# Patient Record
Sex: FEMALE | Race: OTHER | Employment: FULL TIME | URBAN - METROPOLITAN AREA
[De-identification: names, ages, dates, MRNs, and addresses within clinical notes are randomized per-mention and may not be internally consistent; named-entity substitution may affect disease eponyms.]

---

## 2017-06-02 ENCOUNTER — HOSPITAL ENCOUNTER (EMERGENCY)
Age: 32
Discharge: HOME | End: 2017-06-02
Attending: EMERGENCY MEDICINE
Payer: COMMERCIAL

## 2017-06-02 VITALS — BODY MASS INDEX: 38.73 KG/M2 | WEIGHT: 241 LBS | HEIGHT: 66 IN

## 2017-06-02 DIAGNOSIS — J40 BRONCHITIS: Primary | ICD-10-CM

## 2017-06-02 PROCEDURE — 99283 EMERGENCY DEPT VISIT LOW MDM: CPT

## 2017-06-02 PROCEDURE — 94640 AIRWAY INHALATION TREATMENT: CPT

## 2017-06-02 PROCEDURE — 99284 EMERGENCY DEPT VISIT MOD MDM: CPT

## 2017-06-02 RX ORDER — AZITHROMYCIN 500 MG/1
500 TABLET, FILM COATED ORAL DAILY
COMMUNITY
End: 2019-08-29

## 2017-06-02 RX ORDER — ALBUTEROL SULFATE 90 UG/1
2 AEROSOL, METERED RESPIRATORY (INHALATION) EVERY 4 HOURS PRN
Qty: 1 INHALER | Refills: 0 | Status: SHIPPED | OUTPATIENT
Start: 2017-06-02 | End: 2017-07-02

## 2017-06-02 RX ORDER — IPRATROPIUM BROMIDE AND ALBUTEROL SULFATE 2.5; .5 MG/3ML; MG/3ML
3 SOLUTION RESPIRATORY (INHALATION) ONCE
Status: COMPLETED | OUTPATIENT
Start: 2017-06-02 | End: 2017-06-02

## 2017-06-03 NOTE — ED INITIAL ASSESSMENT (HPI)
ken and cough x 2 wks, came from quick care given john chin with bronchitis. Pt states she is feeling worse. Denies fevers.

## 2017-06-03 NOTE — ED PROVIDER NOTES
Patient Seen in: [facility] In [place]    History   Patient presents with:  Cough/URI  Dyspnea MOHAN SOB (respiratory)    Stated Complaint: cough, dx with bronchitis and ear infection at urgent care 2 hours ago.  findiin*    HPI    This light. Conjuctiva clear. Oropharynx is clear and moist.   Lungs: Coarse bilaterally. HEART:  Regular rate and rhythm. S1 and S2. No murmurs, no rubs or gallops. ABDOMEN: Soft, nontender and nondistended. Normoactive bowel sounds. No rebound.  No guardin

## 2019-08-29 PROBLEM — F41.1 GENERALIZED ANXIETY DISORDER: Status: ACTIVE | Noted: 2019-08-29

## 2020-01-29 ENCOUNTER — HOSPITAL ENCOUNTER (EMERGENCY)
Age: 35
Discharge: HOME | End: 2020-01-29
Attending: EMERGENCY MEDICINE
Payer: COMMERCIAL

## 2020-01-29 ENCOUNTER — APPOINTMENT (OUTPATIENT)
Dept: GENERAL RADIOLOGY | Age: 35
End: 2020-01-29
Attending: EMERGENCY MEDICINE
Payer: COMMERCIAL

## 2020-01-29 VITALS
BODY MASS INDEX: 37.93 KG/M2 | HEIGHT: 66 IN | TEMPERATURE: 98 F | RESPIRATION RATE: 18 BRPM | SYSTOLIC BLOOD PRESSURE: 125 MMHG | OXYGEN SATURATION: 99 % | DIASTOLIC BLOOD PRESSURE: 85 MMHG | WEIGHT: 236 LBS | HEART RATE: 92 BPM

## 2020-01-29 DIAGNOSIS — J20.9 ACUTE BRONCHITIS, UNSPECIFIED ORGANISM: Primary | ICD-10-CM

## 2020-01-29 LAB
ADENOVIRUS PCR:: NEGATIVE
B PERT DNA SPEC QL NAA+PROBE: NEGATIVE
C PNEUM DNA SPEC QL NAA+PROBE: NEGATIVE
CORONAVIRUS 229E PCR:: NEGATIVE
CORONAVIRUS HKU1 PCR:: NEGATIVE
CORONAVIRUS NL63 PCR:: NEGATIVE
CORONAVIRUS OC43 PCR:: NEGATIVE
FLUAV RNA SPEC QL NAA+PROBE: NEGATIVE
FLUBV RNA SPEC QL NAA+PROBE: NEGATIVE
METAPNEUMOVIRUS PCR:: NEGATIVE
MYCOPLASMA PNEUMONIA PCR:: NEGATIVE
PARAINFLUENZA 1 PCR:: NEGATIVE
PARAINFLUENZA 2 PCR:: NEGATIVE
PARAINFLUENZA 3 PCR:: NEGATIVE
PARAINFLUENZA 4 PCR:: NEGATIVE
RHINOVIRUS/ENTERO PCR:: NEGATIVE
RSV RNA SPEC QL NAA+PROBE: NEGATIVE

## 2020-01-29 PROCEDURE — 94664 DEMO&/EVAL PT USE INHALER: CPT

## 2020-01-29 PROCEDURE — 87798 DETECT AGENT NOS DNA AMP: CPT | Performed by: EMERGENCY MEDICINE

## 2020-01-29 PROCEDURE — 87999 UNLISTED MICROBIOLOGY PX: CPT

## 2020-01-29 PROCEDURE — 87581 M.PNEUMON DNA AMP PROBE: CPT | Performed by: EMERGENCY MEDICINE

## 2020-01-29 PROCEDURE — 87633 RESP VIRUS 12-25 TARGETS: CPT | Performed by: EMERGENCY MEDICINE

## 2020-01-29 PROCEDURE — 71046 X-RAY EXAM CHEST 2 VIEWS: CPT | Performed by: EMERGENCY MEDICINE

## 2020-01-29 PROCEDURE — 99284 EMERGENCY DEPT VISIT MOD MDM: CPT

## 2020-01-29 PROCEDURE — 99283 EMERGENCY DEPT VISIT LOW MDM: CPT

## 2020-01-29 PROCEDURE — 87486 CHLMYD PNEUM DNA AMP PROBE: CPT | Performed by: EMERGENCY MEDICINE

## 2020-01-29 RX ORDER — ALBUTEROL SULFATE 90 UG/1
2 AEROSOL, METERED RESPIRATORY (INHALATION) EVERY 6 HOURS PRN
Qty: 1 INHALER | Refills: 0 | Status: SHIPPED | OUTPATIENT
Start: 2020-01-29 | End: 2020-02-08 | Stop reason: ALTCHOICE

## 2020-01-29 NOTE — ED PROVIDER NOTES
Patient Seen in: [facility] Department In [place]      History   Patient presents with:  Dyspnea MOHAN SOB    Stated Complaint: cough    HPI    Patient is a 41-year-old female comes emergency room for evaluation of cough.   Patient states she is be lymphadenopathy. LUNG: Lungs clear to auscultation bilaterally, no wheezing, no rales, no rhonchi. CARDIOVASCULAR: Regular rate and rhythm. Normal S1S2. No S3S4 or murmur.   SKIN:  warm and dry  NEURO:  no focal deficits    ED Course     Labs Reviewed return immediately to the ER for worsening, concerning, new, changing or persisting symptoms. I discussed the case with the patient and they had no questions, complaints, or concerns. Patient felt comfortable going home.             Disposition and Plan

## 2020-01-29 NOTE — ED INITIAL ASSESSMENT (HPI)
TO ER WITH 1 MONTH OF COUGH. STATES WENT TO URGENT CARE AND WAS TOLD SHE HAD \"THE COMMON COLD\" AND PLACED ON A Z-PAC. PT STATES WORSENING OF COUGH OVER PAST 2 WEEKS.   ALSO WITH C/O BILAT EAR PAIN

## 2020-01-31 ENCOUNTER — OFFICE VISIT (OUTPATIENT)
Dept: FAMILY MEDICINE CLINIC | Facility: CLINIC | Age: 35
End: 2020-01-31
Payer: COMMERCIAL

## 2020-01-31 VITALS
DIASTOLIC BLOOD PRESSURE: 80 MMHG | HEART RATE: 74 BPM | TEMPERATURE: 98 F | RESPIRATION RATE: 16 BRPM | OXYGEN SATURATION: 99 % | WEIGHT: 245 LBS | HEIGHT: 66 IN | SYSTOLIC BLOOD PRESSURE: 128 MMHG | BODY MASS INDEX: 39.37 KG/M2

## 2020-01-31 DIAGNOSIS — Z00.00 LABORATORY TESTS ORDERED AS PART OF A COMPLETE PHYSICAL EXAM (CPE): ICD-10-CM

## 2020-01-31 DIAGNOSIS — J40 BRONCHITIS: Primary | ICD-10-CM

## 2020-01-31 DIAGNOSIS — R05.3 CHRONIC COUGH: ICD-10-CM

## 2020-01-31 PROCEDURE — 99203 OFFICE O/P NEW LOW 30 MIN: CPT | Performed by: FAMILY MEDICINE

## 2020-01-31 RX ORDER — FLUTICASONE PROPIONATE 50 MCG
2 SPRAY, SUSPENSION (ML) NASAL DAILY
Qty: 3 BOTTLE | Refills: 3 | Status: SHIPPED | OUTPATIENT
Start: 2020-01-31 | End: 2020-02-08 | Stop reason: ALTCHOICE

## 2020-01-31 RX ORDER — METHYLPREDNISOLONE 4 MG/1
TABLET ORAL
Qty: 1 KIT | Refills: 0 | Status: SHIPPED | OUTPATIENT
Start: 2020-01-31 | End: 2020-02-08 | Stop reason: ALTCHOICE

## 2020-01-31 RX ORDER — CODEINE PHOSPHATE AND GUAIFENESIN 10; 100 MG/5ML; MG/5ML
5 SOLUTION ORAL EVERY 6 HOURS PRN
Qty: 180 ML | Refills: 0 | Status: SHIPPED | OUTPATIENT
Start: 2020-01-31 | End: 2020-02-08 | Stop reason: ALTCHOICE

## 2020-01-31 NOTE — PROGRESS NOTES
HPI:    Patient ID: Beatrice Ortiz is a 37year old female. Patient presents with:  Cough: New patient cough and chest congestion- she was told in ER she has bronchitis. She is due for a routine physical with fasting blood work and papsmear.   Tdap is up to hours as needed for cough or congestion. 180 mL 0   • methylPREDNISolone (MEDROL) 4 MG Oral Tablet Therapy Pack As directed. 1 kit 0   • Fluticasone Propionate 50 MCG/ACT Nasal Suspension 2 sprays by Each Nare route daily.  3 Bottle 3   • Albuterol Sulfate complete physical exam (CPE)  F/u for physical   - CBC WITH DIFFERENTIAL WITH PLATELET  - COMP METABOLIC PANEL (14)  - LIPID PANEL  - TSH W REFLEX TO FREE T4  - HEMOGLOBIN A1C  - TSH W REFLEX TO FREE T4  - HEMOGLOBIN A1C    2.  Chronic cough  - guaiFENesin-

## 2020-02-01 DIAGNOSIS — D50.9 MICROCYTIC ANEMIA: Primary | ICD-10-CM

## 2020-02-01 LAB
ABSOLUTE BASOPHILS: 32 CELLS/UL (ref 0–200)
ABSOLUTE EOSINOPHILS: 224 CELLS/UL (ref 15–500)
ABSOLUTE LYMPHOCYTES: 2360 CELLS/UL (ref 850–3900)
ABSOLUTE MONOCYTES: 880 CELLS/UL (ref 200–950)
ABSOLUTE NEUTROPHILS: 4504 CELLS/UL (ref 1500–7800)
ALBUMIN/GLOBULIN RATIO: 1.3 (CALC) (ref 1–2.5)
ALBUMIN: 3.9 G/DL (ref 3.6–5.1)
ALKALINE PHOSPHATASE: 84 U/L (ref 33–115)
ALT: 22 U/L (ref 6–29)
AST: 17 U/L (ref 10–30)
BASOPHILS: 0.4 %
BILIRUBIN, TOTAL: 0.3 MG/DL (ref 0.2–1.2)
BUN: 12 MG/DL (ref 7–25)
CALCIUM: 8.6 MG/DL (ref 8.6–10.2)
CARBON DIOXIDE: 23 MMOL/L (ref 20–32)
CHLORIDE: 104 MMOL/L (ref 98–110)
CHOL/HDLC RATIO: 2.5 (CALC)
CHOLESTEROL, TOTAL: 181 MG/DL
CREATININE: 0.75 MG/DL (ref 0.5–1.1)
EGFR IF AFRICN AM: 119 ML/MIN/1.73M2
EGFR IF NONAFRICN AM: 103 ML/MIN/1.73M2
EOSINOPHILS: 2.8 %
GLOBULIN: 2.9 G/DL (CALC) (ref 1.9–3.7)
GLUCOSE: 89 MG/DL (ref 65–99)
HDL CHOLESTEROL: 72 MG/DL
HEMATOCRIT: 33.2 % (ref 35–45)
HEMOGLOBIN A1C: 5.6 % OF TOTAL HGB
HEMOGLOBIN: 10.8 G/DL (ref 11.7–15.5)
LDL-CHOLESTEROL: 88 MG/DL (CALC)
LYMPHOCYTES: 29.5 %
MCH: 25.2 PG (ref 27–33)
MCHC: 32.5 G/DL (ref 32–36)
MCV: 77.6 FL (ref 80–100)
MONOCYTES: 11 %
MPV: 9.7 FL (ref 7.5–12.5)
NEUTROPHILS: 56.3 %
NON-HDL CHOLESTEROL: 109 MG/DL (CALC)
PLATELET COUNT: 388 THOUSAND/UL (ref 140–400)
POTASSIUM: 3.9 MMOL/L (ref 3.5–5.3)
PROTEIN, TOTAL: 6.8 G/DL (ref 6.1–8.1)
RDW: 15.4 % (ref 11–15)
RED BLOOD CELL COUNT: 4.28 MILLION/UL (ref 3.8–5.1)
SODIUM: 135 MMOL/L (ref 135–146)
TRIGLYCERIDES: 117 MG/DL
TSH W/REFLEX TO FT4: 2.02 MIU/L
WHITE BLOOD CELL COUNT: 8 THOUSAND/UL (ref 3.8–10.8)

## 2020-02-08 ENCOUNTER — OFFICE VISIT (OUTPATIENT)
Dept: FAMILY MEDICINE CLINIC | Facility: CLINIC | Age: 35
End: 2020-02-08
Payer: COMMERCIAL

## 2020-02-08 VITALS
HEIGHT: 66 IN | DIASTOLIC BLOOD PRESSURE: 80 MMHG | HEART RATE: 76 BPM | BODY MASS INDEX: 39.37 KG/M2 | OXYGEN SATURATION: 99 % | RESPIRATION RATE: 16 BRPM | SYSTOLIC BLOOD PRESSURE: 126 MMHG | TEMPERATURE: 98 F | WEIGHT: 245 LBS

## 2020-02-08 DIAGNOSIS — D21.9 FIBROIDS: ICD-10-CM

## 2020-02-08 DIAGNOSIS — Z01.419 WELL WOMAN EXAM WITH ROUTINE GYNECOLOGICAL EXAM: Primary | ICD-10-CM

## 2020-02-08 DIAGNOSIS — D50.0 IRON DEFICIENCY ANEMIA DUE TO CHRONIC BLOOD LOSS: ICD-10-CM

## 2020-02-08 DIAGNOSIS — Z71.82 EXERCISE COUNSELING: ICD-10-CM

## 2020-02-08 DIAGNOSIS — N97.9 INFERTILITY, FEMALE: ICD-10-CM

## 2020-02-08 PROBLEM — F41.1 GENERALIZED ANXIETY DISORDER: Status: RESOLVED | Noted: 2019-08-29 | Resolved: 2020-02-08

## 2020-02-08 PROCEDURE — 99213 OFFICE O/P EST LOW 20 MIN: CPT | Performed by: FAMILY MEDICINE

## 2020-02-08 PROCEDURE — 99395 PREV VISIT EST AGE 18-39: CPT | Performed by: FAMILY MEDICINE

## 2020-02-08 RX ORDER — MELATONIN
325
Qty: 90 TABLET | Refills: 0 | Status: SHIPPED | OUTPATIENT
Start: 2020-02-08 | End: 2020-05-04

## 2020-02-08 RX ORDER — OMEPRAZOLE 40 MG/1
1 CAPSULE, DELAYED RELEASE ORAL DAILY
COMMUNITY
End: 2020-07-24

## 2020-02-08 NOTE — PROGRESS NOTES
HPI:   Nahum Pierre is a 37year old female that presents for well woman exam.     Patient presents with:  Physical: Routine annual physical-she had fasting blood work done on 1/31/2020 . Tdap is up to date. she has a hx of fibroids.    She sees Gyn for i The right ovary measures 2.5 x 2.2 x 1.7 cm and the left ovary 2.5 x 2.0 x 2.1 cm. There is no   cystic or solid adnexal mass.         There is no fluid in the cul-de-sac.         Impression: There are 2 round areas of abnormal echotexture in the uterus. determined that FDA review is not required for   such in-house assays.  (21 .30(e))    CLINICAL INFORMATION         Other Clinical Conditions:     Pelvic Exam, Normal   Intradepartmental Consultation Pathologist:       ICD-CM(s): A; Z12.4 Z11.51   C nervous/anxious. No depression.       PHYSICAL EXAM:   /80   Pulse 76   Temp 98.2 °F (36.8 °C) (Temporal)   Resp 16   Ht 66\"   Wt 245 lb (111.1 kg)   LMP 01/18/2020   SpO2 99%   BMI 39.54 kg/m²  Estimated body mass index is 39.54 kg/m² as calculated 2/2 chronic blood loss from fibroids and heavy menses. Started on iron supplement. F/u with gyn for management of fibroids. - ferrous sulfate 325 (65 FE) MG Oral Tab EC; Take 1 tablet (325 mg total) by mouth daily with breakfast.  Dispense: 90 tablet;  Re

## 2020-02-11 PROBLEM — N97.9 INFERTILITY, FEMALE: Status: ACTIVE | Noted: 2020-02-11

## 2020-02-11 PROBLEM — D50.0 IRON DEFICIENCY ANEMIA DUE TO CHRONIC BLOOD LOSS: Status: ACTIVE | Noted: 2020-02-11

## 2020-02-25 ENCOUNTER — OFFICE VISIT (OUTPATIENT)
Dept: FAMILY MEDICINE CLINIC | Facility: CLINIC | Age: 35
End: 2020-02-25
Payer: COMMERCIAL

## 2020-02-25 VITALS
BODY MASS INDEX: 39.37 KG/M2 | RESPIRATION RATE: 18 BRPM | DIASTOLIC BLOOD PRESSURE: 80 MMHG | WEIGHT: 245 LBS | SYSTOLIC BLOOD PRESSURE: 126 MMHG | HEIGHT: 66 IN | TEMPERATURE: 98 F | HEART RATE: 80 BPM

## 2020-02-25 DIAGNOSIS — T78.40XA ALLERGIC REACTION, INITIAL ENCOUNTER: ICD-10-CM

## 2020-02-25 DIAGNOSIS — R22.0 FACIAL SWELLING: Primary | ICD-10-CM

## 2020-02-25 PROCEDURE — 99213 OFFICE O/P EST LOW 20 MIN: CPT | Performed by: FAMILY MEDICINE

## 2020-02-25 RX ORDER — METHYLPREDNISOLONE 4 MG/1
TABLET ORAL
Qty: 1 KIT | Refills: 0 | Status: SHIPPED | OUTPATIENT
Start: 2020-02-25 | End: 2020-07-24 | Stop reason: ALTCHOICE

## 2020-02-25 NOTE — PROGRESS NOTES
Patient presents with:  Swelling: Facial swelling and ear swelling- she thought possible food allergy since shes allergic to black pepper- she was taken benadryl which has helped.       HPI:    Patient presents with:  Swelling: Facial swelling and ear swell Smokeless tobacco: Never Used    Alcohol use: Yes      Frequency: 2-4 times a month      Drinks per session: 1 or 2      Comment: socially    Drug use: Never      Current Outpatient Medications   Medication Sig Dispense Refill   • methylPREDNISolone (Zhang Ng initial encounter  - methylPREDNISolone (MEDROL) 4 MG Oral Tablet Therapy Pack; As directed. Dispense: 1 kit; Refill: 0      Possible allergic reaction to the hair dye. Started her on steroid pack. Continue on benadryl prn for itch and swelling.    Cli

## 2020-05-04 DIAGNOSIS — D21.9 FIBROIDS: ICD-10-CM

## 2020-05-04 DIAGNOSIS — D50.0 IRON DEFICIENCY ANEMIA DUE TO CHRONIC BLOOD LOSS: ICD-10-CM

## 2020-05-04 RX ORDER — PNV NO.95/FERROUS FUM/FOLIC AC 28MG-0.8MG
TABLET ORAL
Qty: 90 TABLET | Refills: 0 | Status: SHIPPED | OUTPATIENT
Start: 2020-05-04 | End: 2020-10-31 | Stop reason: ALTCHOICE

## 2020-05-04 NOTE — TELEPHONE ENCOUNTER
Requesting IRON 325 (65 Fe) MG  LOV: 2/08/20  RTC: No follow-ups on file  Last Relevant Labs: 2/1/20- lab orders pending  Filled: 2/8/20 #90 with 0 refills    No future appointments.

## 2020-05-05 ENCOUNTER — TELEPHONE (OUTPATIENT)
Dept: FAMILY MEDICINE CLINIC | Facility: CLINIC | Age: 35
End: 2020-05-05

## 2020-05-05 ENCOUNTER — VIRTUAL PHONE E/M (OUTPATIENT)
Dept: FAMILY MEDICINE CLINIC | Facility: CLINIC | Age: 35
End: 2020-05-05
Payer: COMMERCIAL

## 2020-05-05 DIAGNOSIS — R51.9 SINUS HEADACHE: Primary | ICD-10-CM

## 2020-05-05 DIAGNOSIS — Z91.09 ENVIRONMENTAL ALLERGIES: ICD-10-CM

## 2020-05-05 DIAGNOSIS — H65.193 ACUTE MIDDLE EAR EFFUSION, BILATERAL: ICD-10-CM

## 2020-05-05 PROCEDURE — 99213 OFFICE O/P EST LOW 20 MIN: CPT | Performed by: FAMILY MEDICINE

## 2020-05-05 RX ORDER — AZELASTINE 1 MG/ML
1 SPRAY, METERED NASAL 2 TIMES DAILY
Qty: 30 ML | Refills: 0 | Status: SHIPPED | OUTPATIENT
Start: 2020-05-05 | End: 2020-06-04

## 2020-05-05 RX ORDER — FEXOFENADINE HCL AND PSEUDOEPHEDRINE HCI 180; 240 MG/1; MG/1
1 TABLET, EXTENDED RELEASE ORAL DAILY
Qty: 90 TABLET | Refills: 0 | Status: SHIPPED | OUTPATIENT
Start: 2020-05-05 | End: 2020-10-22

## 2020-05-05 RX ORDER — FLUTICASONE PROPIONATE 50 MCG
2 SPRAY, SUSPENSION (ML) NASAL DAILY
Qty: 3 BOTTLE | Refills: 3 | Status: SHIPPED | OUTPATIENT
Start: 2020-05-05 | End: 2020-12-12

## 2020-05-05 NOTE — TELEPHONE ENCOUNTER
Future Appointments   Date Time Provider Anny [place]   5/5/2020 10:30 AM Nicky Blood, DO [facility] [address] [facility] [address]     Patient verbally consents to a virtual/telephone check-in service for the date and time noted above.  Patient understands and accepts

## 2020-05-05 NOTE — PROGRESS NOTES
Virtual Telephone Check-In    Yonny Houston verbally consents to a Virtual/Telephone Check-In visit on 05/05/20. Patient understands and accepts financial responsibility for any deductible, co-insurance and/or co-pays associated with this service.     Dur Environmental allergies  - Fluticasone Propionate 50 MCG/ACT Nasal Suspension; 2 sprays by Each Nare route daily. Dispense: 3 Bottle; Refill: 3  - Azelastine HCl 0.1 % Nasal Solution; 1 spray by Nasal route 2 (two) times daily.   Dispense: 30 mL; Refill: 0

## 2020-05-05 NOTE — TELEPHONE ENCOUNTER
Called in RX for Fexofenadine-Pseudoephed ER to [facility] per Dr. Allison Duckworth approval. Task completed.

## 2020-05-05 NOTE — PATIENT INSTRUCTIONS
Sinus Headache    The sinuses are air-filled spaces in the bones of the face. They connect to the inside of the nose. Sinusitis is an inflammation of the tissue lining the sinus cavities.  Sinus inflammation can occur during a cold or hay fever (allergies · You may use over-the-counter decongestants unless a similar medicine was prescribed. Nasal sprays or drops work the fastest. Use one that contains phenylephrine or oxymetazoline. First blow your nose gently to remove mucus. Then apply the spray or drops. · You may use over-the-counter medicine to control pain and fever, unless another pain medicine was prescribed. Talk with your provider before using acetaminophen or ibuprofen if you have chronic liver or kidney disease.  Also talk with your provider if you

## 2020-05-20 ENCOUNTER — TELEPHONE (OUTPATIENT)
Dept: FAMILY MEDICINE CLINIC | Facility: CLINIC | Age: 35
End: 2020-05-20

## 2020-05-20 ENCOUNTER — VIRTUAL PHONE E/M (OUTPATIENT)
Dept: FAMILY MEDICINE CLINIC | Facility: CLINIC | Age: 35
End: 2020-05-20
Payer: COMMERCIAL

## 2020-05-20 DIAGNOSIS — K21.9 GASTROESOPHAGEAL REFLUX DISEASE WITHOUT ESOPHAGITIS: Primary | ICD-10-CM

## 2020-05-20 PROCEDURE — 99213 OFFICE O/P EST LOW 20 MIN: CPT | Performed by: FAMILY MEDICINE

## 2020-05-20 RX ORDER — OMEPRAZOLE 40 MG/1
40 CAPSULE, DELAYED RELEASE ORAL DAILY
Qty: 30 CAPSULE | Refills: 0 | Status: SHIPPED | OUTPATIENT
Start: 2020-05-20 | End: 2020-06-19

## 2020-05-20 NOTE — TELEPHONE ENCOUNTER
Virtual/Telephone Consent    Springjeff Rees verbally consents to a Virtual/Telephone Check-In service on 5/20/20. Patient understands and accepts financial responsibility for any deductible, co-insurance and/or co-pays associated with this service.

## 2020-05-20 NOTE — PATIENT INSTRUCTIONS
Tips to Control Acid Reflux    To control acid reflux, you’ll need to make some basic diet and lifestyle changes. The simple steps outlined below may be all you’ll need to ease discomfort. Watch what you eat  · Don't have fatty foods or spicy foods.   ·

## 2020-05-20 NOTE — PROGRESS NOTES
VIRTUAL/TELEPHONE ENCOUNTER    Subjective    This visit is conducted using live telephone encounter with patient due to [facility] emergency.      Chief Complaint:  Patient presents with:  Abdominal Pain        The patient confirmed knowledge of the limitations • Anxiety    • Depression    • Fibroids 2/8/2020      No past surgical history on file.    Family History   Problem Relation Age of Onset   • Cancer Mother         uterine   • Cancer Paternal Grandfather         lung   • High Blood Pressure Father    • Hi service. TE done instead of ov due to COVID-19 emergency.      Duration of the service: 13 minutes were spent with the patient     Summary of topics discussed:  BLAISE Mejia,

## 2020-06-08 ENCOUNTER — TELEPHONE (OUTPATIENT)
Dept: FAMILY MEDICINE CLINIC | Facility: CLINIC | Age: 35
End: 2020-06-08

## 2020-06-08 DIAGNOSIS — M54.40 ACUTE LOW BACK PAIN WITH SCIATICA, SCIATICA LATERALITY UNSPECIFIED, UNSPECIFIED BACK PAIN LATERALITY: Primary | ICD-10-CM

## 2020-06-08 NOTE — TELEPHONE ENCOUNTER
Pt would like referral to chiropractor. She may have a pinched nerve. Got out of bed and heard something crack and now cant sit. Patient can walk and lay down.

## 2020-06-29 ENCOUNTER — HOSPITAL ENCOUNTER (EMERGENCY)
Age: 35
Discharge: HOME | End: 2020-06-29
Attending: EMERGENCY MEDICINE
Payer: COMMERCIAL

## 2020-06-29 VITALS
RESPIRATION RATE: 18 BRPM | WEIGHT: 244.69 LBS | BODY MASS INDEX: 40 KG/M2 | TEMPERATURE: 98 F | DIASTOLIC BLOOD PRESSURE: 51 MMHG | SYSTOLIC BLOOD PRESSURE: 103 MMHG | OXYGEN SATURATION: 99 % | HEART RATE: 78 BPM

## 2020-06-29 DIAGNOSIS — R53.1 WEAKNESS GENERALIZED: Primary | ICD-10-CM

## 2020-06-29 LAB
ALBUMIN SERPL-MCNC: 3.3 G/DL (ref 3.4–5)
ALBUMIN/GLOB SERPL: 0.8 {RATIO} (ref 1–2)
ALP LIVER SERPL-CCNC: 96 U/L (ref 37–98)
ALT SERPL-CCNC: 22 U/L (ref 13–56)
ANION GAP SERPL CALC-SCNC: 4 MMOL/L (ref 0–18)
AST SERPL-CCNC: 20 U/L (ref 15–37)
ATRIAL RATE: 81 BPM
BASOPHILS # BLD AUTO: 0.04 X10(3) UL (ref 0–0.2)
BASOPHILS NFR BLD AUTO: 0.5 %
BILIRUB SERPL-MCNC: 0.3 MG/DL (ref 0.1–2)
BILIRUB UR QL STRIP.AUTO: NEGATIVE
BUN BLD-MCNC: 10 MG/DL (ref 7–18)
BUN/CREAT SERPL: 16.1 (ref 10–20)
CALCIUM BLD-MCNC: 8.6 MG/DL (ref 8.5–10.1)
CHLORIDE SERPL-SCNC: 107 MMOL/L (ref 98–112)
CK SERPL-CCNC: 381 U/L (ref 26–192)
CO2 SERPL-SCNC: 27 MMOL/L (ref 21–32)
COLOR UR AUTO: YELLOW
CREAT BLD-MCNC: 0.62 MG/DL (ref 0.55–1.02)
DEPRECATED RDW RBC AUTO: 43 FL (ref 35.1–46.3)
EOSINOPHIL # BLD AUTO: 0.14 X10(3) UL (ref 0–0.7)
EOSINOPHIL NFR BLD AUTO: 1.6 %
ERYTHROCYTE [DISTWIDTH] IN BLOOD BY AUTOMATED COUNT: 14.4 % (ref 11–15)
GLOBULIN PLAS-MCNC: 4.4 G/DL (ref 2.8–4.4)
GLUCOSE BLD-MCNC: 92 MG/DL (ref 70–99)
GLUCOSE UR STRIP.AUTO-MCNC: NEGATIVE MG/DL
HAV IGM SER QL: 2.4 MG/DL (ref 1.6–2.6)
HCT VFR BLD AUTO: 36.5 % (ref 35–48)
HGB BLD-MCNC: 11.2 G/DL (ref 12–16)
IMM GRANULOCYTES # BLD AUTO: 0.02 X10(3) UL (ref 0–1)
IMM GRANULOCYTES NFR BLD: 0.2 %
KETONES UR STRIP.AUTO-MCNC: NEGATIVE MG/DL
LEUKOCYTE ESTERASE UR QL STRIP.AUTO: NEGATIVE
LYMPHOCYTES # BLD AUTO: 2.88 X10(3) UL (ref 1–4)
LYMPHOCYTES NFR BLD AUTO: 33.7 %
M PROTEIN MFR SERPL ELPH: 7.7 G/DL (ref 6.4–8.2)
MCH RBC QN AUTO: 25.4 PG (ref 26–34)
MCHC RBC AUTO-ENTMCNC: 30.7 G/DL (ref 31–37)
MCV RBC AUTO: 82.8 FL (ref 80–100)
MONOCYTES # BLD AUTO: 0.76 X10(3) UL (ref 0.1–1)
MONOCYTES NFR BLD AUTO: 8.9 %
NEUTROPHILS # BLD AUTO: 4.7 X10 (3) UL (ref 1.5–7.7)
NEUTROPHILS # BLD AUTO: 4.7 X10(3) UL (ref 1.5–7.7)
NEUTROPHILS NFR BLD AUTO: 55.1 %
NITRITE UR QL STRIP.AUTO: NEGATIVE
OSMOLALITY SERPL CALC.SUM OF ELEC: 285 MOSM/KG (ref 275–295)
P AXIS: 47 DEGREES
P-R INTERVAL: 146 MS
PH UR STRIP.AUTO: 6 [PH] (ref 4.5–8)
PLATELET # BLD AUTO: 376 10(3)UL (ref 150–450)
POCT LOT NUMBER: NORMAL
POCT URINE PREGNANCY: NEGATIVE
POTASSIUM SERPL-SCNC: 3.9 MMOL/L (ref 3.5–5.1)
PROT UR STRIP.AUTO-MCNC: NEGATIVE MG/DL
Q-T INTERVAL: 356 MS
QRS DURATION: 90 MS
QTC CALCULATION (BEZET): 413 MS
R AXIS: -29 DEGREES
RBC # BLD AUTO: 4.41 X10(6)UL (ref 3.8–5.3)
RBC UR QL AUTO: NEGATIVE
SODIUM SERPL-SCNC: 138 MMOL/L (ref 136–145)
SP GR UR STRIP.AUTO: 1.01 (ref 1–1.03)
T AXIS: -9 DEGREES
TSI SER-ACNC: 2.77 MIU/ML (ref 0.36–3.74)
UROBILINOGEN UR STRIP.AUTO-MCNC: 0.2 MG/DL
VENTRICULAR RATE: 81 BPM
WBC # BLD AUTO: 8.5 X10(3) UL (ref 4–11)

## 2020-06-29 PROCEDURE — 93005 ELECTROCARDIOGRAM TRACING: CPT

## 2020-06-29 PROCEDURE — 96360 HYDRATION IV INFUSION INIT: CPT

## 2020-06-29 PROCEDURE — 81003 URINALYSIS AUTO W/O SCOPE: CPT | Performed by: EMERGENCY MEDICINE

## 2020-06-29 PROCEDURE — 84443 ASSAY THYROID STIM HORMONE: CPT | Performed by: EMERGENCY MEDICINE

## 2020-06-29 PROCEDURE — 99284 EMERGENCY DEPT VISIT MOD MDM: CPT

## 2020-06-29 PROCEDURE — 83735 ASSAY OF MAGNESIUM: CPT | Performed by: EMERGENCY MEDICINE

## 2020-06-29 PROCEDURE — 93010 ELECTROCARDIOGRAM REPORT: CPT

## 2020-06-29 PROCEDURE — 81025 URINE PREGNANCY TEST: CPT

## 2020-06-29 PROCEDURE — 80053 COMPREHEN METABOLIC PANEL: CPT | Performed by: EMERGENCY MEDICINE

## 2020-06-29 PROCEDURE — 82550 ASSAY OF CK (CPK): CPT | Performed by: EMERGENCY MEDICINE

## 2020-06-29 PROCEDURE — 85025 COMPLETE CBC W/AUTO DIFF WBC: CPT | Performed by: EMERGENCY MEDICINE

## 2020-06-29 NOTE — ED PROVIDER NOTES
Patient Seen in: [facility] In [place]      History   Patient presents with:  Fatigue    Stated Complaint: dehydration;out in the sun yesterday    HPI    18-year-old with a history of anxiety, depression and fibroids presents for eval 100%   BMI 39.50 kg/m²         Physical Exam    General: Patient is awake, alert in no acute distress. HEENT:  Pupils are PERRL. Extraocular muscles are intact. Sclera are not icteric. Conjunctivae within normal limits.   Mucous members are mildly dry orders. TSH W REFLEX TO FREE T4   POCT PREGNANCY, URINE     EKG    Rate, intervals and axes as noted on EKG Report. Rate: 81  Rhythm: Sinus Rhythm  Reading: LVH changes with nonspecific T wave changes. Normal intervals.               Fluids ordered

## 2020-06-29 NOTE — ED INITIAL ASSESSMENT (HPI)
States she thinks she's dehydrated and fatigued, dizzy, yesterday arms, legs cramping. No vomiting,diarrhea,fever.   States she was outside in the sun all day yesterday

## 2020-07-24 ENCOUNTER — OFFICE VISIT (OUTPATIENT)
Dept: FAMILY MEDICINE CLINIC | Facility: CLINIC | Age: 35
End: 2020-07-24
Payer: COMMERCIAL

## 2020-07-24 VITALS
HEART RATE: 74 BPM | RESPIRATION RATE: 16 BRPM | DIASTOLIC BLOOD PRESSURE: 74 MMHG | WEIGHT: 258 LBS | BODY MASS INDEX: 41.46 KG/M2 | OXYGEN SATURATION: 98 % | HEIGHT: 66 IN | SYSTOLIC BLOOD PRESSURE: 122 MMHG | TEMPERATURE: 98 F

## 2020-07-24 DIAGNOSIS — E66.01 CLASS 3 SEVERE OBESITY DUE TO EXCESS CALORIES WITHOUT SERIOUS COMORBIDITY WITH BODY MASS INDEX (BMI) OF 40.0 TO 44.9 IN ADULT (HCC): ICD-10-CM

## 2020-07-24 DIAGNOSIS — K21.9 GASTROESOPHAGEAL REFLUX DISEASE WITHOUT ESOPHAGITIS: ICD-10-CM

## 2020-07-24 DIAGNOSIS — M79.89 LEG SWELLING: Primary | ICD-10-CM

## 2020-07-24 DIAGNOSIS — Z86.018 HISTORY OF UTERINE FIBROID: ICD-10-CM

## 2020-07-24 DIAGNOSIS — N97.9 INFERTILITY, FEMALE: ICD-10-CM

## 2020-07-24 PROCEDURE — 3074F SYST BP LT 130 MM HG: CPT | Performed by: FAMILY MEDICINE

## 2020-07-24 PROCEDURE — 3078F DIAST BP <80 MM HG: CPT | Performed by: FAMILY MEDICINE

## 2020-07-24 PROCEDURE — 3008F BODY MASS INDEX DOCD: CPT | Performed by: FAMILY MEDICINE

## 2020-07-24 PROCEDURE — 99214 OFFICE O/P EST MOD 30 MIN: CPT | Performed by: FAMILY MEDICINE

## 2020-07-24 RX ORDER — FUROSEMIDE 20 MG/1
20 TABLET ORAL DAILY
Qty: 5 TABLET | Refills: 0 | Status: SHIPPED | OUTPATIENT
Start: 2020-07-24 | End: 2020-07-29

## 2020-07-24 RX ORDER — OMEPRAZOLE 40 MG/1
40 CAPSULE, DELAYED RELEASE ORAL DAILY
Qty: 90 CAPSULE | Refills: 0 | Status: SHIPPED | OUTPATIENT
Start: 2020-07-24 | End: 2020-10-03

## 2020-07-24 NOTE — PROGRESS NOTES
Patient presents with:  Leg Swelling: Leg swelling- bilateral. She has concerns with fibroid that she would like to discuss removal.      HPI:    Patient is here with swelling of her legs bilaterally.  This has bene ongoing issue for the past few months whe Eyes: Negative for pain and visual disturbance. Respiratory: Negative for cough, chest tightness, shortness of breath and wheezing. Cardiovascular: Negative for chest pain, palpitations and leg swelling.    Gastrointestinal: Negative for nausea, vomi by mouth daily. 90 capsule 0   • Fluticasone Propionate 50 MCG/ACT Nasal Suspension 2 sprays by Each Nare route daily. 3 Bottle 3   • Fexofenadine-Pseudoephed -240 MG Oral Tablet 24 Hr Take 1 tablet by mouth daily.  90 tablet 0   • IRON 325 (65 Fe) MG without esophagitis  Leg swelling  (primary encounter diagnosis)  History of uterine fibroid  Infertility, female  Class 3 severe obesity due to excess calories without serious comorbidity with body mass index (bmi) of 40.0 to 44.9 in adult (hcc)    1.  Gas moderate (500 kCal/d) deficit diet  •   Risks of dieting were reviewed, including fatigue, temporary hair loss, gallstone formation, gout, and with very low calorie diets, electrolyte abnormalities, nutrient inadequacies, and loss of lean body mass.   • Pro

## 2020-07-30 ENCOUNTER — HOSPITAL ENCOUNTER (OUTPATIENT)
Dept: ULTRASOUND IMAGING | Age: 35
Discharge: HOME | End: 2020-07-30
Attending: FAMILY MEDICINE
Payer: COMMERCIAL

## 2020-07-30 DIAGNOSIS — Z86.018 HISTORY OF UTERINE FIBROID: ICD-10-CM

## 2020-07-30 PROCEDURE — 76830 TRANSVAGINAL US NON-OB: CPT | Performed by: FAMILY MEDICINE

## 2020-09-18 ENCOUNTER — TELEPHONE (OUTPATIENT)
Dept: FAMILY MEDICINE CLINIC | Facility: CLINIC | Age: 35
End: 2020-09-18

## 2020-09-18 ENCOUNTER — VIRTUAL PHONE E/M (OUTPATIENT)
Dept: FAMILY MEDICINE CLINIC | Facility: CLINIC | Age: 35
End: 2020-09-18
Payer: COMMERCIAL

## 2020-09-18 DIAGNOSIS — R05.9 COUGH: Primary | ICD-10-CM

## 2020-09-18 DIAGNOSIS — R05.8 COUGH WITH EXPOSURE TO COVID-19 VIRUS: ICD-10-CM

## 2020-09-18 DIAGNOSIS — Z20.822 COUGH WITH EXPOSURE TO COVID-19 VIRUS: ICD-10-CM

## 2020-09-18 PROCEDURE — 99213 OFFICE O/P EST LOW 20 MIN: CPT | Performed by: FAMILY MEDICINE

## 2020-09-18 NOTE — TELEPHONE ENCOUNTER
Future Appointments   Date Time Provider Anny [place]   9/18/2020  2:00 PM Amira Neville DO [facility] [address] [facility] [address]   10/22/2020  2:30 PM Ning Yusuf MD [facility] [place]     Patient verbally consents to a virtual/telephone check-in servic

## 2020-09-18 NOTE — PATIENT INSTRUCTIONS
Coronavirus Disease 2019 (COVID-19): Prevention  The best prevention is to have no contact with the SARS-CoV-2 virus. There is no vaccine yet. Canceling travel and other outings  Stay informed about COVID-19 in your area.  Follow local instructions about · Clean frequently-touched home surfaces often with disinfectant. This includes desk surfaces, printers, phones, kitchen counters, tables, fridge door handle, bathroom surfaces, and any soiled surface. Closely follow disinfectant label instructions.  Go to · If you feel sick in any way, go home and stay home. · Tell your supervisor if you are well but live with someone who has COVID-19. · Stay at least 6 feet away from all people. · Don't shake hands with anyone.   · Don't attend in-person meetings, or cortes If you have been exposed to a person with COVID-19  If you've been exposed within that last 14 days to someone who is suspected of having COVID-19 or has tested positive for it:  · Call your healthcare provider and follow all instructions.  Your activities All 50 states in the [place] have reported cases of COVID-19. Most states report \"community spread\" of COVID-19. This means the source of the illness is not known. COVID-19 is a rapidly-emerging infectious disease.  This means that scientists are actively re In many cases, this virus can cause infection (pneumonia) in both lungs. In some cases, this can cause death. Certain people are at higher risk for complications.  This includes older adults and people with serious chronic health conditions such as heart or Your healthcare provider will ask about your symptoms. He or she will ask where you live, and about your recent travel, and any contact with sick people.  If your healthcare provider thinks you may have COVID-19, he or she will consider whether to test you The most proven treatments right now are those to help your body while it fights the virus. This is known as supportive care. Supportive care may include:   · Getting rest.  This helps your body fight the illness. · Staying hydrated.   Drinking liquids is People who have had COVID-19 and are fully recovered may be asked by their healthcare team to consider donating plasma. This is called COVID-19 convalescent plasma donation.  Plasma from people fully recovered from COVID-19 may contain antibodies to help fi

## 2020-09-18 NOTE — PROGRESS NOTES
VIRTUAL/TELEPHONE ENCOUNTER    Subjective    This visit is conducted using live telephone encounter with patient due to [facility] emergency.      Chief Complaint:  Patient presents with:  Cough        The patient confirmed knowledge of the limitations of the us Exam:  alert and cooperative, Speaking in full sentences comfortably, Normal work of breathing and age appropriate    Assessment:  Patient with Exposure to COVID 19, with acute symptoms requiring testing    Plan:  Stay at home and self isolate for at least

## 2020-09-20 ENCOUNTER — APPOINTMENT (OUTPATIENT)
Dept: GENERAL RADIOLOGY | Age: 35
End: 2020-09-20
Attending: EMERGENCY MEDICINE
Payer: COMMERCIAL

## 2020-09-20 ENCOUNTER — HOSPITAL ENCOUNTER (EMERGENCY)
Age: 35
Discharge: HOME | End: 2020-09-20
Attending: EMERGENCY MEDICINE
Payer: COMMERCIAL

## 2020-09-20 VITALS
DIASTOLIC BLOOD PRESSURE: 63 MMHG | BODY MASS INDEX: 40.98 KG/M2 | OXYGEN SATURATION: 100 % | WEIGHT: 255 LBS | HEART RATE: 74 BPM | TEMPERATURE: 98 F | RESPIRATION RATE: 16 BRPM | SYSTOLIC BLOOD PRESSURE: 117 MMHG | HEIGHT: 66 IN

## 2020-09-20 DIAGNOSIS — B34.9 VIRAL SYNDROME: Primary | ICD-10-CM

## 2020-09-20 LAB
ALBUMIN SERPL-MCNC: 3.4 G/DL (ref 3.4–5)
ALBUMIN/GLOB SERPL: 0.8 {RATIO} (ref 1–2)
ALP LIVER SERPL-CCNC: 96 U/L
ALT SERPL-CCNC: 25 U/L
ANION GAP SERPL CALC-SCNC: 6 MMOL/L (ref 0–18)
AST SERPL-CCNC: 18 U/L (ref 15–37)
BASOPHILS # BLD AUTO: 0.04 X10(3) UL (ref 0–0.2)
BASOPHILS NFR BLD AUTO: 0.4 %
BILIRUB SERPL-MCNC: 0.3 MG/DL (ref 0.1–2)
BUN BLD-MCNC: 10 MG/DL (ref 7–18)
BUN/CREAT SERPL: 14.7 (ref 10–20)
CALCIUM BLD-MCNC: 8.6 MG/DL (ref 8.5–10.1)
CHLORIDE SERPL-SCNC: 106 MMOL/L (ref 98–112)
CO2 SERPL-SCNC: 27 MMOL/L (ref 21–32)
CREAT BLD-MCNC: 0.68 MG/DL
DEPRECATED RDW RBC AUTO: 46.6 FL (ref 35.1–46.3)
EOSINOPHIL # BLD AUTO: 0.13 X10(3) UL (ref 0–0.7)
EOSINOPHIL NFR BLD AUTO: 1.2 %
ERYTHROCYTE [DISTWIDTH] IN BLOOD BY AUTOMATED COUNT: 15.7 % (ref 11–15)
GLOBULIN PLAS-MCNC: 4.1 G/DL (ref 2.8–4.4)
GLUCOSE BLD-MCNC: 98 MG/DL (ref 70–99)
HCT VFR BLD AUTO: 34.7 %
HGB BLD-MCNC: 10.7 G/DL
IMM GRANULOCYTES # BLD AUTO: 0.03 X10(3) UL (ref 0–1)
IMM GRANULOCYTES NFR BLD: 0.3 %
LYMPHOCYTES # BLD AUTO: 2.3 X10(3) UL (ref 1–4)
LYMPHOCYTES NFR BLD AUTO: 20.6 %
M PROTEIN MFR SERPL ELPH: 7.5 G/DL (ref 6.4–8.2)
MCH RBC QN AUTO: 25.3 PG (ref 26–34)
MCHC RBC AUTO-ENTMCNC: 30.8 G/DL (ref 31–37)
MCV RBC AUTO: 82 FL
MONOCYTES # BLD AUTO: 0.89 X10(3) UL (ref 0.1–1)
MONOCYTES NFR BLD AUTO: 8 %
NEUTROPHILS # BLD AUTO: 7.77 X10 (3) UL (ref 1.5–7.7)
NEUTROPHILS # BLD AUTO: 7.77 X10(3) UL (ref 1.5–7.7)
NEUTROPHILS NFR BLD AUTO: 69.5 %
OSMOLALITY SERPL CALC.SUM OF ELEC: 287 MOSM/KG (ref 275–295)
PLATELET # BLD AUTO: 412 10(3)UL (ref 150–450)
POCT LOT NUMBER: NORMAL
POCT URINE PREGNANCY: NEGATIVE
POTASSIUM SERPL-SCNC: 3.6 MMOL/L (ref 3.5–5.1)
RBC # BLD AUTO: 4.23 X10(6)UL
SODIUM SERPL-SCNC: 139 MMOL/L (ref 136–145)
TROPONIN I SERPL-MCNC: <0.045 NG/ML (ref ?–0.04)
WBC # BLD AUTO: 11.2 X10(3) UL (ref 4–11)

## 2020-09-20 PROCEDURE — 93010 ELECTROCARDIOGRAM REPORT: CPT

## 2020-09-20 PROCEDURE — 96361 HYDRATE IV INFUSION ADD-ON: CPT

## 2020-09-20 PROCEDURE — 93005 ELECTROCARDIOGRAM TRACING: CPT

## 2020-09-20 PROCEDURE — 96374 THER/PROPH/DIAG INJ IV PUSH: CPT

## 2020-09-20 PROCEDURE — 81025 URINE PREGNANCY TEST: CPT

## 2020-09-20 PROCEDURE — 99285 EMERGENCY DEPT VISIT HI MDM: CPT

## 2020-09-20 PROCEDURE — 85025 COMPLETE CBC W/AUTO DIFF WBC: CPT | Performed by: EMERGENCY MEDICINE

## 2020-09-20 PROCEDURE — 71045 X-RAY EXAM CHEST 1 VIEW: CPT | Performed by: EMERGENCY MEDICINE

## 2020-09-20 PROCEDURE — 84484 ASSAY OF TROPONIN QUANT: CPT | Performed by: EMERGENCY MEDICINE

## 2020-09-20 PROCEDURE — 80053 COMPREHEN METABOLIC PANEL: CPT | Performed by: EMERGENCY MEDICINE

## 2020-09-20 RX ORDER — DEXAMETHASONE SODIUM PHOSPHATE 4 MG/ML
10 VIAL (ML) INJECTION ONCE
Status: COMPLETED | OUTPATIENT
Start: 2020-09-20 | End: 2020-09-20

## 2020-09-20 RX ORDER — BENZONATATE 100 MG/1
100 CAPSULE ORAL 3 TIMES DAILY PRN
Qty: 30 CAPSULE | Refills: 0 | Status: SHIPPED | OUTPATIENT
Start: 2020-09-20 | End: 2020-10-03 | Stop reason: ALTCHOICE

## 2020-09-20 RX ORDER — KETOROLAC TROMETHAMINE 30 MG/ML
30 INJECTION, SOLUTION INTRAMUSCULAR; INTRAVENOUS ONCE
Status: COMPLETED | OUTPATIENT
Start: 2020-09-20 | End: 2020-09-20

## 2020-09-20 NOTE — ED PROVIDER NOTES
Patient Seen in: [facility] In [place]      History   Patient presents with:  Headache  Cough/URI    Stated Complaint: head hurts, chest tight, ears ringing, cough friday, no fever    HPI  This is 49-year-old woman, denies any past me Exam        Physical Exam  Vitals signs and nursing note reviewed. General: Well-appearing woman in no acute distress  Head: Normocephalic and atraumatic.    HEENT:  Mucous membranes are moist.  Oropharynx clear TMs clear bilaterally no cervical lymphaden probability of PE is less than 2% according to [facility] [place] Criteria:  Patient is 48 yrs old or younger  HR is less than or equal to 100  O2 sat is not less than 95%  No history of DVT or PE  No recent Trauma or Surgery  No Hemoptysis  No Exogenous Estrogen Use  N diagnosis)    Disposition:  Discharge  9/20/2020  5:57 pm    Follow-up:  ARLENE Cannon/ [address]  [phone]    Call in 1 day      [facility] in [facility]

## 2020-09-20 NOTE — ED INITIAL ASSESSMENT (HPI)
Pt c/o chest tightness and \"my nose was shaking\" co headache ringing in her ears. No relief with excederin. It works for a bit and it goes away. C/o bilateral ear drainage. Also c/o diarrhea saw urgent care yesterday for covid test results neg.

## 2020-09-21 ENCOUNTER — TELEPHONE (OUTPATIENT)
Dept: FAMILY MEDICINE CLINIC | Facility: CLINIC | Age: 35
End: 2020-09-21

## 2020-09-21 LAB
ATRIAL RATE: 101 BPM
P AXIS: 51 DEGREES
P-R INTERVAL: 156 MS
Q-T INTERVAL: 338 MS
QRS DURATION: 86 MS
QTC CALCULATION (BEZET): 438 MS
R AXIS: -31 DEGREES
T AXIS: -5 DEGREES
VENTRICULAR RATE: 101 BPM

## 2020-09-21 NOTE — TELEPHONE ENCOUNTER
Let's do a phone visit. I reviewed her chart and she doesn't need another covid test. I might consider starting her on antibiotics and repeating cbc in a week.

## 2020-09-21 NOTE — TELEPHONE ENCOUNTER
Pt was in the ER yesterday. She had a virtual visit on 9/18/2020. Covid test at ER was negative. Pt still feeling sick, no relief of symptoms. Do you want pt to schedule a virtual visit? Should she still get Covid test you ordered completed?

## 2020-09-21 NOTE — TELEPHONE ENCOUNTER
Pt went to ER yesterday. Patient still has headaches, fatigue, and achiness in her body. She states that cough has gone. Pt had rapid COVID test at the [facility] in [facility]. Test came back negative.  Patient would like to have some labs done, this wa

## 2020-09-21 NOTE — TELEPHONE ENCOUNTER
Future Appointments   Date Time Provider [facility] [place]   9/22/2020  2:00 PM Brannon Seth DO [facility] [address] [facility] [address]   10/22/2020  2:30 PM Jeffrey Lang MD [facility] [place]     Patient verbally consents to a virtual/telephone check-in servic

## 2020-09-22 ENCOUNTER — VIRTUAL PHONE E/M (OUTPATIENT)
Dept: FAMILY MEDICINE CLINIC | Facility: CLINIC | Age: 35
End: 2020-09-22
Payer: COMMERCIAL

## 2020-09-22 DIAGNOSIS — R51.9 SINUS HEADACHE: Primary | ICD-10-CM

## 2020-09-22 PROCEDURE — 99213 OFFICE O/P EST LOW 20 MIN: CPT | Performed by: FAMILY MEDICINE

## 2020-09-22 RX ORDER — AZITHROMYCIN 250 MG/1
TABLET, FILM COATED ORAL
Qty: 6 TABLET | Refills: 0 | Status: SHIPPED | OUTPATIENT
Start: 2020-09-22 | End: 2020-10-22

## 2020-09-22 NOTE — PROGRESS NOTES
VIRTUAL/TELEPHONE ENCOUNTER    Subjective    This visit is conducted using live telephone encounter with patient due to [facility] emergency. Chief Complaint:  Patient presents with:   Other: lightheaded, headache, dizzy        The patient confirmed knowledg Negative for hearing loss, congestion, sore throat, neck pain and dental problem. Eyes: Negative for pain and visual disturbance. Respiratory: Negative for cough, chest tightness, shortness of breath and wheezing.     Cardiovascular: Negative for chest call 911 for worsening symptoms or acute distress.            Virtual/Telephone Check-In    Patient  verbally consents to a Virtual/Telephone Check-In service on 9/22/20  Patient understands and accepts financial responsibility for any deductible, co-insura

## 2020-10-03 ENCOUNTER — OFFICE VISIT (OUTPATIENT)
Dept: FAMILY MEDICINE CLINIC | Facility: CLINIC | Age: 35
End: 2020-10-03
Payer: COMMERCIAL

## 2020-10-03 VITALS
RESPIRATION RATE: 20 BRPM | WEIGHT: 258 LBS | HEIGHT: 66 IN | HEART RATE: 86 BPM | SYSTOLIC BLOOD PRESSURE: 122 MMHG | TEMPERATURE: 98 F | DIASTOLIC BLOOD PRESSURE: 64 MMHG | BODY MASS INDEX: 41.46 KG/M2 | OXYGEN SATURATION: 98 %

## 2020-10-03 DIAGNOSIS — G43.811 OTHER MIGRAINE WITH STATUS MIGRAINOSUS, INTRACTABLE: ICD-10-CM

## 2020-10-03 DIAGNOSIS — D50.0 IRON DEFICIENCY ANEMIA DUE TO CHRONIC BLOOD LOSS: Primary | ICD-10-CM

## 2020-10-03 DIAGNOSIS — R35.1 EXCESSIVE URINATION AT NIGHT: ICD-10-CM

## 2020-10-03 DIAGNOSIS — R63.1 EXCESSIVE THIRST: ICD-10-CM

## 2020-10-03 DIAGNOSIS — K21.9 GASTROESOPHAGEAL REFLUX DISEASE WITHOUT ESOPHAGITIS: ICD-10-CM

## 2020-10-03 DIAGNOSIS — Z88.8 ALLERGY TO IODINE: ICD-10-CM

## 2020-10-03 DIAGNOSIS — R53.83 FATIGUE, UNSPECIFIED TYPE: ICD-10-CM

## 2020-10-03 DIAGNOSIS — D21.9 FIBROIDS: ICD-10-CM

## 2020-10-03 DIAGNOSIS — L91.8 MULTIPLE ACQUIRED SKIN TAGS: ICD-10-CM

## 2020-10-03 DIAGNOSIS — E66.01 CLASS 3 SEVERE OBESITY DUE TO EXCESS CALORIES WITHOUT SERIOUS COMORBIDITY WITH BODY MASS INDEX (BMI) OF 40.0 TO 44.9 IN ADULT (HCC): ICD-10-CM

## 2020-10-03 DIAGNOSIS — D72.825 BANDEMIA: ICD-10-CM

## 2020-10-03 DIAGNOSIS — Z23 FLU VACCINE NEED: ICD-10-CM

## 2020-10-03 PROCEDURE — 90686 IIV4 VACC NO PRSV 0.5 ML IM: CPT | Performed by: FAMILY MEDICINE

## 2020-10-03 PROCEDURE — 3074F SYST BP LT 130 MM HG: CPT | Performed by: FAMILY MEDICINE

## 2020-10-03 PROCEDURE — 3078F DIAST BP <80 MM HG: CPT | Performed by: FAMILY MEDICINE

## 2020-10-03 PROCEDURE — 90471 IMMUNIZATION ADMIN: CPT | Performed by: FAMILY MEDICINE

## 2020-10-03 PROCEDURE — 99215 OFFICE O/P EST HI 40 MIN: CPT | Performed by: FAMILY MEDICINE

## 2020-10-03 PROCEDURE — 3008F BODY MASS INDEX DOCD: CPT | Performed by: FAMILY MEDICINE

## 2020-10-03 RX ORDER — OMEPRAZOLE 40 MG/1
40 CAPSULE, DELAYED RELEASE ORAL DAILY
Qty: 90 CAPSULE | Refills: 0 | Status: SHIPPED | OUTPATIENT
Start: 2020-10-03 | End: 2021-01-01

## 2020-10-03 RX ORDER — PREDNISONE 50 MG/1
TABLET ORAL
Qty: 3 TABLET | Refills: 0 | Status: SHIPPED | OUTPATIENT
Start: 2020-10-03 | End: 2020-10-22

## 2020-10-03 NOTE — PROGRESS NOTES
Patient presents with:  Immunization/Injection: flu shot  Headache: still having headaches since 9/22/2020 OV, is constantly there. Fatigue  Derm Problem: skin tags on neck. HPI:  Patient is presenting today for follow-up.   She has been having 2-week No distress. HENT: Negative for hearing loss, congestion, sore throat, neck pain and dental problem. Eyes: Negative for pain and visual disturbance. Respiratory: Negative for cough, chest tightness, shortness of breath and wheezing.     Cardiovascular Oral Capsule Delayed Release Take 1 capsule (40 mg total) by mouth daily. 90 capsule 0   • predniSONE 50 MG Oral Tab Take one tablet (50mg) by mouth 13 hours, 7 hours, and 1 hour prior to examination as directed.  3 tablet 0   • diphenhydrAMINE HCl 50 MG Or respiratory distress. No wheezes, rhonchi or rales  Abdominal: Soft. Bowel sounds are normal. Non tender, no masses, no organomegaly or hernias. Musculoskeletal: Normal range of motion. No edema and no tenderness. No effusions.   Lymphadenopathy: No cervi 6. Other migraine with status migrainosus, intractable  Continuous migraine without neurological deficits since 2 weeks. Worsening fatigue, blurry vision (grossly normal) in both eyes, difficulty focusing.  Will get CT scan, iodine allergy - premedicati Urinalysis dipstick      Urine Culture      Urine Culture, Routine      Meds & Refills for this Visit:  Requested Prescriptions     Signed Prescriptions Disp Refills   • Omeprazole 40 MG Oral Capsule Delayed Release 90 capsule 0     Sig: Take 1 capsule (40

## 2020-10-06 DIAGNOSIS — D50.0 IRON DEFICIENCY ANEMIA DUE TO CHRONIC BLOOD LOSS: Primary | ICD-10-CM

## 2020-10-06 RX ORDER — CEPHALEXIN 500 MG/1
500 CAPSULE ORAL 3 TIMES DAILY
Qty: 21 CAPSULE | Refills: 0 | Status: SHIPPED | OUTPATIENT
Start: 2020-10-06 | End: 2020-10-22

## 2020-10-06 RX ORDER — ERGOCALCIFEROL 1.25 MG/1
50000 CAPSULE ORAL WEEKLY
Qty: 12 CAPSULE | Refills: 0 | Status: SHIPPED | OUTPATIENT
Start: 2020-10-06 | End: 2020-12-12 | Stop reason: ALTCHOICE

## 2020-10-06 NOTE — IMAGING NOTE
10/06 Reviewed Prednisone and Diphenhydramine doses and times. She states she is working on finding a . AWILDA.

## 2020-10-07 ENCOUNTER — TELEPHONE (OUTPATIENT)
Dept: FAMILY MEDICINE CLINIC | Facility: CLINIC | Age: 35
End: 2020-10-07

## 2020-10-08 ENCOUNTER — TELEPHONE (OUTPATIENT)
Dept: FAMILY MEDICINE CLINIC | Facility: CLINIC | Age: 35
End: 2020-10-08

## 2020-10-08 ENCOUNTER — HOSPITAL ENCOUNTER (OUTPATIENT)
Dept: CT IMAGING | Age: 35
Discharge: HOME | End: 2020-10-08
Attending: FAMILY MEDICINE
Payer: COMMERCIAL

## 2020-10-08 DIAGNOSIS — G43.811 OTHER MIGRAINE WITH STATUS MIGRAINOSUS, INTRACTABLE: ICD-10-CM

## 2020-10-08 NOTE — TELEPHONE ENCOUNTER
Radiology joce called as she had pt in the exam room but pt had not properly premedicated prior to her exam.  Tech asking if MD wanted CT done with no contrast or for pt to reschedule and medicate properly.   Put tech on hold to get in touch with Dr. Durga De Dios

## 2020-10-09 ENCOUNTER — TELEPHONE (OUTPATIENT)
Dept: FAMILY MEDICINE CLINIC | Facility: CLINIC | Age: 35
End: 2020-10-09

## 2020-10-09 DIAGNOSIS — G43.811 OTHER MIGRAINE WITH STATUS MIGRAINOSUS, INTRACTABLE: Primary | ICD-10-CM

## 2020-10-09 NOTE — TELEPHONE ENCOUNTER
Patient states she was scheduled for a CT brain, the test was cancelled while she was on the table because the patient states the wrong test was ordered.  She states her insurance approved an MRI, she is allergic to blue dye and needed to medicate and take

## 2020-10-09 NOTE — TELEPHONE ENCOUNTER
LM and explained that Dr Aaron Gutiérrez did order a CT, she wrote for a MRI but will need to be approved by her insurance.

## 2020-10-13 ENCOUNTER — TELEPHONE (OUTPATIENT)
Dept: FAMILY MEDICINE CLINIC | Facility: CLINIC | Age: 35
End: 2020-10-13

## 2020-10-13 NOTE — TELEPHONE ENCOUNTER
Pt calling in regards to testing that was ordered-see previous messages from 10-8-2020 and 10-9-2020. Patient states that she is still having headaches. Patient would also like to know if she will be able to get another dose of medication prior to testing.

## 2020-10-13 NOTE — TELEPHONE ENCOUNTER
I spoke to patient and she said their was so much confusion the other day when she went for her CT. She is still having daily headaches that are not relieved by medication. MRI is still pending by insurance.  Will look throughout the day for approval.

## 2020-10-21 NOTE — PROGRESS NOTES
[facility] Note    Diagnosis: ZOHRA likely 2/2 Uterine fibroids     Treatment History: n/a    Visit Diagnosis:  Iron deficiency anemia due to chronic blood loss  (primary encounter diagnosis)    History of Present Illness: 34F with Fexofenadine-Pseudoephed -240 MG Oral Tablet 24 Hr, Take 1 tablet by mouth daily. , Disp: 90 tablet, Rfl: 0    •  IRON 325 (65 Fe) MG Oral Tab, Take 1 tablet by mouth daily with breakfast. (Patient taking differently: 3 (three) times daily.  ), Disp: intact      Results:  Lab Results   Component Value Date    WBC 9.1 10/03/2020    HGB 10.5 (L) 10/03/2020    HCT 33.3 (L) 10/03/2020    MCV 79.7 (L) 10/03/2020     10/03/2020    EOSABS 91 10/03/2020     Lab Results   Component Value Date     0

## 2020-10-22 ENCOUNTER — OFFICE VISIT (OUTPATIENT)
Dept: OBGYN CLINIC | Facility: CLINIC | Age: 35
End: 2020-10-22
Payer: COMMERCIAL

## 2020-10-22 ENCOUNTER — OFFICE VISIT (OUTPATIENT)
Dept: HEMATOLOGY/ONCOLOGY | Facility: HOSPITAL | Age: 35
End: 2020-10-22
Attending: INTERNAL MEDICINE
Payer: COMMERCIAL

## 2020-10-22 VITALS
RESPIRATION RATE: 18 BRPM | SYSTOLIC BLOOD PRESSURE: 164 MMHG | HEART RATE: 88 BPM | HEIGHT: 65.87 IN | TEMPERATURE: 98 F | BODY MASS INDEX: 41.99 KG/M2 | OXYGEN SATURATION: 100 % | DIASTOLIC BLOOD PRESSURE: 84 MMHG | WEIGHT: 258.19 LBS

## 2020-10-22 VITALS
WEIGHT: 257 LBS | SYSTOLIC BLOOD PRESSURE: 120 MMHG | BODY MASS INDEX: 41.8 KG/M2 | DIASTOLIC BLOOD PRESSURE: 74 MMHG | HEIGHT: 65.87 IN

## 2020-10-22 DIAGNOSIS — Z11.3 SCREENING EXAMINATION FOR STD (SEXUALLY TRANSMITTED DISEASE): ICD-10-CM

## 2020-10-22 DIAGNOSIS — Z31.69 ENCOUNTER FOR PRECONCEPTION CONSULTATION: Primary | ICD-10-CM

## 2020-10-22 DIAGNOSIS — Z12.4 ENCOUNTER FOR SCREENING FOR CERVICAL CANCER: ICD-10-CM

## 2020-10-22 DIAGNOSIS — D50.0 IRON DEFICIENCY ANEMIA DUE TO CHRONIC BLOOD LOSS: Primary | ICD-10-CM

## 2020-10-22 LAB — CYTOLOGY CVX/VAG DOC THIN PREP: NORMAL

## 2020-10-22 PROCEDURE — 99205 OFFICE O/P NEW HI 60 MIN: CPT | Performed by: INTERNAL MEDICINE

## 2020-10-22 PROCEDURE — 3077F SYST BP >= 140 MM HG: CPT | Performed by: INTERNAL MEDICINE

## 2020-10-22 PROCEDURE — 3008F BODY MASS INDEX DOCD: CPT | Performed by: OBSTETRICS & GYNECOLOGY

## 2020-10-22 PROCEDURE — 3074F SYST BP LT 130 MM HG: CPT | Performed by: OBSTETRICS & GYNECOLOGY

## 2020-10-22 PROCEDURE — 3078F DIAST BP <80 MM HG: CPT | Performed by: OBSTETRICS & GYNECOLOGY

## 2020-10-22 PROCEDURE — 3079F DIAST BP 80-89 MM HG: CPT | Performed by: INTERNAL MEDICINE

## 2020-10-22 PROCEDURE — 3008F BODY MASS INDEX DOCD: CPT | Performed by: INTERNAL MEDICINE

## 2020-10-22 PROCEDURE — 99204 OFFICE O/P NEW MOD 45 MIN: CPT | Performed by: OBSTETRICS & GYNECOLOGY

## 2020-10-22 RX ORDER — FAMOTIDINE 10 MG/ML
10 INJECTION, SOLUTION INTRAVENOUS ONCE
Status: CANCELLED
Start: 2020-10-23 | End: 2020-10-23

## 2020-10-22 RX ORDER — METHYLPREDNISOLONE SODIUM SUCCINATE 125 MG/2ML
125 INJECTION, POWDER, LYOPHILIZED, FOR SOLUTION INTRAMUSCULAR; INTRAVENOUS ONCE
Status: CANCELLED
Start: 2020-10-23 | End: 2020-10-23

## 2020-10-22 NOTE — PROGRESS NOTES
MD consult for ZOHRA. Reports ongoing for the last 10 years. She has been taking oral iron on/off during that time and currently takes supplementation TID. Heavy menses/uterine fibroids. Colonoscopy and EGD completed 1-2 years ago at outside facility.      Julian Castillo

## 2020-10-22 NOTE — PROGRESS NOTES
[facility]  Obstetrics and Gynecology Referral  History & Physical    CC: Patient is a new patient and referred for preconception counseling    Subjective:     HPI: Krista Coreas is a 37year old  female referred for preconception counselin Delayed Release, Take 1 capsule (40 mg total) by mouth daily. , Disp: 90 capsule, Rfl: 0    •  Fluticasone Propionate 50 MCG/ACT Nasal Suspension, 2 sprays by Each Nare route daily. , Disp: 3 Bottle, Rfl: 3    •  IRON 325 (65 Fe) MG Oral Tab, Take 1 tablet b meetings of clubs or organizations: Not on file        Relationship status: Not on file      Intimate partner violence        Fear of current or ex partner: Not on file        Emotionally abused: Not on file        Physically abused: Not on file        For adenopathy  Abdominal exam: soft, nontender, nondistended  Pelvic exam:   VULVA: normal appearing vulva with no masses, tenderness or lesions  URETHRA: normal appearing  PERINEUM: no lesions  VAGINA: normal appearing vagina with normal color and discharge, was provided a physical exam including breast and pelvic  - Screening for gonorrhea and chlamydia provided  - Patient informed to follow-up with YASMINE  Cervical cancer screening  - repeat pap smear today       All of the findings and plan were discussed with

## 2020-10-22 NOTE — PATIENT INSTRUCTIONS
Please return in one year for your annual gyne visit or sooner if having abnormal vaginal bleeding or severe pelvic pain    Reproductive Endocrinology and Infertility Specialist (YASMINE):    1) Dr. Gardner Daughters, IVF1   [address]   [place], [address] [facility]

## 2020-10-22 NOTE — PATIENT INSTRUCTIONS
[facility]  Address: [address] [place] [address], [place], [address]  Phone: [phone]

## 2020-10-24 ENCOUNTER — HOSPITAL ENCOUNTER (OUTPATIENT)
Dept: MRI IMAGING | Age: 35
Discharge: HOME | End: 2020-10-24
Attending: FAMILY MEDICINE
Payer: COMMERCIAL

## 2020-10-24 DIAGNOSIS — G43.811 OTHER MIGRAINE WITH STATUS MIGRAINOSUS, INTRACTABLE: ICD-10-CM

## 2020-10-24 PROCEDURE — 70551 MRI BRAIN STEM W/O DYE: CPT | Performed by: FAMILY MEDICINE

## 2020-10-26 DIAGNOSIS — G43.811 OTHER MIGRAINE WITH STATUS MIGRAINOSUS, INTRACTABLE: ICD-10-CM

## 2020-10-26 DIAGNOSIS — R93.0 ABNORMAL MRI OF HEAD: Primary | ICD-10-CM

## 2020-10-26 PROBLEM — R90.89 ABNORMAL BRAIN MRI: Status: ACTIVE | Noted: 2020-10-26

## 2020-10-27 ENCOUNTER — OFFICE VISIT (OUTPATIENT)
Dept: HEMATOLOGY/ONCOLOGY | Facility: HOSPITAL | Age: 35
End: 2020-10-27
Attending: INTERNAL MEDICINE
Payer: COMMERCIAL

## 2020-10-27 VITALS
OXYGEN SATURATION: 99 % | SYSTOLIC BLOOD PRESSURE: 138 MMHG | RESPIRATION RATE: 18 BRPM | TEMPERATURE: 98 F | DIASTOLIC BLOOD PRESSURE: 91 MMHG | HEART RATE: 86 BPM

## 2020-10-27 DIAGNOSIS — D50.0 IRON DEFICIENCY ANEMIA DUE TO CHRONIC BLOOD LOSS: Primary | ICD-10-CM

## 2020-10-27 PROCEDURE — 96374 THER/PROPH/DIAG INJ IV PUSH: CPT

## 2020-10-27 PROCEDURE — S0028 INJECTION, FAMOTIDINE, 20 MG: HCPCS | Performed by: INTERNAL MEDICINE

## 2020-10-27 PROCEDURE — 96375 TX/PRO/DX INJ NEW DRUG ADDON: CPT

## 2020-10-27 RX ORDER — FAMOTIDINE 10 MG/ML
10 INJECTION, SOLUTION INTRAVENOUS ONCE
Status: COMPLETED | OUTPATIENT
Start: 2020-10-27 | End: 2020-10-27

## 2020-10-27 RX ORDER — FAMOTIDINE 10 MG/ML
10 INJECTION, SOLUTION INTRAVENOUS ONCE
Status: CANCELLED
Start: 2020-11-03 | End: 2020-11-03

## 2020-10-27 RX ORDER — METHYLPREDNISOLONE SODIUM SUCCINATE 125 MG/2ML
125 INJECTION, POWDER, LYOPHILIZED, FOR SOLUTION INTRAMUSCULAR; INTRAVENOUS ONCE
Status: COMPLETED | OUTPATIENT
Start: 2020-10-27 | End: 2020-10-27

## 2020-10-27 RX ORDER — METHYLPREDNISOLONE SODIUM SUCCINATE 125 MG/2ML
125 INJECTION, POWDER, LYOPHILIZED, FOR SOLUTION INTRAMUSCULAR; INTRAVENOUS ONCE
Status: CANCELLED
Start: 2020-11-03 | End: 2020-11-03

## 2020-10-27 RX ADMIN — METHYLPREDNISOLONE SODIUM SUCCINATE 125 MG: 125 INJECTION, POWDER, LYOPHILIZED, FOR SOLUTION INTRAMUSCULAR; INTRAVENOUS at 15:29:00

## 2020-10-27 RX ADMIN — FAMOTIDINE 10 MG: 10 INJECTION, SOLUTION INTRAVENOUS at 15:30:00

## 2020-10-27 NOTE — PROGRESS NOTES
Education Record    Learner:  Patient    Disease / Diagnosis: iron-deficiency anemia    Barriers / Limitations:  None   Comments:    Method:  Brief focused and Discussion   Comments:    General Topics:  Medication, Precautions, Side effects and symptom man

## 2020-10-31 ENCOUNTER — OFFICE VISIT (OUTPATIENT)
Dept: FAMILY MEDICINE CLINIC | Facility: CLINIC | Age: 35
End: 2020-10-31

## 2020-10-31 VITALS
BODY MASS INDEX: 41.46 KG/M2 | DIASTOLIC BLOOD PRESSURE: 70 MMHG | HEIGHT: 66 IN | RESPIRATION RATE: 16 BRPM | TEMPERATURE: 98 F | OXYGEN SATURATION: 97 % | WEIGHT: 258 LBS | SYSTOLIC BLOOD PRESSURE: 120 MMHG | HEART RATE: 80 BPM

## 2020-10-31 DIAGNOSIS — G93.5 CHIARI MALFORMATION TYPE I (HCC): ICD-10-CM

## 2020-10-31 DIAGNOSIS — R90.89 ABNORMAL BRAIN MRI: Primary | ICD-10-CM

## 2020-10-31 PROCEDURE — 3078F DIAST BP <80 MM HG: CPT | Performed by: FAMILY MEDICINE

## 2020-10-31 PROCEDURE — 99214 OFFICE O/P EST MOD 30 MIN: CPT | Performed by: FAMILY MEDICINE

## 2020-10-31 PROCEDURE — 3074F SYST BP LT 130 MM HG: CPT | Performed by: FAMILY MEDICINE

## 2020-10-31 PROCEDURE — 3008F BODY MASS INDEX DOCD: CPT | Performed by: FAMILY MEDICINE

## 2020-10-31 RX ORDER — TRAMADOL HYDROCHLORIDE 50 MG/1
50 TABLET ORAL EVERY 6 HOURS PRN
Qty: 30 TABLET | Refills: 1 | Status: SHIPPED | OUTPATIENT
Start: 2020-10-31 | End: 2020-12-12

## 2020-10-31 NOTE — PROGRESS NOTES
Patient presents with:  Mood Disturbance  Headache  Test Results: MRI results      HPI:  Patient is presenting today for follow-up. She has been having 2-week history of worsening migraines that are occurring posteriorly around her occipital area.   The mi HENT: Negative for hearing loss, congestion, sore throat, neck pain and dental problem. Eyes: Negative for pain. + visual disturbance. Respiratory: Negative for cough, chest tightness, shortness of breath and wheezing.     Cardiovascular: Negative for Medication Sig Dispense Refill   • traMADol HCl 50 MG Oral Tab Take 1 tablet (50 mg total) by mouth every 6 (six) hours as needed for Pain. 30 tablet 1   • Fexofenadine-Pseudoephedrine (ALLEGRA-D OR) Take 1 tablet by mouth daily as needed.        • ergocalc Cardiovascular: Normal rate, regular rhythm and intact distal pulses. No murmur, rubs or gallops. Pulmonary/Chest: Effort normal and breath sounds normal. No respiratory distress. No wheezes, rhonchi or rales  Abdominal: Soft.  Bowel sounds are normal. N Chiari Malformation Type I  What is a Chiari malformation?   A Chiari malformation is a problem in which a part of the brain (the cerebellum) at the back of the skull bulges through a normal opening in the skull where it joins the spinal canal. This puts pr · Periods of not breathing during sleep (sleep apnea)  · Weakness or abnormal movements  · Trouble with balance  · Abnormal reflexes  · Abnormal shape of the spine (scoliosis)  You may also have a pocket of spinal fluid in the spinal cord or brain stem.  Floreen Babinski · With few or no symptoms, but a syrinx. Your healthcare provider may suggest close monitoring of the defect with a special type of MRI called cine phase contrast. This test looks at the flow of spinal fluid.  It also looks at areas where the fluid is block

## 2020-11-03 ENCOUNTER — OFFICE VISIT (OUTPATIENT)
Dept: SURGERY | Facility: CLINIC | Age: 35
End: 2020-11-03
Payer: COMMERCIAL

## 2020-11-03 VITALS — DIASTOLIC BLOOD PRESSURE: 84 MMHG | SYSTOLIC BLOOD PRESSURE: 124 MMHG | HEART RATE: 96 BPM

## 2020-11-03 DIAGNOSIS — G93.5 CHIARI MALFORMATION TYPE I (HCC): Primary | ICD-10-CM

## 2020-11-03 PROCEDURE — 3074F SYST BP LT 130 MM HG: CPT | Performed by: NEUROLOGICAL SURGERY

## 2020-11-03 PROCEDURE — 99202 OFFICE O/P NEW SF 15 MIN: CPT | Performed by: NEUROLOGICAL SURGERY

## 2020-11-03 PROCEDURE — 3079F DIAST BP 80-89 MM HG: CPT | Performed by: NEUROLOGICAL SURGERY

## 2020-11-03 NOTE — PATIENT INSTRUCTIONS
Chiari Malformation Type I  What is a Chiari malformation?   A Chiari malformation is a problem in which a part of the brain (the cerebellum) at the back of the skull bulges through a normal opening in the skull where it joins the spinal canal. This puts · Periods of not breathing during sleep (sleep apnea)  · Weakness or abnormal movements  · Trouble with balance  · Abnormal reflexes  · Abnormal shape of the spine (scoliosis)  You may also have a pocket of spinal fluid in the spinal cord or brain stem.  Nita Recio · With few or no symptoms, but a syrinx. Your healthcare provider may suggest close monitoring of the defect with a special type of MRI called cine phase contrast. This test looks at the flow of spinal fluid.  It also looks at areas where the fluid is block

## 2020-11-03 NOTE — H&P
[facility]  Neurosurgery Consult    REASON FOR CONSULTATION:  headache    HISTORY OF PRESENT ILLNESS:  Svetlana Christian is a(n) 37year old RH female who has new onset headache starting 2 weeks ago when she had a viral illness.  She began no HIVES    MEDICATIONS:  (Not in a hospital admission)    No current facility-administered medications for this visit. REVIEW OF SYSTEMS:  A 10-point system was reviewed. Pertinent positives and negatives are noted in HPI.       PHYSICAL EXAMINATION:

## 2020-11-03 NOTE — PROGRESS NOTES
Patient referred by Dr. Olayinka Rendon for abnormal MRI brain. Patient c/o constant headaches for 1 month with dizziness.

## 2020-11-06 ENCOUNTER — OFFICE VISIT (OUTPATIENT)
Dept: HEMATOLOGY/ONCOLOGY | Facility: HOSPITAL | Age: 35
End: 2020-11-06
Attending: INTERNAL MEDICINE
Payer: COMMERCIAL

## 2020-11-06 VITALS
SYSTOLIC BLOOD PRESSURE: 120 MMHG | RESPIRATION RATE: 18 BRPM | TEMPERATURE: 98 F | HEART RATE: 92 BPM | OXYGEN SATURATION: 98 % | DIASTOLIC BLOOD PRESSURE: 82 MMHG

## 2020-11-06 DIAGNOSIS — D50.0 IRON DEFICIENCY ANEMIA DUE TO CHRONIC BLOOD LOSS: Primary | ICD-10-CM

## 2020-11-06 PROCEDURE — 96375 TX/PRO/DX INJ NEW DRUG ADDON: CPT

## 2020-11-06 PROCEDURE — S0028 INJECTION, FAMOTIDINE, 20 MG: HCPCS | Performed by: INTERNAL MEDICINE

## 2020-11-06 PROCEDURE — 96374 THER/PROPH/DIAG INJ IV PUSH: CPT

## 2020-11-06 RX ORDER — METHYLPREDNISOLONE SODIUM SUCCINATE 125 MG/2ML
125 INJECTION, POWDER, LYOPHILIZED, FOR SOLUTION INTRAMUSCULAR; INTRAVENOUS ONCE
Status: CANCELLED
Start: 2020-11-06 | End: 2020-11-06

## 2020-11-06 RX ORDER — FAMOTIDINE 10 MG/ML
10 INJECTION, SOLUTION INTRAVENOUS ONCE
Status: CANCELLED
Start: 2020-11-06 | End: 2020-11-06

## 2020-11-06 RX ORDER — METHYLPREDNISOLONE SODIUM SUCCINATE 125 MG/2ML
125 INJECTION, POWDER, LYOPHILIZED, FOR SOLUTION INTRAMUSCULAR; INTRAVENOUS ONCE
Status: COMPLETED | OUTPATIENT
Start: 2020-11-06 | End: 2020-11-06

## 2020-11-06 RX ORDER — FAMOTIDINE 10 MG/ML
10 INJECTION, SOLUTION INTRAVENOUS ONCE
Status: COMPLETED | OUTPATIENT
Start: 2020-11-06 | End: 2020-11-06

## 2020-11-06 RX ADMIN — METHYLPREDNISOLONE SODIUM SUCCINATE 125 MG: 125 INJECTION, POWDER, LYOPHILIZED, FOR SOLUTION INTRAMUSCULAR; INTRAVENOUS at 14:20:00

## 2020-11-06 RX ADMIN — FAMOTIDINE 10 MG: 10 INJECTION, SOLUTION INTRAVENOUS at 14:22:00

## 2020-11-06 NOTE — PROGRESS NOTES
Pt tolerated Feraheme infusion well today without incident or complaint.      Education Record    Learner:  Patient    Disease / Diagnosis: ZOHRA    Barriers / Limitations:  None   Comments:    Method:  Discussion   Comments:    General Topics:  Plan of care

## 2020-11-10 ENCOUNTER — HOSPITAL ENCOUNTER (OUTPATIENT)
Dept: MRI IMAGING | Facility: HOSPITAL | Age: 35
Discharge: HOME | End: 2020-11-10
Attending: NEUROLOGICAL SURGERY
Payer: COMMERCIAL

## 2020-11-10 ENCOUNTER — APPOINTMENT (OUTPATIENT)
Dept: MRI IMAGING | Facility: HOSPITAL | Age: 35
End: 2020-11-10
Attending: NEUROLOGICAL SURGERY
Payer: COMMERCIAL

## 2020-11-10 DIAGNOSIS — G93.5 CHIARI MALFORMATION TYPE I (HCC): ICD-10-CM

## 2020-11-10 PROCEDURE — 72141 MRI NECK SPINE W/O DYE: CPT | Performed by: NEUROLOGICAL SURGERY

## 2020-11-10 PROCEDURE — 72146 MRI CHEST SPINE W/O DYE: CPT | Performed by: NEUROLOGICAL SURGERY

## 2020-11-10 NOTE — IMAGING NOTE
Call to pt. Pt verified name and . Pt sts has had CT contrast reaction of hives and itching that resolved with pepcid and steroid. Pt sts she has never had MRI contrast, so no known Nicolas allergy.   Pt sts had difficulty with MRI today and may call doct

## 2020-11-11 ENCOUNTER — HOSPITAL ENCOUNTER (OUTPATIENT)
Dept: MRI IMAGING | Facility: HOSPITAL | Age: 35
Discharge: HOME | End: 2020-11-11
Attending: NEUROLOGICAL SURGERY
Payer: COMMERCIAL

## 2020-11-11 ENCOUNTER — TELEPHONE (OUTPATIENT)
Dept: FAMILY MEDICINE CLINIC | Facility: CLINIC | Age: 35
End: 2020-11-11

## 2020-11-11 DIAGNOSIS — G93.5 CHIARI MALFORMATION TYPE I (HCC): ICD-10-CM

## 2020-11-11 RX ORDER — LORAZEPAM 0.5 MG/1
0.5 TABLET ORAL DAILY PRN
Qty: 5 TABLET | Refills: 0 | Status: SHIPPED | OUTPATIENT
Start: 2020-11-11 | End: 2020-11-16

## 2020-11-12 ENCOUNTER — TELEPHONE (OUTPATIENT)
Dept: SURGERY | Facility: CLINIC | Age: 35
End: 2020-11-12

## 2020-11-12 NOTE — TELEPHONE ENCOUNTER
Spoke to [place] who is asking for clarification regarding MRI Brain and if Dr. Juan Ramon Lyman would like MRI brain CINE w/wo , or CINE alone.   An additional order may have to be placed / addended due to CPT codes

## 2020-11-12 NOTE — TELEPHONE ENCOUNTER
Jose M Valente from radiology calling to clarify MRI brain order. Jose M Valente questioning CPT code, if this needs to be a cine study, if so is it both CSF and MRI. Pls advise. If cpt codes change will they need new auth. Pls advise.

## 2020-11-13 ENCOUNTER — HOSPITAL ENCOUNTER (OUTPATIENT)
Dept: MRI IMAGING | Facility: HOSPITAL | Age: 35
Discharge: HOME | End: 2020-11-13
Attending: NEUROLOGICAL SURGERY
Payer: COMMERCIAL

## 2020-11-13 DIAGNOSIS — G93.5 CHIARI MALFORMATION TYPE I (HCC): ICD-10-CM

## 2020-11-13 PROCEDURE — A9575 INJ GADOTERATE MEGLUMI 0.1ML: HCPCS | Performed by: NEUROLOGICAL SURGERY

## 2020-11-13 PROCEDURE — 70553 MRI BRAIN STEM W/O & W/DYE: CPT | Performed by: NEUROLOGICAL SURGERY

## 2020-11-13 NOTE — IMAGING NOTE
Was called to [facility] by MRI Tech Becky Purdy with patient complaining of itching on back. Upon assessment, noted 2 hives on back. Patient denies SOB, tingling sensation of throat. Pt has Hx of CT contrast dye in the past with hives per patient.  Patient stat

## 2020-11-19 ENCOUNTER — TELEMEDICINE (OUTPATIENT)
Dept: NEUROLOGY | Facility: CLINIC | Age: 35
End: 2020-11-19
Payer: COMMERCIAL

## 2020-11-19 ENCOUNTER — TELEPHONE (OUTPATIENT)
Dept: NEUROLOGY | Facility: CLINIC | Age: 35
End: 2020-11-19

## 2020-11-19 ENCOUNTER — TELEPHONE (OUTPATIENT)
Dept: SURGERY | Facility: CLINIC | Age: 35
End: 2020-11-19

## 2020-11-19 DIAGNOSIS — G43.009 MIGRAINE WITHOUT AURA AND WITHOUT STATUS MIGRAINOSUS, NOT INTRACTABLE: ICD-10-CM

## 2020-11-19 DIAGNOSIS — R20.2 PARESTHESIA OF HAND, BILATERAL: ICD-10-CM

## 2020-11-19 DIAGNOSIS — G44.40 ANALGESIC REBOUND HEADACHE: ICD-10-CM

## 2020-11-19 DIAGNOSIS — R51.9 NEW ONSET HEADACHE: Primary | ICD-10-CM

## 2020-11-19 DIAGNOSIS — T39.95XA ANALGESIC REBOUND HEADACHE: ICD-10-CM

## 2020-11-19 DIAGNOSIS — R51.9 CHRONIC DAILY HEADACHE: ICD-10-CM

## 2020-11-19 PROCEDURE — 99203 OFFICE O/P NEW LOW 30 MIN: CPT | Performed by: OTHER

## 2020-11-19 RX ORDER — METHYLPREDNISOLONE 4 MG/1
TABLET ORAL
Qty: 1 PACKAGE | Refills: 0 | Status: SHIPPED | OUTPATIENT
Start: 2020-11-19 | End: 2020-11-30 | Stop reason: ALTCHOICE

## 2020-11-19 RX ORDER — NORTRIPTYLINE HYDROCHLORIDE 10 MG/1
CAPSULE ORAL
Qty: 60 CAPSULE | Refills: 0 | Status: SHIPPED | OUTPATIENT
Start: 2020-11-19 | End: 2020-12-30

## 2020-11-19 RX ORDER — BUTALBITAL, ACETAMINOPHEN AND CAFFEINE 50; 325; 40 MG/1; MG/1; MG/1
TABLET ORAL
Qty: 15 TABLET | Refills: 0 | Status: SHIPPED | OUTPATIENT
Start: 2020-11-19 | End: 2020-12-30

## 2020-11-19 NOTE — PROGRESS NOTES
[facility]   Neurology- INITIAL CLINIC VISIT  2020, 11:09 AM     Yonny Houston Patient Status:  No patient class for patient encounter    1985 MRN [id number]   Location [facility] Stephanie Hartman microhemorrhage and/or focal mineralization. MRI brain 10/24/20  CONCLUSION:    1. 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   This morphology is similar to Chiari malfo daily., Disp: 90 capsule, Rfl: 0  •  Fluticasone Propionate 50 MCG/ACT Nasal Suspension, 2 sprays by Each Nare route daily. , Disp: 3 Bottle, Rfl: 3      Review of Systems:   A comprehensive 10 point review of systems was completed.      Pertinent positives medications, and role of current rescue medication overuse- causing rebound effect  Start headache diary  Stop tramadol, start medrol dose pack, and after completion switch to fiorcet  Trial nortriptyline, and fiorcet as abortive- not more than 2-3 times a

## 2020-11-19 NOTE — TELEPHONE ENCOUNTER
Iliana Welsh MD  [address] 2 Nurse; Maynor Lebron  [facility]             Patient should return to discuss findings and management

## 2020-11-20 NOTE — TELEPHONE ENCOUNTER
Pt called back, stated she spoke w/ someone from our office and that she was offered 12/3, but never rcvd Mychart notification. I apologized to pt, she did not have a scheduled apt for that day. Scheduled pt for 12/23.

## 2020-11-20 NOTE — TELEPHONE ENCOUNTER
Pt called back, stating the original appt she was given (after she r/s due to her being out of town for first appt) was 11/30 at 11 a.m. That is the appt that patient wants and will keep calling until she gets an answer.  Advised patient we will check with

## 2020-11-27 ENCOUNTER — APPOINTMENT (OUTPATIENT)
Dept: LAB | Age: 35
End: 2020-11-27
Attending: FAMILY MEDICINE
Payer: COMMERCIAL

## 2020-11-30 ENCOUNTER — OFFICE VISIT (OUTPATIENT)
Dept: NEUROLOGY | Facility: CLINIC | Age: 35
End: 2020-11-30
Payer: COMMERCIAL

## 2020-11-30 VITALS
HEART RATE: 67 BPM | SYSTOLIC BLOOD PRESSURE: 110 MMHG | BODY MASS INDEX: 41.46 KG/M2 | DIASTOLIC BLOOD PRESSURE: 72 MMHG | WEIGHT: 258 LBS | HEIGHT: 66 IN

## 2020-11-30 DIAGNOSIS — E66.01 CLASS 3 SEVERE OBESITY WITHOUT SERIOUS COMORBIDITY WITH BODY MASS INDEX (BMI) OF 40.0 TO 44.9 IN ADULT, UNSPECIFIED OBESITY TYPE (HCC): ICD-10-CM

## 2020-11-30 DIAGNOSIS — G89.29 CHRONIC BILATERAL LOW BACK PAIN WITHOUT SCIATICA: ICD-10-CM

## 2020-11-30 DIAGNOSIS — M54.50 CHRONIC BILATERAL LOW BACK PAIN WITHOUT SCIATICA: ICD-10-CM

## 2020-11-30 DIAGNOSIS — G93.5 CHIARI I MALFORMATION (HCC): Primary | ICD-10-CM

## 2020-11-30 PROCEDURE — 3008F BODY MASS INDEX DOCD: CPT | Performed by: NEUROLOGICAL SURGERY

## 2020-11-30 PROCEDURE — 99213 OFFICE O/P EST LOW 20 MIN: CPT | Performed by: NEUROLOGICAL SURGERY

## 2020-11-30 PROCEDURE — 3078F DIAST BP <80 MM HG: CPT | Performed by: NEUROLOGICAL SURGERY

## 2020-11-30 PROCEDURE — 3074F SYST BP LT 130 MM HG: CPT | Performed by: NEUROLOGICAL SURGERY

## 2020-11-30 NOTE — PROGRESS NOTES
Neurosurgery Clinic Visit  2020    Mitzi Ansari PCP:  Jose Ferrell DO    1985 MRN [id number]       CC:  Chiari    HPI:    Mario Tripathi is here for f/u after MRI. She has had more neck and arm ache into 2-5 digits, worse at night.   Arms feel h history includes Cancer in her maternal grandfather, mother, and paternal grandfather; High Blood Pressure in her father, maternal grandmother, and paternal grandmother.     SOCIAL HISTORY:   reports that she has never smoked.  She has never used smokeless frontal lobe likely represents an area of chronic microhemorrhage and/or focal mineralization. MRI C/T Spine -     1. Mild levoscoliosis and minimal cervical spondylosis. .  Mild bulging discs C3-4, C4-5 and C5-6  2.  Chiari I malformation with tonsillar

## 2020-11-30 NOTE — PROGRESS NOTES
Patient here for follow-up and MRI imaging review. Patient with continued neck pain. States head pain has moved to front left of head. Occipital pain is now more of a tingling sensation. Both hands with pain and heaviness. Discomfort level rated 6/10.

## 2020-12-12 ENCOUNTER — OFFICE VISIT (OUTPATIENT)
Dept: FAMILY MEDICINE CLINIC | Facility: CLINIC | Age: 35
End: 2020-12-12
Payer: COMMERCIAL

## 2020-12-12 VITALS
SYSTOLIC BLOOD PRESSURE: 110 MMHG | BODY MASS INDEX: 41.62 KG/M2 | DIASTOLIC BLOOD PRESSURE: 80 MMHG | HEART RATE: 104 BPM | TEMPERATURE: 98 F | RESPIRATION RATE: 16 BRPM | WEIGHT: 259 LBS | OXYGEN SATURATION: 97 % | HEIGHT: 66 IN

## 2020-12-12 DIAGNOSIS — L91.8 SKIN TAG: Primary | ICD-10-CM

## 2020-12-12 DIAGNOSIS — G93.5 CHIARI I MALFORMATION (HCC): ICD-10-CM

## 2020-12-12 DIAGNOSIS — L27.0 DRUG RASH: ICD-10-CM

## 2020-12-12 PROCEDURE — 3074F SYST BP LT 130 MM HG: CPT | Performed by: FAMILY MEDICINE

## 2020-12-12 PROCEDURE — 88305 TISSUE EXAM BY PATHOLOGIST: CPT | Performed by: FAMILY MEDICINE

## 2020-12-12 PROCEDURE — 99214 OFFICE O/P EST MOD 30 MIN: CPT | Performed by: FAMILY MEDICINE

## 2020-12-12 PROCEDURE — 3008F BODY MASS INDEX DOCD: CPT | Performed by: FAMILY MEDICINE

## 2020-12-12 PROCEDURE — 3079F DIAST BP 80-89 MM HG: CPT | Performed by: FAMILY MEDICINE

## 2020-12-12 PROCEDURE — 11200 RMVL SKIN TAGS UP TO&INC 15: CPT | Performed by: FAMILY MEDICINE

## 2020-12-12 NOTE — PROGRESS NOTES
Patient presents with:  Acrochordon: skin tag removal   Test Results: MRI results   Reaction: allergic reaction due to medication       HPI:    Skin tag removal: patient reports multiple skin tags around her neck.  These have been bothering her cosmetically swelling, arthralgias and gait problem. Skin: Negative for color change and rash. Neurological: Negative for  syncope, weakness, numbness   Hematological: Negative for adenopathy. Does not bruise/bleed easily.    Psychiatric/Behavioral: The patient is n BOWEL PREP KIT) 17.5-3.13-1.6 GM/177ML Oral Solution Take as directed by physician.  1 kit 0   • Nortriptyline HCl 10 MG Oral Cap Take 1 cap nightly for 3 days, then increase to 2 caps nightly if tolerating 60 capsule 0       Allergies    Gadolinium Derivat range of motion. No edema and no tenderness. No effusions. Lymphadenopathy: No cervical adenopathy. Neurological: Normal reflexes. No cranial nerve deficit or sensory deficit. Normal muscle tone.  Coordination normal.   Skin: Skin tags x 10 (5 right and

## 2020-12-21 ENCOUNTER — PROCEDURE VISIT (OUTPATIENT)
Dept: NEUROLOGY | Facility: CLINIC | Age: 35
End: 2020-12-21
Payer: COMMERCIAL

## 2020-12-21 DIAGNOSIS — R20.2 PARESTHESIA OF HAND, BILATERAL: Primary | ICD-10-CM

## 2020-12-21 PROCEDURE — 95911 NRV CNDJ TEST 9-10 STUDIES: CPT | Performed by: OTHER

## 2020-12-21 PROCEDURE — 95886 MUSC TEST DONE W/N TEST COMP: CPT | Performed by: OTHER

## 2020-12-21 RX ORDER — VENLAFAXINE HYDROCHLORIDE 37.5 MG/1
CAPSULE, EXTENDED RELEASE ORAL
Qty: 7 CAPSULE | Refills: 0 | Status: SHIPPED | OUTPATIENT
Start: 2020-12-21 | End: 2020-12-30

## 2020-12-21 RX ORDER — VENLAFAXINE HYDROCHLORIDE 75 MG/1
75 CAPSULE, EXTENDED RELEASE ORAL DAILY
Qty: 30 CAPSULE | Refills: 0 | Status: SHIPPED | OUTPATIENT
Start: 2020-12-21 | End: 2021-03-27

## 2020-12-21 NOTE — PROCEDURES
[facility]  Nerve Conduction & Electromyography Report            Patient: Maribel Miller  Patient ID: [id number]  Sex: Female  YOB: 1985  Age: 37 Years 6 Months  Referring MJUDSON: Sid Pineda M.D.: Alicja Mendoza, B.Elbow ADM 6.09  13.0  84.1 ?70 91.3     B. Elbow - Ulnar Wrist 24 64 ?49         A. Elbow ADM 7.19  12.5  96.3 ?115 91.9     A. Elbow -    B. Elbow 8 73 ?49           Needle EMG      EMG Summary Table     Spontaneous MUAP Recruitment    IA Fib PSW Fasc

## 2020-12-21 NOTE — PROGRESS NOTES
Nortriptyline was stopped due to patient developing rash. Recommend Effexor for headache treatment, and this may also help her anxiety. Needs follow up appointment.

## 2020-12-22 NOTE — PROGRESS NOTES
[facility] Hematology and Oncology Clinic Note    Diagnosis: ZOHRA likely 2/2 Uterine fibroids     Treatment History:   1. IV Feraheme 10/27/20 and 11/3/20 (with solumedrol 125 mg and pepcid 10 mg pre-medications). Visit Diagnosis:  No diagnosis found.     Hi total) by mouth daily.  Start after completing 37.5mg dose, if tolerated that dose., Disp: 30 capsule, Rfl: 0    •  PEG 3350-KCl-NaBcb-NaCl-NaSulf 236 g Oral Recon Soln, Take as directed by physician, Disp: 4000 mL, Rfl: 0    •  Na Sulfate-K Sulfate-Mg Sulf Paternal Grandfather         lung   • High Blood Pressure Father    • High Blood Pressure Maternal Grandmother    • Cancer Maternal Grandfather         stomach cancer   • High Blood Pressure Paternal Grandmother        Physical Exam  Height: --  Weight: --

## 2020-12-23 ENCOUNTER — OFFICE VISIT (OUTPATIENT)
Dept: HEMATOLOGY/ONCOLOGY | Facility: HOSPITAL | Age: 35
End: 2020-12-23
Attending: INTERNAL MEDICINE
Payer: COMMERCIAL

## 2020-12-23 VITALS
OXYGEN SATURATION: 97 % | SYSTOLIC BLOOD PRESSURE: 137 MMHG | TEMPERATURE: 98 F | DIASTOLIC BLOOD PRESSURE: 81 MMHG | HEART RATE: 99 BPM | BODY MASS INDEX: 42 KG/M2 | WEIGHT: 260 LBS | RESPIRATION RATE: 18 BRPM

## 2020-12-23 DIAGNOSIS — D50.0 IRON DEFICIENCY ANEMIA DUE TO CHRONIC BLOOD LOSS: Primary | ICD-10-CM

## 2020-12-23 PROCEDURE — 99213 OFFICE O/P EST LOW 20 MIN: CPT | Performed by: INTERNAL MEDICINE

## 2021-01-01 NOTE — TELEPHONE ENCOUNTER
Ativan sent tell her to take one tablet before procedure
Called pt to notify that Rx for ativan has been sent to pharmacy. Instructed pt to take 1 tablet 20-30 minutes prior to her MRI, pt verbalized understanding.
Dr. Zulay Espinosa- Pt is having an MRI today and would like something to help relax her during procedure.     Kayla [address], [address] [facility] [address] [place] [address], [phone]    Future Appointments   Date Time Provider Anny Simon   11/11/2020
See TE, would you like to Rx her something for her MRI?
2021 08:28
2021 08:28

## 2021-01-05 ENCOUNTER — E-VISIT (OUTPATIENT)
Dept: TELEHEALTH | Age: 36
End: 2021-01-05

## 2021-01-05 DIAGNOSIS — R30.0 DYSURIA: ICD-10-CM

## 2021-01-05 DIAGNOSIS — N89.8 VAGINAL DISCHARGE: Primary | ICD-10-CM

## 2021-01-05 NOTE — PROGRESS NOTES
Shilpi Aquino is a 38year old female. HPI:   See answers to questions above. Current Outpatient Medications   Medication Sig Dispense Refill   • VITAMIN D, ERGOCALCIFEROL, OR Take by mouth daily.      • Venlafaxine HCl ER 75 MG Oral Capsule SR 24 Hr Ta charge for e-visit.        Meds & Refills for this Visit:  Requested Prescriptions      No prescriptions requested or ordered in this encounter       Duration of  the service:  6 minutes    Patient advised to follow up with PCP if no improvement or worsenin

## 2021-01-06 ENCOUNTER — PATIENT MESSAGE (OUTPATIENT)
Dept: NEUROLOGY | Facility: CLINIC | Age: 36
End: 2021-01-06

## 2021-01-06 ENCOUNTER — TELEPHONE (OUTPATIENT)
Dept: FAMILY MEDICINE CLINIC | Facility: CLINIC | Age: 36
End: 2021-01-06

## 2021-01-06 NOTE — TELEPHONE ENCOUNTER
From: Haleigh Taylor  To: Jude Holt DO  Sent: 1/6/2021 7:58 AM CST  Subject: Prescription Question    Hello Doctor Autumn Gibbs new year! hope you are doing well.  the new medicine you gave me doesnt take the pain away and having too many side

## 2021-01-06 NOTE — TELEPHONE ENCOUNTER
Spoke to patient. Has had urinary sx since December. Has urinary odor and frequency. Was drinking cranberry juice and it got better but now it has come back. Denies blood in urine. Has has clear slimy vaginal discharge.    Has intermittent bladder a

## 2021-01-06 NOTE — TELEPHONE ENCOUNTER
Patient calling, patient did e-visit yesterday. Patient stated to having bodyaches, nausea with urinary symptoms. Patient was advised to do in person visit, please advise.

## 2021-01-07 ENCOUNTER — OFFICE VISIT (OUTPATIENT)
Dept: FAMILY MEDICINE CLINIC | Facility: CLINIC | Age: 36
End: 2021-01-07
Payer: COMMERCIAL

## 2021-01-07 VITALS
HEART RATE: 74 BPM | HEIGHT: 65 IN | WEIGHT: 257 LBS | DIASTOLIC BLOOD PRESSURE: 80 MMHG | SYSTOLIC BLOOD PRESSURE: 132 MMHG | BODY MASS INDEX: 42.82 KG/M2 | TEMPERATURE: 98 F | RESPIRATION RATE: 16 BRPM

## 2021-01-07 DIAGNOSIS — R30.0 DYSURIA: Primary | ICD-10-CM

## 2021-01-07 DIAGNOSIS — N89.8 VAGINAL DISCHARGE: ICD-10-CM

## 2021-01-07 LAB
BILIRUBIN URINE: NEGATIVE
CONTROL RUN WITHIN 24 HOURS?: YES
GLUCOSE URINE: NEGATIVE
KETONE URINE: NEGATIVE
LEUKOCYTE ESTERASE URINE: NEGATIVE
NITRITE URINE: NEGATIVE
PH URINE: 5.5 (ref 5–8)
PROTEIN URINE: NEGATIVE
SPEC GRAVITY: 1.02 (ref 1–1.03)
URINE CLARITY: CLEAR
URINE COLOR: YELLOW
UROBILINOGEN URINE: 0.2

## 2021-01-07 PROCEDURE — 87491 CHLMYD TRACH DNA AMP PROBE: CPT | Performed by: FAMILY MEDICINE

## 2021-01-07 PROCEDURE — 87660 TRICHOMONAS VAGIN DIR PROBE: CPT | Performed by: FAMILY MEDICINE

## 2021-01-07 PROCEDURE — 3008F BODY MASS INDEX DOCD: CPT | Performed by: FAMILY MEDICINE

## 2021-01-07 PROCEDURE — 99214 OFFICE O/P EST MOD 30 MIN: CPT | Performed by: FAMILY MEDICINE

## 2021-01-07 PROCEDURE — 3075F SYST BP GE 130 - 139MM HG: CPT | Performed by: FAMILY MEDICINE

## 2021-01-07 PROCEDURE — 87591 N.GONORRHOEAE DNA AMP PROB: CPT | Performed by: FAMILY MEDICINE

## 2021-01-07 PROCEDURE — 87480 CANDIDA DNA DIR PROBE: CPT | Performed by: FAMILY MEDICINE

## 2021-01-07 PROCEDURE — 87510 GARDNER VAG DNA DIR PROBE: CPT | Performed by: FAMILY MEDICINE

## 2021-01-07 PROCEDURE — 87086 URINE CULTURE/COLONY COUNT: CPT | Performed by: FAMILY MEDICINE

## 2021-01-07 PROCEDURE — 3079F DIAST BP 80-89 MM HG: CPT | Performed by: FAMILY MEDICINE

## 2021-01-07 RX ORDER — NITROFURANTOIN 25; 75 MG/1; MG/1
100 CAPSULE ORAL 2 TIMES DAILY
Qty: 14 CAPSULE | Refills: 0 | Status: SHIPPED | OUTPATIENT
Start: 2021-01-07 | End: 2021-01-14

## 2021-01-07 RX ORDER — GABAPENTIN 100 MG/1
CAPSULE ORAL
Qty: 180 CAPSULE | Refills: 0 | Status: SHIPPED | OUTPATIENT
Start: 2021-01-07 | End: 2021-02-03 | Stop reason: ALTCHOICE

## 2021-01-07 NOTE — PROGRESS NOTES
Patient presents with:  Burning On Urination: Pain and burning for over 2 week. HPI:    Homero Delgadillo present today with complaints of possible urinary tract infection. Symptoms started around 8 days ago. She has dysuria and frequency.   She does not have disease without esophagitis 7/24/2020   • Migraines    • Visual impairment     glasses     Past Surgical History:   Procedure Laterality Date   • COLONOSCOPY     • OTHER      wisdom teeth extraction   • UPPER GI ENDOSCOPY,EXAM       Family History   Proble months & older 0.5 ml Prefilled syringe (14032)                          10/03/2020      Fluvirin, 3 Years & >, Im                          09/26/2012  10/24/2014      HEP B                 10/24/2014      Influenza             09/01/2017 09/01/2019 and agrees to the plan. The patient is asked to return in 3 days if not better. Call if fever, vomiting, worsening symptoms. All results reviewed and discussed with patient. See AVS for detailed discharge instructions for your condition today.   - POCT U

## 2021-01-08 RX ORDER — METRONIDAZOLE 500 MG/1
500 TABLET ORAL 2 TIMES DAILY
Qty: 14 TABLET | Refills: 0 | Status: SHIPPED | OUTPATIENT
Start: 2021-01-08 | End: 2021-01-15

## 2021-01-09 LAB
C TRACH DNA SPEC QL NAA+PROBE: NEGATIVE
N GONORRHOEA DNA SPEC QL NAA+PROBE: NEGATIVE

## 2021-01-09 NOTE — PATIENT INSTRUCTIONS
Vaginal Infection: Understanding the Vaginal Environment  The vagina is a canal. It connects the uterus (womb) to the outside of the body. It's home to many types of bacteria and other tiny organisms.  These different bacteria most often stay balanced in © 1360-4186 [address]. [address] [facility], [address] [place]. All rights reserved. This information is not intended as a substitute for professional medical care. Always follow your healthcare professional's instructions.         Vaginal · Call your healthcare provider if symptoms dot go away within 4 days of starting treatment. Also call if you have a reaction to the medicine. Why treatment matters  Even if you have no symptoms or your symptoms are mild, BV should be treated.  Untreated B · Pyelonephritis. This is a kidney infection. If not treated, it can be serious and damage your kidneys. In severe cases, you may need to stay in the hospital. You may have a fever and lower back pain.     Medicines to treat a UTI  Most UTIs are treated wit Teresa last reviewed this educational content on 7/1/2019  © 7485-6930 [employer] [address]. [facility] [facility], [facility] [place]. All rights reserved. This information is not intended as a substitute for professional medical care.  Always follo

## 2021-01-11 ENCOUNTER — OFFICE VISIT (OUTPATIENT)
Dept: NEUROLOGY | Facility: CLINIC | Age: 36
End: 2021-01-11
Payer: COMMERCIAL

## 2021-01-11 VITALS
HEIGHT: 65 IN | SYSTOLIC BLOOD PRESSURE: 126 MMHG | HEART RATE: 64 BPM | WEIGHT: 255 LBS | DIASTOLIC BLOOD PRESSURE: 72 MMHG | BODY MASS INDEX: 42.49 KG/M2

## 2021-01-11 DIAGNOSIS — G93.5 CHIARI I MALFORMATION (HCC): Primary | ICD-10-CM

## 2021-01-11 PROCEDURE — 3074F SYST BP LT 130 MM HG: CPT | Performed by: NEUROLOGICAL SURGERY

## 2021-01-11 PROCEDURE — 3008F BODY MASS INDEX DOCD: CPT | Performed by: NEUROLOGICAL SURGERY

## 2021-01-11 PROCEDURE — 3078F DIAST BP <80 MM HG: CPT | Performed by: NEUROLOGICAL SURGERY

## 2021-01-11 PROCEDURE — 99213 OFFICE O/P EST LOW 20 MIN: CPT | Performed by: NEUROLOGICAL SURGERY

## 2021-01-11 NOTE — PROGRESS NOTES
Patient here for follow-up. States pain to lower skull, neck and shoulders continues, rated 7/10 at baseline. Worsens at night, flares up to 10/10. Low back pain rated 6/10.

## 2021-01-11 NOTE — PROGRESS NOTES
[facility]  Neurosurgery Clinic Visit      HISTORY OF PRESENT ILLNESS:  Luisito Dover is a(n) 38year old female here for follow-up, has occipital pain and known Chiari malformation. She continues to have severe occipital pain.   Pain is HIVES    MEDICATIONS:  (Not in a hospital admission)    No current facility-administered medications for this visit. REVIEW OF SYSTEMS:  A 10-point system was reviewed. Pertinent positives and negatives are noted in HPI.       PHYSICAL EXAMINATION:

## 2021-01-12 ENCOUNTER — LAB ENCOUNTER (OUTPATIENT)
Dept: LAB | Facility: HOSPITAL | Age: 36
End: 2021-01-12
Attending: INTERNAL MEDICINE
Payer: COMMERCIAL

## 2021-01-12 DIAGNOSIS — D50.0 IRON DEFICIENCY ANEMIA DUE TO CHRONIC BLOOD LOSS: ICD-10-CM

## 2021-01-13 LAB — SARS-COV-2 RNA RESP QL NAA+PROBE: NOT DETECTED

## 2021-01-14 ENCOUNTER — ANESTHESIA EVENT (OUTPATIENT)
Dept: ENDOSCOPY | Facility: HOSPITAL | Age: 36
End: 2021-01-14
Payer: COMMERCIAL

## 2021-01-15 ENCOUNTER — ANESTHESIA (OUTPATIENT)
Dept: ENDOSCOPY | Facility: HOSPITAL | Age: 36
End: 2021-01-15
Payer: COMMERCIAL

## 2021-01-15 ENCOUNTER — HOSPITAL ENCOUNTER (OUTPATIENT)
Facility: HOSPITAL | Age: 36
Setting detail: HOSPITAL OUTPATIENT SURGERY
Discharge: HOME | End: 2021-01-15
Attending: INTERNAL MEDICINE | Admitting: INTERNAL MEDICINE
Payer: COMMERCIAL

## 2021-01-15 VITALS
RESPIRATION RATE: 18 BRPM | WEIGHT: 260 LBS | SYSTOLIC BLOOD PRESSURE: 124 MMHG | OXYGEN SATURATION: 100 % | TEMPERATURE: 98 F | BODY MASS INDEX: 41.78 KG/M2 | HEIGHT: 66 IN | HEART RATE: 94 BPM | DIASTOLIC BLOOD PRESSURE: 63 MMHG

## 2021-01-15 DIAGNOSIS — D50.0 IRON DEFICIENCY ANEMIA DUE TO CHRONIC BLOOD LOSS: Primary | ICD-10-CM

## 2021-01-15 DIAGNOSIS — D50.8 OTHER IRON DEFICIENCY ANEMIA: ICD-10-CM

## 2021-01-15 LAB
POCT LOT NUMBER: NORMAL
POCT URINE PREGNANCY: NEGATIVE

## 2021-01-15 PROCEDURE — 81025 URINE PREGNANCY TEST: CPT | Performed by: INTERNAL MEDICINE

## 2021-01-15 PROCEDURE — 0DB98ZX EXCISION OF DUODENUM, VIA NATURAL OR ARTIFICIAL OPENING ENDOSCOPIC, DIAGNOSTIC: ICD-10-PCS | Performed by: INTERNAL MEDICINE

## 2021-01-15 PROCEDURE — 0DJD8ZZ INSPECTION OF LOWER INTESTINAL TRACT, VIA NATURAL OR ARTIFICIAL OPENING ENDOSCOPIC: ICD-10-PCS | Performed by: INTERNAL MEDICINE

## 2021-01-15 PROCEDURE — 88305 TISSUE EXAM BY PATHOLOGIST: CPT | Performed by: INTERNAL MEDICINE

## 2021-01-15 PROCEDURE — 0DB68ZX EXCISION OF STOMACH, VIA NATURAL OR ARTIFICIAL OPENING ENDOSCOPIC, DIAGNOSTIC: ICD-10-PCS | Performed by: INTERNAL MEDICINE

## 2021-01-15 RX ORDER — LIDOCAINE HYDROCHLORIDE 10 MG/ML
INJECTION, SOLUTION EPIDURAL; INFILTRATION; INTRACAUDAL; PERINEURAL AS NEEDED
Status: DISCONTINUED | OUTPATIENT
Start: 2021-01-15 | End: 2021-01-15 | Stop reason: SURG

## 2021-01-15 RX ORDER — SODIUM CHLORIDE, SODIUM LACTATE, POTASSIUM CHLORIDE, CALCIUM CHLORIDE 600; 310; 30; 20 MG/100ML; MG/100ML; MG/100ML; MG/100ML
INJECTION, SOLUTION INTRAVENOUS CONTINUOUS
Status: DISCONTINUED | OUTPATIENT
Start: 2021-01-15 | End: 2021-01-15

## 2021-01-15 RX ORDER — NALOXONE HYDROCHLORIDE 0.4 MG/ML
80 INJECTION, SOLUTION INTRAMUSCULAR; INTRAVENOUS; SUBCUTANEOUS AS NEEDED
Status: DISCONTINUED | OUTPATIENT
Start: 2021-01-15 | End: 2021-01-15

## 2021-01-15 RX ADMIN — LIDOCAINE HYDROCHLORIDE 25 MG: 10 INJECTION, SOLUTION EPIDURAL; INFILTRATION; INTRACAUDAL; PERINEURAL at 07:50:00

## 2021-01-15 RX ADMIN — SODIUM CHLORIDE, SODIUM LACTATE, POTASSIUM CHLORIDE, CALCIUM CHLORIDE: 600; 310; 30; 20 INJECTION, SOLUTION INTRAVENOUS at 08:18:00

## 2021-01-15 RX ADMIN — SODIUM CHLORIDE, SODIUM LACTATE, POTASSIUM CHLORIDE, CALCIUM CHLORIDE: 600; 310; 30; 20 INJECTION, SOLUTION INTRAVENOUS at 07:45:00

## 2021-01-15 NOTE — OPERATIVE REPORT
Edvin Paredes Patient Status:  Hospital Outpatient Surgery    1985 MRN [id number]   [facility] ENDOSCOPY Attending Russell Davies DO   Hosp Day # 0 PCP Liberty Crum DO       PREOPERATIVE DIAGNOSIS/INDICATION: Iron Defici normal.  Transverse colon: The mucosa and vascular pattern were normal.  Descending colon: The mucosa and vascular pattern were normal.  Sigmoid colon: The mucosa and vascular pattern were normal.  Rectum:  The mucosa and vascular pattern were normal. Retro

## 2021-01-15 NOTE — OPERATIVE REPORT
Shilpi Aquino Patient Status:  Hospital Outpatient Surgery    1985 MRN [id number]   [facility] ENDOSCOPY Attending Taurus Toth DO   Hosp Day # 0 PCP Hillary Dykes DO       PREOPERATIVE DIAGNOSIS/INDICATION: Iron Defi esophagitis at [facility]. 3. These findings are unlikely to explain ZOHRA. RECOMMENDATIONS:    1. Follow up pathology results. 2. Diet and lifestyle modification discussed.        Mendez Silva  Gastroenterology/Advanced [address] [place] [address]

## 2021-01-15 NOTE — H&P
History & Physical Examination    Patient Name: Hayde Echols  MRN: [id number]  CSN: [id number]  YOB: 1985    Diagnosis: iron deficiency anemia    Present Illness: 37 y/o F history as above presents for EGD/Colonoscopy.        •  metRONIDAZOLE Smokeless tobacco: Never Used    Alcohol use: Not Currently      Frequency: Never      Drinks per session: 1 or 2      Binge frequency: Never      Comment: socially      SYSTEM Check if Review is Normal Check if Physical Exam is Normal If not normal, ple

## 2021-01-15 NOTE — ANESTHESIA PREPROCEDURE EVALUATION
PRE-OP EVALUATION    Patient Name: Hayde Echols    Pre-op Diagnosis: Other iron deficiency anemia [D50.8]    Procedure(s):  ESOPHAGOGASTRODUODENOSCOPY (EGD), COLONOSCOPY      Surgeon(s) and Role:     * Usman Freed, DO - Primary    Pre-op vitals review Dental    No notable dental history. Pulmonary    Pulmonary exam normal.  Breath sounds clear to auscultation bilaterally. Other findings            ASA: 2   Plan: MAC  NPO status verified and patient meets guidelines.   Patient has

## 2021-01-15 NOTE — ANESTHESIA POSTPROCEDURE EVALUATION
[facility] Patient Status:  Hospital Outpatient Surgery   Age/Gender 38year old female MRN [id number]   Location [facility]. Attending Wendie Carrizales DO   Hosp Day # 0 PCP Ashok Ovalles DO       Anesthesia Post-op N

## 2021-01-18 ENCOUNTER — PATIENT MESSAGE (OUTPATIENT)
Dept: FAMILY MEDICINE CLINIC | Facility: CLINIC | Age: 36
End: 2021-01-18

## 2021-01-18 NOTE — TELEPHONE ENCOUNTER
From: Nahum Pierre  To: Mo Herr DO  Sent: 1/18/2021 1:39 PM CST  Subject: Non-Urgent Mathew Figueroa Dr May,    I'm having shooting pains like shocks in my vagina. Its been non stop since yesterday.  is this common? its really weird an

## 2021-01-20 ENCOUNTER — TELEPHONE (OUTPATIENT)
Dept: FAMILY MEDICINE CLINIC | Facility: CLINIC | Age: 36
End: 2021-01-20

## 2021-01-20 NOTE — TELEPHONE ENCOUNTER
Received request and authorization on 1/14/2021 from :    [facility]  C/O [address] [facility]  [phone] ext. [phone]    . .. for medical records from last 3 years    Fax/Mail to [facility] [employer] when complete.   · Fax: [phone]

## 2021-01-28 ENCOUNTER — TELEPHONE (OUTPATIENT)
Dept: NEUROLOGY | Facility: CLINIC | Age: 36
End: 2021-01-28

## 2021-01-28 RX ORDER — GABAPENTIN 300 MG/1
300 CAPSULE ORAL 3 TIMES DAILY
Qty: 25 CAPSULE | Refills: 0 | Status: SHIPPED | OUTPATIENT
Start: 2021-01-28 | End: 2021-03-27

## 2021-01-28 NOTE — TELEPHONE ENCOUNTER
Pt headaches did not improve much, and felt more anxious while on it. Pt headache got more severe after running out of medication, but now states that the location is different, that it is now towards the front.   Pt not sure if she was better with or with

## 2021-01-28 NOTE — TELEPHONE ENCOUNTER
Pt states that she feels okay when taking the gabapentin, but when she stopped taking it, she felt \"extremely off\".   She said she noticed more instances of crying for no reason and feeling down while taking the past few medications, but that stops when s

## 2021-01-28 NOTE — TELEPHONE ENCOUNTER
Has upcoming appointment. Not clear, worse off or on gabapentin? If she ran out of it and wants to try taking it again, and it helped the headaches, then we can prescribe dose she is/was taking.

## 2021-01-28 NOTE — TELEPHONE ENCOUNTER
pt states script for Gabapentin states to call the Dr after 14 days to get further instructions; pls advise; okay to leave a message

## 2021-01-28 NOTE — TELEPHONE ENCOUNTER
I would recommend to try higher dose for the next few days before her appointment, 300mg TID, and then we can further evaluate. This ashwin be to verify if she does have side effects on it. If does not feel well on it, can stop. Thanks.

## 2021-02-01 ENCOUNTER — LAB ENCOUNTER (OUTPATIENT)
Dept: LAB | Age: 36
End: 2021-02-01
Attending: FAMILY MEDICINE
Payer: COMMERCIAL

## 2021-02-01 ENCOUNTER — TELEMEDICINE (OUTPATIENT)
Dept: FAMILY MEDICINE CLINIC | Facility: CLINIC | Age: 36
End: 2021-02-01

## 2021-02-01 DIAGNOSIS — J02.9 SORE THROAT: ICD-10-CM

## 2021-02-01 DIAGNOSIS — J02.9 SORE THROAT: Primary | ICD-10-CM

## 2021-02-01 DIAGNOSIS — J06.9 URI, ACUTE: ICD-10-CM

## 2021-02-01 PROCEDURE — 99213 OFFICE O/P EST LOW 20 MIN: CPT | Performed by: FAMILY MEDICINE

## 2021-02-01 RX ORDER — AZITHROMYCIN 250 MG/1
TABLET, FILM COATED ORAL
Qty: 6 TABLET | Refills: 0 | Status: SHIPPED | OUTPATIENT
Start: 2021-02-01 | End: 2021-02-06

## 2021-02-01 NOTE — PROGRESS NOTES
Subjective    This visit is conducted using Telemedicine with live, interactive video and audio.     Chief Complaint:  Patient presents with:  Headache        The patient confirmed knowledge of the limitations of the use of telemedicine were verbally conf Physical Exam:  alert, appears stated age and cooperative, Speaking in full sentences comfortably, Normal work of breathing and age appropriate    Lymph Nodes/Sinuses: non tender; per patient   RESP: Stridor/Wheezing: Not present (per remote audio exam)

## 2021-02-02 LAB — SARS-COV-2 RNA RESP QL NAA+PROBE: NOT DETECTED

## 2021-02-03 ENCOUNTER — OFFICE VISIT (OUTPATIENT)
Dept: NEUROLOGY | Facility: CLINIC | Age: 36
End: 2021-02-03
Payer: COMMERCIAL

## 2021-02-03 ENCOUNTER — TELEPHONE (OUTPATIENT)
Dept: NEUROLOGY | Facility: CLINIC | Age: 36
End: 2021-02-03

## 2021-02-03 VITALS — RESPIRATION RATE: 14 BRPM | HEART RATE: 98 BPM | SYSTOLIC BLOOD PRESSURE: 122 MMHG | DIASTOLIC BLOOD PRESSURE: 76 MMHG

## 2021-02-03 DIAGNOSIS — G93.5 CHIARI I MALFORMATION (HCC): Primary | ICD-10-CM

## 2021-02-03 DIAGNOSIS — R51.9 CHRONIC DAILY HEADACHE: ICD-10-CM

## 2021-02-03 DIAGNOSIS — R20.2 PARESTHESIA OF HAND, BILATERAL: ICD-10-CM

## 2021-02-03 PROCEDURE — 3074F SYST BP LT 130 MM HG: CPT | Performed by: OTHER

## 2021-02-03 PROCEDURE — 99213 OFFICE O/P EST LOW 20 MIN: CPT | Performed by: OTHER

## 2021-02-03 PROCEDURE — 3078F DIAST BP <80 MM HG: CPT | Performed by: OTHER

## 2021-02-03 RX ORDER — TOPIRAMATE 25 MG/1
TABLET ORAL
Qty: 120 TABLET | Refills: 0 | Status: SHIPPED | OUTPATIENT
Start: 2021-02-03 | End: 2021-03-27

## 2021-02-03 NOTE — PATIENT INSTRUCTIONS
Coronavirus Disease 2019 (COVID-19): Caring for Yourself or Others  If you or a household member have symptoms of COVID-19, follow these guidelines for preventing spread of the virus, and managing symptoms.   If you think you have COVID-19 symptoms  · Sta · Tell the healthcare staff about recent travel. This includes local travel on public transport. Staff may need to find other people you have been in contact with. · Follow all instructions the healthcare staff give you.     If you have been diagnosed with The FDA has approved a vaccine to prevent COVID-19 in people 16 years and older who are not pregnant or breastfeeding. It's not currently available to the entire public.  The first phase of vaccine roll-out will go to healthcare staff and residents of long- If you've had confirmed COVID-19, your healthcare team may ask you to consider donating your plasma. This is called COVID-19 convalescent plasma donation.  Plasma from people fully recovered from COVID-19 may contain antibodies to help fight COVID-19 in peo Your limits are different if you've had COVID-19 in the last 3 months but are fully recovered without symptoms and you have been exposed to someone with COVID-19. If you are symptom-free, you don't need to stay home away from others or be retested.  The CDC When you return to public settings  When you are well enough to go outside your home, consider the CDC's guidance on cloth face masks:     · The CDC advises all people over age 2 to wear cloth face masks in public settings when around people outside of the © 2921-4119 [employer] [address]. All rights reserved. This information is not intended as a substitute for professional medical care. Always follow your healthcare professional's instructions.

## 2021-02-03 NOTE — PATIENT INSTRUCTIONS
After your visit at the [facility] office  today,  please direct any follow up questions or medication needs to the staff in our  [address] [place] [address] office so that your concerns may be promptly addressed.   We are available through [employer] or at the numbers below: must be picked up in office. • Please allow the office 2-3 business days to fill the prescription. • Patient must present photo ID at time of . PLEASE NOTE: PRESCRIPTIONS MUST BE PICKED UP PRIOR TO 3:00PM MONDAY-FRIDAY    Scheduling Tests:     If submitting forms to office staff. • Form completion may require an additional fee. • A signed Release of Information (ALTAF) must be on file before forms may be submitted. When dropping off forms, please ask the  for this paper.    • Failure

## 2021-02-03 NOTE — PROGRESS NOTES
[facility] in [place]  Neurology     Shari Grantbello Patient Status:  No patient class for patient encounter    1985 MRN [id number]   Location [unfilled] PCP Hedy Skelton DO          Subjective:  Shari Tucker is a(n) 38 year tab BID, Week 3: 1 tab in am, 2 tabs in pm, Week 4 and onward: 2 tabs BID, Disp: 120 tablet, Rfl: 0  •  azithromycin (ZITHROMAX Z-LAILA) 250 MG Oral Tab, Take 2 tablets (500 mg total) by mouth daily for 1 day, THEN 1 tablet (250 mg total) daily for 4 days. , pleasant,   CN is normal from II to XII, visual field is full, EOMI, pupil symmetrical, light reflexes are present, fundus exam is normal. Face symmetrical with normal sensation, hearing normal, shoulder shrugging normal.   Motor:  NO drift.  JAVI intact, no Alvaro Wen, DO  Neurology and Neuromuscular medicine  [facility]

## 2021-02-23 ENCOUNTER — TELEPHONE (OUTPATIENT)
Dept: FAMILY MEDICINE CLINIC | Facility: CLINIC | Age: 36
End: 2021-02-23

## 2021-02-23 ENCOUNTER — APPOINTMENT (OUTPATIENT)
Dept: ULTRASOUND IMAGING | Facility: HOSPITAL | Age: 36
End: 2021-02-23
Attending: EMERGENCY MEDICINE
Payer: COMMERCIAL

## 2021-02-23 ENCOUNTER — APPOINTMENT (OUTPATIENT)
Dept: GENERAL RADIOLOGY | Facility: HOSPITAL | Age: 36
End: 2021-02-23
Payer: COMMERCIAL

## 2021-02-23 ENCOUNTER — HOSPITAL ENCOUNTER (EMERGENCY)
Facility: HOSPITAL | Age: 36
Discharge: HOME | End: 2021-02-23
Attending: EMERGENCY MEDICINE
Payer: COMMERCIAL

## 2021-02-23 VITALS
BODY MASS INDEX: 40.18 KG/M2 | HEIGHT: 66 IN | WEIGHT: 250 LBS | DIASTOLIC BLOOD PRESSURE: 7 MMHG | TEMPERATURE: 98 F | RESPIRATION RATE: 16 BRPM | SYSTOLIC BLOOD PRESSURE: 120 MMHG | OXYGEN SATURATION: 98 % | HEART RATE: 80 BPM

## 2021-02-23 DIAGNOSIS — R07.89 CHEST PAIN, ATYPICAL: Primary | ICD-10-CM

## 2021-02-23 LAB
ALBUMIN SERPL-MCNC: 3.6 G/DL (ref 3.4–5)
ALBUMIN/GLOB SERPL: 0.9 {RATIO} (ref 1–2)
ALP LIVER SERPL-CCNC: 94 U/L
ALT SERPL-CCNC: 42 U/L
ANION GAP SERPL CALC-SCNC: 6 MMOL/L (ref 0–18)
AST SERPL-CCNC: 14 U/L (ref 15–37)
BASOPHILS # BLD AUTO: 0.04 X10(3) UL (ref 0–0.2)
BASOPHILS NFR BLD AUTO: 0.4 %
BILIRUB SERPL-MCNC: 0.4 MG/DL (ref 0.1–2)
BUN BLD-MCNC: 11 MG/DL (ref 7–18)
BUN/CREAT SERPL: 11.8 (ref 10–20)
CALCIUM BLD-MCNC: 8.9 MG/DL (ref 8.5–10.1)
CHLORIDE SERPL-SCNC: 110 MMOL/L (ref 98–112)
CO2 SERPL-SCNC: 23 MMOL/L (ref 21–32)
CREAT BLD-MCNC: 0.93 MG/DL
D-DIMER: <0.27 UG/ML FEU (ref ?–0.5)
DEPRECATED RDW RBC AUTO: 42.7 FL (ref 35.1–46.3)
EOSINOPHIL # BLD AUTO: 0.11 X10(3) UL (ref 0–0.7)
EOSINOPHIL NFR BLD AUTO: 1 %
ERYTHROCYTE [DISTWIDTH] IN BLOOD BY AUTOMATED COUNT: 13.3 % (ref 11–15)
GLOBULIN PLAS-MCNC: 3.9 G/DL (ref 2.8–4.4)
GLUCOSE BLD-MCNC: 84 MG/DL (ref 70–99)
HCT VFR BLD AUTO: 39.7 %
HGB BLD-MCNC: 13 G/DL
IMM GRANULOCYTES # BLD AUTO: 0.03 X10(3) UL (ref 0–1)
IMM GRANULOCYTES NFR BLD: 0.3 %
LYMPHOCYTES # BLD AUTO: 2.49 X10(3) UL (ref 1–4)
LYMPHOCYTES NFR BLD AUTO: 23.5 %
M PROTEIN MFR SERPL ELPH: 7.5 G/DL (ref 6.4–8.2)
MCH RBC QN AUTO: 28.9 PG (ref 26–34)
MCHC RBC AUTO-ENTMCNC: 32.7 G/DL (ref 31–37)
MCV RBC AUTO: 88.2 FL
MONOCYTES # BLD AUTO: 0.62 X10(3) UL (ref 0.1–1)
MONOCYTES NFR BLD AUTO: 5.8 %
NEUTROPHILS # BLD AUTO: 7.32 X10 (3) UL (ref 1.5–7.7)
NEUTROPHILS # BLD AUTO: 7.32 X10(3) UL (ref 1.5–7.7)
NEUTROPHILS NFR BLD AUTO: 69 %
OSMOLALITY SERPL CALC.SUM OF ELEC: 287 MOSM/KG (ref 275–295)
PLATELET # BLD AUTO: 349 10(3)UL (ref 150–450)
POTASSIUM SERPL-SCNC: 3.8 MMOL/L (ref 3.5–5.1)
RBC # BLD AUTO: 4.5 X10(6)UL
SARS-COV-2 RNA RESP QL NAA+PROBE: NOT DETECTED
SODIUM SERPL-SCNC: 139 MMOL/L (ref 136–145)
TROPONIN I SERPL-MCNC: <0.045 NG/ML (ref ?–0.04)
WBC # BLD AUTO: 10.6 X10(3) UL (ref 4–11)

## 2021-02-23 PROCEDURE — 85025 COMPLETE CBC W/AUTO DIFF WBC: CPT | Performed by: EMERGENCY MEDICINE

## 2021-02-23 PROCEDURE — 96374 THER/PROPH/DIAG INJ IV PUSH: CPT

## 2021-02-23 PROCEDURE — 85379 FIBRIN DEGRADATION QUANT: CPT | Performed by: EMERGENCY MEDICINE

## 2021-02-23 PROCEDURE — 76700 US EXAM ABDOM COMPLETE: CPT | Performed by: EMERGENCY MEDICINE

## 2021-02-23 PROCEDURE — 93010 ELECTROCARDIOGRAM REPORT: CPT

## 2021-02-23 PROCEDURE — 84484 ASSAY OF TROPONIN QUANT: CPT

## 2021-02-23 PROCEDURE — 99285 EMERGENCY DEPT VISIT HI MDM: CPT

## 2021-02-23 PROCEDURE — 71045 X-RAY EXAM CHEST 1 VIEW: CPT

## 2021-02-23 PROCEDURE — 93005 ELECTROCARDIOGRAM TRACING: CPT

## 2021-02-23 PROCEDURE — 80053 COMPREHEN METABOLIC PANEL: CPT | Performed by: EMERGENCY MEDICINE

## 2021-02-23 PROCEDURE — 84484 ASSAY OF TROPONIN QUANT: CPT | Performed by: EMERGENCY MEDICINE

## 2021-02-23 PROCEDURE — 80053 COMPREHEN METABOLIC PANEL: CPT

## 2021-02-23 PROCEDURE — 85025 COMPLETE CBC W/AUTO DIFF WBC: CPT

## 2021-02-23 RX ORDER — CYCLOBENZAPRINE HCL 10 MG
10 TABLET ORAL 3 TIMES DAILY PRN
Qty: 20 TABLET | Refills: 0 | Status: SHIPPED | OUTPATIENT
Start: 2021-02-23 | End: 2021-03-02

## 2021-02-23 RX ORDER — LIDOCAINE HYDROCHLORIDE 20 MG/ML
10 SOLUTION OROPHARYNGEAL ONCE
Status: COMPLETED | OUTPATIENT
Start: 2021-02-23 | End: 2021-02-23

## 2021-02-23 RX ORDER — KETOROLAC TROMETHAMINE 30 MG/ML
15 INJECTION, SOLUTION INTRAMUSCULAR; INTRAVENOUS ONCE
Status: COMPLETED | OUTPATIENT
Start: 2021-02-23 | End: 2021-02-23

## 2021-02-23 RX ORDER — CYCLOBENZAPRINE HCL 10 MG
10 TABLET ORAL 3 TIMES DAILY PRN
Status: DISCONTINUED | OUTPATIENT
Start: 2021-02-23 | End: 2021-02-24

## 2021-02-23 RX ORDER — MAGNESIUM HYDROXIDE/ALUMINUM HYDROXICE/SIMETHICONE 120; 1200; 1200 MG/30ML; MG/30ML; MG/30ML
30 SUSPENSION ORAL ONCE
Status: COMPLETED | OUTPATIENT
Start: 2021-02-23 | End: 2021-02-23

## 2021-02-23 NOTE — ED INITIAL ASSESSMENT (HPI)
C/o intermittent midsternal pressure since Saturday. States it radiated to the R side of her chest over the weekend. Reports she felt like her heart was racing over the weekend but did not check her pulse. Recently started taking topamax.

## 2021-02-23 NOTE — TELEPHONE ENCOUNTER
Spoke to pt, c/o chest pain x 3 days. States pain comes and goes. States deep breathing helps with pain.  Also feels like heart is racing.  +dizziness  Pt states she was started on Topiramate a couple of weeks ago and has been having numbness/tingling in fe

## 2021-02-23 NOTE — ED NOTES
Pt sts CP started on Saturday. Pt sts CP is almost constant but she has waves where the pain is worse. Pt sts she flew back from [place] on Thursday. C/o L lower back pain. Denies injury. Family at bedside. Pt denies any needs at this time.

## 2021-02-23 NOTE — TELEPHONE ENCOUNTER
Received voicemail from pt stating that she is at work and won't be able to take any calls until 11:15am when she goes on lunch. Pt asked me to call her back around then.

## 2021-02-24 ENCOUNTER — PATIENT MESSAGE (OUTPATIENT)
Dept: NEUROLOGY | Facility: CLINIC | Age: 36
End: 2021-02-24

## 2021-02-24 DIAGNOSIS — G43.009 MIGRAINE WITHOUT AURA AND WITHOUT STATUS MIGRAINOSUS, NOT INTRACTABLE: Primary | ICD-10-CM

## 2021-02-24 LAB
ATRIAL RATE: 83 BPM
P AXIS: 35 DEGREES
P-R INTERVAL: 150 MS
Q-T INTERVAL: 374 MS
QRS DURATION: 86 MS
QTC CALCULATION (BEZET): 439 MS
R AXIS: -30 DEGREES
T AXIS: -6 DEGREES
VENTRICULAR RATE: 83 BPM

## 2021-02-24 NOTE — ED NOTES
Round on pt. Pt sts CP remains the same after medication. Pt updated on care. Denies any needs at this time.

## 2021-02-24 NOTE — TELEPHONE ENCOUNTER
From: Cristina Muñoz  To: Rohan Villalobos DO  Sent: 2/24/2021 10:55 AM CST  Subject: Prescription Question    Hi Dr. Lin Astudillo, I gave Topamax a try and it's not a good fit. I have been having chest pains for three days.  I was in the emergency room yester

## 2021-02-25 NOTE — ED PROVIDER NOTES
Patient Seen in: [facility] Emergency Department      History   Patient presents with:  Chest Pain Angina    Stated Complaint:     HPI/Subjective:   HPI    Pleasant 33-year-old woman presents to the emergency department with chest pain.   She has a h session: 1 or 2      Binge frequency: Never      Comment: socially    Drug use: Never             Review of Systems    Positive for stated complaint:   Other systems are as noted in HPI. Constitutional and vital signs reviewed.       All other systems revi respiratory distress. Breath sounds: Normal breath sounds. No stridor. No decreased breath sounds or wheezing. Chest:      Chest wall: Tenderness present.       Comments: Tenderness noted in right chest  Abdominal:      General: Bowel sounds are norm Rhythm  Readin bpm, normal sinus rhythm, T wave inversions in the inferior leads, this was seen on EKG from 2020 as well. So no acute ST elevation or depression to suggest acute ischemia.   Normal intervals noted prominent S waves across the sept size and mediastinal contours are normal. Lungs clear.      Otherwise unremarkable.                                     =====    CONCLUSION:  Negative single-view chest.                   Dictated by (CST): Cheikh Keith MD on 2/23/2021 at 5:56 PM 11:09 pm    Follow-up:  [facility]  [address] [facility]  [phone]    Schedule an appointment as soon as possible for a visit            Medications Prescribed:  Discharge Medication List as of 2/23/2021 11:37 PM

## 2021-02-26 RX ORDER — METHYLPREDNISOLONE 4 MG/1
TABLET ORAL
Qty: 1 PACKAGE | Refills: 0 | Status: SHIPPED | OUTPATIENT
Start: 2021-02-26 | End: 2021-03-27 | Stop reason: ALTCHOICE

## 2021-02-26 NOTE — TELEPHONE ENCOUNTER
S-spoke with pt regarding condition update on medication. B-LOV 2/3/21. Switched from Gabapentin to Topiramate due to side effects. A-states at [address] [place] [address] she spoke with Dr. Mark Woodard regarding switching to Topiramate.  Was told side effects to look for and

## 2021-02-26 NOTE — TELEPHONE ENCOUNTER
Jeannine [place] calling b/c she was in the ER, told to stop the Topamax, so she has no pain meds and has a huge headache now, please advise.

## 2021-03-01 RX ORDER — DIVALPROEX SODIUM 250 MG/1
TABLET, EXTENDED RELEASE ORAL
Qty: 60 TABLET | Refills: 0 | Status: SHIPPED | OUTPATIENT
Start: 2021-03-01 | End: 2021-04-05

## 2021-03-02 ENCOUNTER — PATIENT MESSAGE (OUTPATIENT)
Dept: NEUROLOGY | Facility: CLINIC | Age: 36
End: 2021-03-02

## 2021-03-02 DIAGNOSIS — G43.009 MIGRAINE WITHOUT AURA AND WITHOUT STATUS MIGRAINOSUS, NOT INTRACTABLE: Primary | ICD-10-CM

## 2021-03-02 NOTE — TELEPHONE ENCOUNTER
From: Maribel Miller  To: Virginia Covington DO  Sent: 3/2/2021 11:26 AM CST  Subject: Prescription Question    good morning Alicja Tang,    does this affect since I have a fatty liver?       thanks a bunch,  Maribel Miller

## 2021-03-03 NOTE — TELEPHONE ENCOUNTER
Patient notified of Dr Argueta Broadmarilee response and recommendation regarding Depakote and affects on liver. Patient instructed to have lab work done 3 weeks after starting Depakote. Order was placed by Dr Poly Romero on 3/2/21.

## 2021-03-11 ENCOUNTER — TELEPHONE (OUTPATIENT)
Dept: NEUROLOGY | Facility: CLINIC | Age: 36
End: 2021-03-11

## 2021-03-16 ENCOUNTER — PATIENT MESSAGE (OUTPATIENT)
Dept: NEUROLOGY | Facility: CLINIC | Age: 36
End: 2021-03-16

## 2021-03-16 NOTE — TELEPHONE ENCOUNTER
From: Yuliet Brown  To: Beba Potter DO  Sent: 3/16/2021 2:17 PM CDT  Subject: Prescription Question    Hi Dr. Lashell Krueger, I'm on my second week of medicine! I'm not feeling any relief. I have actually been in alot of pain in the back of head.  A new t

## 2021-03-18 ENCOUNTER — PATIENT MESSAGE (OUTPATIENT)
Dept: NEUROLOGY | Facility: CLINIC | Age: 36
End: 2021-03-18

## 2021-03-19 NOTE — TELEPHONE ENCOUNTER
From: Chris Watson  To: Francheska Jacobson DO  Sent: 3/18/2021 6:50 PM CDT  Subject: Prescription Question    Dr. Haylie Dubon, I will try to increase dosage and see how that works! If it doesn't we can try botox! I will follow up with Dr. Nayely Reynolds. Thank you!

## 2021-03-21 NOTE — PROGRESS NOTES
[facility] Hematology and Oncology Clinic Note    Diagnosis: ZOHRA likely 2/2 Uterine fibroids     Treatment History:   1. IV Feraheme 10/27/20 and 11/3/20 (with solumedrol 125 mg and pepcid 10 mg pre-medications).      Visit Diagnosis:  Iron deficiency anemia du abdomen from 2/23/21 with fatty liver. She states that she still has diffuse joint aches and feels fatigued. She states that NSGY has recommended surgery for her Chiari malformation but she is still thinking about it.   She still has heavy menses but th 2003  [COMPLETED] Lidocaine Viscous HCl (XYLOCAINE) 2 % mouth solution 10 mL, 10 mL, Mouth/Throat, Once, Master Monzon MD, 10 mL at 02/23/21 2003      Past Medical History:   Diagnosis Date   • Abnormal brain MRI 10/26/2020   • Anemia    • Anxiety    • Aditi Lorenzo inguinal LAD  CV: RRR S1S2 no murmurs  Extremities: No edema   Lungs: CTAB, no increased work of breathing  Abd: soft nt nd +BS no hepatosplenomegaly  Neuro: CN: II-XII grossly intact      Results:  Lab Results   Component Value Date    WBC 8.8 03/23/2021

## 2021-03-23 ENCOUNTER — OFFICE VISIT (OUTPATIENT)
Dept: HEMATOLOGY/ONCOLOGY | Facility: HOSPITAL | Age: 36
End: 2021-03-23
Attending: INTERNAL MEDICINE
Payer: COMMERCIAL

## 2021-03-23 VITALS
OXYGEN SATURATION: 98 % | HEART RATE: 106 BPM | WEIGHT: 254 LBS | RESPIRATION RATE: 16 BRPM | DIASTOLIC BLOOD PRESSURE: 65 MMHG | TEMPERATURE: 98 F | HEIGHT: 65.98 IN | BODY MASS INDEX: 40.82 KG/M2 | SYSTOLIC BLOOD PRESSURE: 125 MMHG

## 2021-03-23 DIAGNOSIS — D50.0 IRON DEFICIENCY ANEMIA DUE TO CHRONIC BLOOD LOSS: Primary | ICD-10-CM

## 2021-03-23 LAB
ALBUMIN SERPL-MCNC: 3.3 G/DL (ref 3.4–5)
ALBUMIN/GLOB SERPL: 0.8 {RATIO} (ref 1–2)
ALP LIVER SERPL-CCNC: 94 U/L
ALT SERPL-CCNC: 35 U/L
ANION GAP SERPL CALC-SCNC: 4 MMOL/L (ref 0–18)
AST SERPL-CCNC: 17 U/L (ref 15–37)
BASOPHILS # BLD AUTO: 0.04 X10(3) UL (ref 0–0.2)
BASOPHILS NFR BLD AUTO: 0.5 %
BILIRUB SERPL-MCNC: 0.3 MG/DL (ref 0.1–2)
BUN BLD-MCNC: 12 MG/DL (ref 7–18)
BUN/CREAT SERPL: 16.7 (ref 10–20)
CALCIUM BLD-MCNC: 9 MG/DL (ref 8.5–10.1)
CHLORIDE SERPL-SCNC: 106 MMOL/L (ref 98–112)
CO2 SERPL-SCNC: 29 MMOL/L (ref 21–32)
CREAT BLD-MCNC: 0.72 MG/DL
DEPRECATED HBV CORE AB SER IA-ACNC: 95.7 NG/ML
DEPRECATED RDW RBC AUTO: 42 FL (ref 35.1–46.3)
EOSINOPHIL # BLD AUTO: 0.12 X10(3) UL (ref 0–0.7)
EOSINOPHIL NFR BLD AUTO: 1.4 %
ERYTHROCYTE [DISTWIDTH] IN BLOOD BY AUTOMATED COUNT: 12.7 % (ref 11–15)
GLOBULIN PLAS-MCNC: 4.3 G/DL (ref 2.8–4.4)
GLUCOSE BLD-MCNC: 75 MG/DL (ref 70–99)
HCT VFR BLD AUTO: 39.2 %
HGB BLD-MCNC: 12.5 G/DL
IMM GRANULOCYTES # BLD AUTO: 0.04 X10(3) UL (ref 0–1)
IMM GRANULOCYTES NFR BLD: 0.5 %
IRON SATURATION: 13 %
IRON SERPL-MCNC: 50 UG/DL
LDH SERPL L TO P-CCNC: 152 U/L
LYMPHOCYTES # BLD AUTO: 2.42 X10(3) UL (ref 1–4)
LYMPHOCYTES NFR BLD AUTO: 27.6 %
M PROTEIN MFR SERPL ELPH: 7.6 G/DL (ref 6.4–8.2)
MCH RBC QN AUTO: 28.7 PG (ref 26–34)
MCHC RBC AUTO-ENTMCNC: 31.9 G/DL (ref 31–37)
MCV RBC AUTO: 90.1 FL
MONOCYTES # BLD AUTO: 0.72 X10(3) UL (ref 0.1–1)
MONOCYTES NFR BLD AUTO: 8.2 %
NEUTROPHILS # BLD AUTO: 5.44 X10 (3) UL (ref 1.5–7.7)
NEUTROPHILS # BLD AUTO: 5.44 X10(3) UL (ref 1.5–7.7)
NEUTROPHILS NFR BLD AUTO: 61.8 %
OSMOLALITY SERPL CALC.SUM OF ELEC: 286 MOSM/KG (ref 275–295)
PATIENT FASTING Y/N/NP: NO
PLATELET # BLD AUTO: 333 10(3)UL (ref 150–450)
POTASSIUM SERPL-SCNC: 3.9 MMOL/L (ref 3.5–5.1)
RBC # BLD AUTO: 4.35 X10(6)UL
SODIUM SERPL-SCNC: 139 MMOL/L (ref 136–145)
TOTAL IRON BINDING CAPACITY: 383 UG/DL (ref 240–450)
TRANSFERRIN SERPL-MCNC: 257 MG/DL (ref 200–360)
WBC # BLD AUTO: 8.8 X10(3) UL (ref 4–11)

## 2021-03-23 PROCEDURE — 80061 LIPID PANEL: CPT | Performed by: FAMILY MEDICINE

## 2021-03-23 PROCEDURE — 99213 OFFICE O/P EST LOW 20 MIN: CPT | Performed by: INTERNAL MEDICINE

## 2021-03-27 ENCOUNTER — OFFICE VISIT (OUTPATIENT)
Dept: FAMILY MEDICINE CLINIC | Facility: CLINIC | Age: 36
End: 2021-03-27
Payer: COMMERCIAL

## 2021-03-27 VITALS
DIASTOLIC BLOOD PRESSURE: 74 MMHG | RESPIRATION RATE: 16 BRPM | WEIGHT: 253 LBS | OXYGEN SATURATION: 99 % | HEART RATE: 103 BPM | TEMPERATURE: 98 F | SYSTOLIC BLOOD PRESSURE: 122 MMHG | HEIGHT: 66 IN | BODY MASS INDEX: 40.66 KG/M2

## 2021-03-27 DIAGNOSIS — M25.50 PAIN IN JOINT, MULTIPLE SITES: ICD-10-CM

## 2021-03-27 DIAGNOSIS — Z00.00 WELL ADULT EXAM: Primary | ICD-10-CM

## 2021-03-27 DIAGNOSIS — F41.1 GAD (GENERALIZED ANXIETY DISORDER): ICD-10-CM

## 2021-03-27 DIAGNOSIS — R79.89 HIGH SERUM LOW-DENSITY LIPOPROTEIN (LDL): ICD-10-CM

## 2021-03-27 DIAGNOSIS — G93.5 CHIARI MALFORMATION TYPE I (HCC): ICD-10-CM

## 2021-03-27 LAB
CHOLEST SMN-MCNC: 198 MG/DL (ref ?–200)
HDLC SERPL-MCNC: 56 MG/DL (ref 40–59)
LDLC SERPL CALC-MCNC: 108 MG/DL (ref ?–100)
NONHDLC SERPL-MCNC: 142 MG/DL (ref ?–130)
TRIGL SERPL-MCNC: 168 MG/DL (ref 30–149)
VLDLC SERPL CALC-MCNC: 34 MG/DL (ref 0–30)

## 2021-03-27 PROCEDURE — 3078F DIAST BP <80 MM HG: CPT | Performed by: FAMILY MEDICINE

## 2021-03-27 PROCEDURE — 3074F SYST BP LT 130 MM HG: CPT | Performed by: FAMILY MEDICINE

## 2021-03-27 PROCEDURE — 99395 PREV VISIT EST AGE 18-39: CPT | Performed by: FAMILY MEDICINE

## 2021-03-27 PROCEDURE — 3008F BODY MASS INDEX DOCD: CPT | Performed by: FAMILY MEDICINE

## 2021-03-27 RX ORDER — DULOXETIN HYDROCHLORIDE 30 MG/1
30 CAPSULE, DELAYED RELEASE ORAL DAILY
Qty: 30 CAPSULE | Refills: 0 | Status: SHIPPED | OUTPATIENT
Start: 2021-03-27 | End: 2021-04-26

## 2021-03-27 NOTE — PROGRESS NOTES
Patient presents with:  Referral: Eye doctor and Rheum doctor. HPI:    she has a hx of fibroids. She sees Gyn for it. Used to be on nexplanon for it. She removed it. Has occasional abdominal cramping from the fibroids that will last a few days.  She myalgias, back pain,and gait problem. + arthralgias  Skin: Negative for color change and rash. Neurological: Negative for  syncope, weakness, numbness, tingling . + dizzy, headaches   Hematological: Negative for adenopathy. Does not bruise/bleed easily. Tablet 24 Hr Day 1-4 take 1 tab nightly, then increase to 2 tabs nightly if tolerating. If questionable whether tolerating, stay at 1 tab nightly 60 tablet 0   • Fexofenadine-Pseudoephedrine (ALLEGRA-D OR) Take 1 tablet by mouth daily as needed. deficit or sensory deficit. Normal muscle tone. Coordination normal.   Skin: Skin is warm and dry. No rash noted. No erythema. No pallor. Psychiatric: Normal mood and affect.      A/P:    Chiari malformation type i (hcc)  (primary encounter diagnosis)  Pa INTERNAL  NEUROSURGERY - INTERNAL      No follow-ups on file. Patient Instructions       Prevention Guidelines, Women Ages 25 to 44  Screening tests and vaccines are an important part of managing your health.  A screening test is done to find possible di Gonorrhea Sexually active women at increased risk for infection  At routine exams   Hepatitis C Anyone at increased risk  At routine exams   HIV All women At routine exams3     Obesity All women in this age group  At routine exams   Syphilis Women at [facility] increased risk for infection should talk with their healthcare provider  PCV13: 1 dose ages 23 to 72 (protects against 13 types of pneumococcal bacteria)   PPSV23: 1 to 2 doses through age 59, or 1 dose at 72 or older (protects against 23 types of pneumoco content on 10/1/2017  © 7216-7382 [employer]. All rights reserved. This information is not intended as a substitute for professional medical care. Always follow your healthcare professional's instructions.             All questions were answe

## 2021-03-30 ENCOUNTER — PATIENT MESSAGE (OUTPATIENT)
Dept: FAMILY MEDICINE CLINIC | Facility: CLINIC | Age: 36
End: 2021-03-30

## 2021-03-30 NOTE — TELEPHONE ENCOUNTER
From: Svetlana Murders  To: Eileen Cevallos DO  Sent: 3/30/2021 4:41 PM CDT  Subject: Non-Urgent Medical Question    Hi Dr. Nessa Jose, can you send me a prescription for infection? I get really hot and I'm getting alot of discharge in vagina area.  It feels lik

## 2021-04-05 DIAGNOSIS — R51.9 CHRONIC DAILY HEADACHE: ICD-10-CM

## 2021-04-05 DIAGNOSIS — G43.009 MIGRAINE WITHOUT AURA AND WITHOUT STATUS MIGRAINOSUS, NOT INTRACTABLE: Primary | ICD-10-CM

## 2021-04-05 NOTE — TELEPHONE ENCOUNTER
Medication: divalproex Sodium ER     Date of last refill: 3/1/21 (#60/0)  [place]     Last office visit: 2/3/2021  Due back to clinic per last office note:  8 weeks  Date next office visit scheduled:    Future Appointmen

## 2021-04-06 RX ORDER — DIVALPROEX SODIUM 250 MG/1
TABLET, EXTENDED RELEASE ORAL
Qty: 60 TABLET | Refills: 0 | Status: SHIPPED | OUTPATIENT
Start: 2021-04-06 | End: 2021-04-22

## 2021-04-20 ENCOUNTER — PATIENT MESSAGE (OUTPATIENT)
Dept: FAMILY MEDICINE CLINIC | Facility: CLINIC | Age: 36
End: 2021-04-20

## 2021-04-20 RX ORDER — AMITRIPTYLINE HYDROCHLORIDE 10 MG/1
10 TABLET, FILM COATED ORAL NIGHTLY
Qty: 30 TABLET | Refills: 0 | Status: SHIPPED | OUTPATIENT
Start: 2021-04-20 | End: 2021-06-18

## 2021-04-20 NOTE — TELEPHONE ENCOUNTER
From: Mitzi Ansari  To: Jose Ferrell DO  Sent: 4/20/2021 11:18 AM CDT  Subject: Visit Yousuf Lerma,    I tried the new medicine for a week and I get really sick. My stomach hurts and get nauseous.     I dont like it, is there an

## 2021-04-22 ENCOUNTER — OFFICE VISIT (OUTPATIENT)
Dept: NEUROLOGY | Facility: CLINIC | Age: 36
End: 2021-04-22
Payer: COMMERCIAL

## 2021-04-22 ENCOUNTER — PATIENT MESSAGE (OUTPATIENT)
Dept: FAMILY MEDICINE CLINIC | Facility: CLINIC | Age: 36
End: 2021-04-22

## 2021-04-22 ENCOUNTER — LAB ENCOUNTER (OUTPATIENT)
Dept: LAB | Age: 36
End: 2021-04-22
Attending: Other
Payer: COMMERCIAL

## 2021-04-22 VITALS
HEART RATE: 88 BPM | SYSTOLIC BLOOD PRESSURE: 124 MMHG | RESPIRATION RATE: 14 BRPM | BODY MASS INDEX: 41 KG/M2 | DIASTOLIC BLOOD PRESSURE: 76 MMHG | WEIGHT: 253 LBS

## 2021-04-22 DIAGNOSIS — R20.2 PARESTHESIA OF HAND, BILATERAL: ICD-10-CM

## 2021-04-22 DIAGNOSIS — G43.009 MIGRAINE WITHOUT AURA AND WITHOUT STATUS MIGRAINOSUS, NOT INTRACTABLE: ICD-10-CM

## 2021-04-22 DIAGNOSIS — G47.19 EXCESSIVE DAYTIME SLEEPINESS: ICD-10-CM

## 2021-04-22 DIAGNOSIS — Z86.2 HISTORY OF ANEMIA: ICD-10-CM

## 2021-04-22 DIAGNOSIS — R51.9 CHRONIC DAILY HEADACHE: ICD-10-CM

## 2021-04-22 DIAGNOSIS — G93.5 CHIARI I MALFORMATION (HCC): Primary | ICD-10-CM

## 2021-04-22 PROCEDURE — 80053 COMPREHEN METABOLIC PANEL: CPT | Performed by: OTHER

## 2021-04-22 PROCEDURE — 36415 COLL VENOUS BLD VENIPUNCTURE: CPT | Performed by: OTHER

## 2021-04-22 PROCEDURE — 99214 OFFICE O/P EST MOD 30 MIN: CPT | Performed by: OTHER

## 2021-04-22 PROCEDURE — 3078F DIAST BP <80 MM HG: CPT | Performed by: OTHER

## 2021-04-22 PROCEDURE — 3074F SYST BP LT 130 MM HG: CPT | Performed by: OTHER

## 2021-04-22 RX ORDER — DIVALPROEX SODIUM 250 MG/1
TABLET, EXTENDED RELEASE ORAL
Qty: 60 TABLET | Refills: 1 | Status: SHIPPED | OUTPATIENT
Start: 2021-04-22 | End: 2021-06-11

## 2021-04-22 NOTE — PROGRESS NOTES
[facility] [place]  Neurology     Chris Shirley Patient Status:  No patient class for patient encounter    1985 MRN [id number]   Location [unfilled] PCP Larry Freeman DO          Subjective:  Chris Watson is a(n) 38 year focus of gradient susceptibility along the posterior right frontal lobe likely represents an area of chronic microhemorrhage and/or focal mineralization.        Component      Latest Ref Rng & Units 3/23/2021 2/23/2021   WBC      4.0 - 11.0 x10(3) uL 8.8 10 Reported on 4/22/2021 ), Disp: 30 capsule, Rfl: 0    REVIEW OF SYSTEMS:  General: denies any fever or chills. Eye: no pain or redness;  Ear, mouth, throat: no hearing change, no throat pain or soreness;   Respiratory: Denies: Difficulty Breathing, Chron tests:     Labs:  Lab Results   Component Value Date    GLU 75 03/23/2021    BUN 12 03/23/2021    CREATSERUM 0.72 03/23/2021    BUNCREA 16.7 03/23/2021    ANIONGAP 4 03/23/2021    GFRAA 124 03/23/2021    GFRNAA 107 03/23/2021    CA 9.0 03/23/2021

## 2021-04-22 NOTE — TELEPHONE ENCOUNTER
From: Hayde Echols  To: Ashok Ovalles DO  Sent: 4/22/2021 1:38 PM CDT  Subject: Visit Follow-up Question    Dr. Angelita Suarez,    attached please find my fmla papers. They do need to be faxed to them. I really appreciate your help.     I had my visit with alvarado

## 2021-04-29 ENCOUNTER — PATIENT MESSAGE (OUTPATIENT)
Dept: FAMILY MEDICINE CLINIC | Facility: CLINIC | Age: 36
End: 2021-04-29

## 2021-04-29 NOTE — TELEPHONE ENCOUNTER
I need more specifics on how much time she needs off and how often etc. Can she do a video with me tomorrow.

## 2021-04-30 NOTE — TELEPHONE ENCOUNTER
Spoke to patient.  Scheduled video appt for tomorrow  Future Appointments   Date Time Provider Anny Simon   5/1/2021 10:45 AM Jeermiah Og DO [facility] [address]   5/17/2021  9:15 PM SCHEDULE BY DATE CLAUDE Yung   6/1/2021  1:00 PM Wallace Adames

## 2021-04-30 NOTE — TELEPHONE ENCOUNTER
From: Mikal Simon  To: Aurora Ferrer DO  Sent: 4/29/2021 9:37 PM CDT  Subject: Visit Follow-up Question    Claus Zarate, I work tomorrow! Can it be during my lunch at 11:30? I did speak with Dr. Raman Villasenor about my FMLA papers last visit.  They need to be fa

## 2021-05-01 ENCOUNTER — TELEMEDICINE (OUTPATIENT)
Dept: FAMILY MEDICINE CLINIC | Facility: CLINIC | Age: 36
End: 2021-05-01

## 2021-05-01 DIAGNOSIS — G93.5 CHIARI MALFORMATION TYPE I (HCC): Primary | ICD-10-CM

## 2021-05-01 DIAGNOSIS — K21.9 GASTROESOPHAGEAL REFLUX DISEASE WITHOUT ESOPHAGITIS: ICD-10-CM

## 2021-05-01 PROCEDURE — 99213 OFFICE O/P EST LOW 20 MIN: CPT | Performed by: FAMILY MEDICINE

## 2021-05-01 RX ORDER — OMEPRAZOLE 40 MG/1
40 CAPSULE, DELAYED RELEASE ORAL DAILY
Qty: 90 CAPSULE | Refills: 0 | Status: SHIPPED | OUTPATIENT
Start: 2021-05-01 | End: 2021-07-30

## 2021-05-03 ENCOUNTER — TELEPHONE (OUTPATIENT)
Dept: FAMILY MEDICINE CLINIC | Facility: CLINIC | Age: 36
End: 2021-05-03

## 2021-05-03 NOTE — TELEPHONE ENCOUNTER
Faxed [facility] ppw over to Naomi at [phone]. Copy made and sent to scan. Original ppw placed in my red patient ppw folder If needed.

## 2021-05-04 NOTE — PROGRESS NOTES
Subjective    This visit is conducted using Telemedicine with live, interactive video and audio. Chief Complaint:  Patient presents with:   Follow - Up        The patient confirmed knowledge of the limitations of the use of telemedicine were verbally c History:   Procedure Laterality Date   • COLONOSCOPY     • COLONOSCOPY N/A 1/15/2021    Procedure: COLONOSCOPY;  Surgeon: Wendie Carrizales DO;  Location: [facility] ENDOSCOPY   • OTHER      wisdom teeth extraction   • UPPER GI ENDOSCOPY,EXAM        Family History symptoms or acute distress.          Ashlyn Chandler, DO

## 2021-05-11 ENCOUNTER — TELEPHONE (OUTPATIENT)
Dept: FAMILY MEDICINE CLINIC | Facility: CLINIC | Age: 36
End: 2021-05-11

## 2021-05-11 ENCOUNTER — PATIENT MESSAGE (OUTPATIENT)
Dept: FAMILY MEDICINE CLINIC | Facility: CLINIC | Age: 36
End: 2021-05-11

## 2021-05-11 NOTE — TELEPHONE ENCOUNTER
I thought we had discussed 2 days. In my medical opinion to get three days out of five day work week I'd prefer a neurosurgeon to make that decision if it is really necessary.

## 2021-05-11 NOTE — TELEPHONE ENCOUNTER
From: Shilpi Aquino  To: Hillary Dykes DO  Sent: 5/11/2021 10:28 AM CDT  Subject: Visit Follow-up Question    Good morning Dr. Theodore Najera,      I received the [facility] paperwork but I noticed that is said two days per week. I had asked you for more days.  Is it

## 2021-05-11 NOTE — TELEPHONE ENCOUNTER
Patient states she thought the doctor was giving her 3 days per week on the FMLA ppw, please advise.

## 2021-05-17 ENCOUNTER — PATIENT MESSAGE (OUTPATIENT)
Dept: FAMILY MEDICINE CLINIC | Facility: CLINIC | Age: 36
End: 2021-05-17

## 2021-05-17 NOTE — TELEPHONE ENCOUNTER
See TE 5/11/21    Jeremiah Og, DO    5/11/21 11:18 AM  Note      I thought we had discussed 2 days. In my medical opinion to get three days out of five day work week I'd prefer a neurosurgeon to make that decision if it is really necessary.          Pt

## 2021-05-17 NOTE — TELEPHONE ENCOUNTER
From: Yuliet Brown  To: Regis Guido DO  Sent: 5/17/2021 1:56 PM CDT  Subject: Visit Pamela Artist Dr. Caridad Lehman, I received a call from nurse but I called back. I haven't heard back about my FMLA. I had asked for more days.  Thank you so m

## 2021-05-18 ENCOUNTER — PATIENT MESSAGE (OUTPATIENT)
Dept: FAMILY MEDICINE CLINIC | Facility: CLINIC | Age: 36
End: 2021-05-18

## 2021-05-18 NOTE — TELEPHONE ENCOUNTER
How many days per week does she want to be off? We did go over the fact the she needs two days off of work during her video visit. . 3 days though?  If her symptoms have progressed I want more of a description from her on how her symptoms have progress

## 2021-05-18 NOTE — TELEPHONE ENCOUNTER
From: Edvin Paredes  To: Liberty Crum DO  Sent: 5/18/2021 3:25 PM CDT  Subject: Visit [facility] Dr. Karla Martinez,    per our conversation we had I thought there was no confusion on my symptoms.  It is unfortunate that my symptoms have progr

## 2021-05-20 NOTE — TELEPHONE ENCOUNTER
Noted, on her FMLA paperwork I did change it to 3 days/week starting 4/25/2021 through 4/25/2022. I left the paperwork on your desk. Please verify if that is what she wanted and can fax over.   Thank you

## 2021-06-01 ENCOUNTER — OFFICE VISIT (OUTPATIENT)
Dept: RHEUMATOLOGY | Facility: CLINIC | Age: 36
End: 2021-06-01
Payer: COMMERCIAL

## 2021-06-01 VITALS
HEIGHT: 66 IN | TEMPERATURE: 98 F | DIASTOLIC BLOOD PRESSURE: 85 MMHG | BODY MASS INDEX: 41.78 KG/M2 | SYSTOLIC BLOOD PRESSURE: 120 MMHG | WEIGHT: 260 LBS | HEART RATE: 81 BPM

## 2021-06-01 DIAGNOSIS — G25.81 RESTLESS LEGS SYNDROME (RLS): ICD-10-CM

## 2021-06-01 DIAGNOSIS — G56.03 BILATERAL CARPAL TUNNEL SYNDROME: ICD-10-CM

## 2021-06-01 DIAGNOSIS — M54.2 CERVICALGIA: ICD-10-CM

## 2021-06-01 DIAGNOSIS — M62.830 BACK SPASM: Primary | ICD-10-CM

## 2021-06-01 PROCEDURE — 3079F DIAST BP 80-89 MM HG: CPT | Performed by: INTERNAL MEDICINE

## 2021-06-01 PROCEDURE — 3074F SYST BP LT 130 MM HG: CPT | Performed by: INTERNAL MEDICINE

## 2021-06-01 PROCEDURE — 99204 OFFICE O/P NEW MOD 45 MIN: CPT | Performed by: INTERNAL MEDICINE

## 2021-06-01 PROCEDURE — 3008F BODY MASS INDEX DOCD: CPT | Performed by: INTERNAL MEDICINE

## 2021-06-01 RX ORDER — TIZANIDINE 2 MG/1
TABLET ORAL NIGHTLY
Qty: 60 TABLET | Refills: 0 | Status: SHIPPED | OUTPATIENT
Start: 2021-06-01 | End: 2021-09-30

## 2021-06-01 RX ORDER — METHYLPREDNISOLONE 4 MG/1
TABLET ORAL
Qty: 1 EACH | Refills: 0 | Status: SHIPPED | OUTPATIENT
Start: 2021-06-01 | End: 2021-06-16 | Stop reason: ALTCHOICE

## 2021-06-01 NOTE — PROGRESS NOTES
Rheumatology New Patient Note  =====================================================================================================      Date of visit: 6/1/2021  ? Patient presents with:  Establish Care: New pt, referred by neuro for joint pain.  Feet like crawling and tingling up back.        14 point ROS negative except noted above    Medications:  methylPREDNISolone (MEDROL) 4 MG Oral Tablet Therapy Pack, As directed., Disp: 1 each, Rfl: 0  tiZANidine HCl 2 MG Oral Tab, Take 1-2 tablets (2-4 mg total) Smokeless tobacco: Never Used    Vaping Use      Vaping Use: Never used    Alcohol use: Not Currently      Comment: socially    Drug use: Never    ?   Allergies:    Gadolinium Derivati*    HIVES    Comment:Developed 2 hives on back and itching post 03/23/2021    EOABSO 0.12 03/23/2021    BAABSO 0.04 03/23/2021     Lab Results   Component Value Date    GLU 96 04/22/2021    BUN 11 04/22/2021    BUNCREA 15.5 04/22/2021    CREATSERUM 0.71 04/22/2021    ANIONGAP 5 04/22/2021    GFRNAA 109 04/22/2021    GF Radiology review:         12/2020 EMG/NCS  Summary:   1. Both median sensory responses were normal.  2. Both ulnar and the left radial sensory response were normal  3. Both median motor responses were normal, as were the ulnar motor responses.   4 directed. Cervicalgia    Bilateral carpal tunnel syndrome  -     Elastic Bandages & Supports (WRIST BRACE DELUXE) Does not apply Misc; 2 Application by Does not apply route nightly.  For bilateral carpal tunnel syndrome, prescribe bilateral wrist splints

## 2021-06-07 ENCOUNTER — OFFICE VISIT (OUTPATIENT)
Dept: SLEEP CENTER | Age: 36
End: 2021-06-07
Attending: INTERNAL MEDICINE
Payer: COMMERCIAL

## 2021-06-07 DIAGNOSIS — G93.5 CHIARI I MALFORMATION (HCC): ICD-10-CM

## 2021-06-07 DIAGNOSIS — G47.19 EXCESSIVE DAYTIME SLEEPINESS: ICD-10-CM

## 2021-06-07 PROCEDURE — 95806 SLEEP STUDY UNATT&RESP EFFT: CPT

## 2021-06-08 ENCOUNTER — OFFICE VISIT (OUTPATIENT)
Dept: SLEEP CENTER | Age: 36
End: 2021-06-08
Attending: INTERNAL MEDICINE
Payer: COMMERCIAL

## 2021-06-08 DIAGNOSIS — R51.9 CHRONIC DAILY HEADACHE: ICD-10-CM

## 2021-06-08 DIAGNOSIS — G93.5 CHIARI I MALFORMATION (HCC): ICD-10-CM

## 2021-06-08 DIAGNOSIS — G47.19 EXCESSIVE DAYTIME SLEEPINESS: ICD-10-CM

## 2021-06-08 PROCEDURE — 95806 SLEEP STUDY UNATT&RESP EFFT: CPT

## 2021-06-11 DIAGNOSIS — R51.9 CHRONIC DAILY HEADACHE: ICD-10-CM

## 2021-06-11 DIAGNOSIS — G43.009 MIGRAINE WITHOUT AURA AND WITHOUT STATUS MIGRAINOSUS, NOT INTRACTABLE: ICD-10-CM

## 2021-06-11 NOTE — TELEPHONE ENCOUNTER
Medication: divalproex Sodium ER (DEPAKOTE ER) 250 MG     Date of last refill: 4/22/21 (#60/1)  [place]     Last office visit: 4/22/2021  Due back to clinic per last office note:  Not noted  Date next office visit schedu

## 2021-06-13 RX ORDER — DIVALPROEX SODIUM 250 MG/1
TABLET, EXTENDED RELEASE ORAL
Qty: 60 TABLET | Refills: 0 | Status: SHIPPED | OUTPATIENT
Start: 2021-06-13 | End: 2021-07-12

## 2021-06-15 NOTE — PROCEDURES
[address]       Accredited by the [facility] ([facility])    PATIENT'S NAME:        Robert Montemayor PHYSICIAN:   Pilo Cai M.D. REFERRING PHYSICIAN:   Gege Bryant.  DO MANNY Lawton Jered. If there is still concern for underlying sleep disorder, the patient should return to the sleep lab for a formal in-laboratory sleep study. 2.   The patient should avoid driving or operating heavy machinery while sleepy.     Dictated By Joey Sampson

## 2021-06-16 ENCOUNTER — OFFICE VISIT (OUTPATIENT)
Dept: SURGERY | Facility: CLINIC | Age: 36
End: 2021-06-16
Payer: COMMERCIAL

## 2021-06-16 VITALS
BODY MASS INDEX: 41.78 KG/M2 | HEIGHT: 66 IN | DIASTOLIC BLOOD PRESSURE: 82 MMHG | WEIGHT: 260 LBS | SYSTOLIC BLOOD PRESSURE: 122 MMHG | HEART RATE: 76 BPM

## 2021-06-16 DIAGNOSIS — G89.29 CHRONIC BILATERAL LOW BACK PAIN WITHOUT SCIATICA: Primary | ICD-10-CM

## 2021-06-16 DIAGNOSIS — M54.50 CHRONIC BILATERAL LOW BACK PAIN WITHOUT SCIATICA: Primary | ICD-10-CM

## 2021-06-16 PROCEDURE — 99213 OFFICE O/P EST LOW 20 MIN: CPT | Performed by: NEUROLOGICAL SURGERY

## 2021-06-16 PROCEDURE — 3079F DIAST BP 80-89 MM HG: CPT | Performed by: NEUROLOGICAL SURGERY

## 2021-06-16 PROCEDURE — 3008F BODY MASS INDEX DOCD: CPT | Performed by: NEUROLOGICAL SURGERY

## 2021-06-16 PROCEDURE — 3074F SYST BP LT 130 MM HG: CPT | Performed by: NEUROLOGICAL SURGERY

## 2021-06-16 RX ORDER — DICLOFENAC SODIUM 75 MG/1
75 TABLET, DELAYED RELEASE ORAL 2 TIMES DAILY
Qty: 60 TABLET | Refills: 1 | Status: SHIPPED | OUTPATIENT
Start: 2021-06-16 | End: 2021-08-02

## 2021-06-16 NOTE — PROGRESS NOTES
Patient here for follow-up on headaches and chiari malformation. States symptoms have progressed. Headaches are always there. Notes depakote mostly working at night. Notes random shooting back pain, bilateral feet and hand pain.  Finished oral steroids, hel

## 2021-06-16 NOTE — PROGRESS NOTES
[facility]  Neurosurgery Clinic Visit      HISTORY OF PRESENT ILLNESS:  Devendra Ramires is a(n) 38year old female here for follow-up for known chiari malformation. She reports worsening of her headaches recently.   Has the same occipital *    HIVES    MEDICATIONS:  (Not in a hospital admission)    No current facility-administered medications for this visit. REVIEW OF SYSTEMS:  A 10-point system was reviewed. Pertinent positives and negatives are noted in HPI.       PHYSICAL EXAMINATIO

## 2021-06-18 ENCOUNTER — OFFICE VISIT (OUTPATIENT)
Dept: NEUROLOGY | Facility: CLINIC | Age: 36
End: 2021-06-18
Payer: COMMERCIAL

## 2021-06-18 ENCOUNTER — TELEPHONE (OUTPATIENT)
Dept: NEUROLOGY | Facility: CLINIC | Age: 36
End: 2021-06-18

## 2021-06-18 VITALS
WEIGHT: 260 LBS | SYSTOLIC BLOOD PRESSURE: 108 MMHG | RESPIRATION RATE: 16 BRPM | DIASTOLIC BLOOD PRESSURE: 70 MMHG | BODY MASS INDEX: 42 KG/M2 | HEART RATE: 80 BPM

## 2021-06-18 DIAGNOSIS — R51.9 CHRONIC DAILY HEADACHE: ICD-10-CM

## 2021-06-18 DIAGNOSIS — R20.2 PARESTHESIA OF HAND, BILATERAL: ICD-10-CM

## 2021-06-18 DIAGNOSIS — G47.19 EXCESSIVE DAYTIME SLEEPINESS: ICD-10-CM

## 2021-06-18 DIAGNOSIS — G93.5 CHIARI I MALFORMATION (HCC): Primary | ICD-10-CM

## 2021-06-18 PROCEDURE — 99215 OFFICE O/P EST HI 40 MIN: CPT | Performed by: OTHER

## 2021-06-18 PROCEDURE — 3074F SYST BP LT 130 MM HG: CPT | Performed by: OTHER

## 2021-06-18 PROCEDURE — 3078F DIAST BP <80 MM HG: CPT | Performed by: OTHER

## 2021-06-18 RX ORDER — PROPRANOLOL HYDROCHLORIDE 40 MG/1
TABLET ORAL
Qty: 90 TABLET | Refills: 0 | Status: SHIPPED | OUTPATIENT
Start: 2021-06-18 | End: 2021-07-25

## 2021-06-18 NOTE — TELEPHONE ENCOUNTER
Pharmacy calling to verify Propranolol okay with tizanidine. Provider note not yet signed, will route to provider to advise.

## 2021-06-18 NOTE — PROGRESS NOTES
Patient reports she always has headaches back of head on left side, sometimes a shooting pain which increased with cough, sneeze or laughing. Has been experiencing some hand and feet pain. Was dx's with carpal tunnel.

## 2021-06-18 NOTE — PROGRESS NOTES
[facility] in [place]  Neurology     Haleigh Taylor Patient Status:  No patient class for patient encounter    1985 MRN [id number]   Location [unfilled] PCP Karlos Hutchinson,           Subjective:  Haleigh Brandon is a(n) 38 year reveal a sleep apnea to be present, this cannot be definitively stated given the technical limitation.         EMG 12/21/20- no mononeuropathy, or radiculopathy in the upper extremities    MRI brain CSF 11/2020  CONCLUSION:         1.  No acute intracranial Comment:Developed 2 hives on back and itching post             procedure  Piper                   ANGIOEDEMA  Radiology Contrast *    HIVES    MEDICATIONS: EMR reviewed   Current Outpatient Medications:   •  Propranolol HCl 40 MG Oral Tab, Week 1-2: take 1 period 03/13/2021. Body mass index is 41.97 kg/m². Vitals reviewed. Physical Exam:  General Exam:  Appearance: well developed,  nurished , in no acute distress,   Pink Conjunctiva;   Neck: supple, no bruits;  Cardiac: S1/S2 RRR  Lungs: CTA B/L;  Musculosk to switch to propranolol, hopefully will tolerate, and overlap with Depakote, with plan to wean. Also needs to repeat sleep study, to evaluate for contributing TR. Home study was non-diagnostic. Needs sleep center sleep study.  Call with update in 3-4 week

## 2021-06-18 NOTE — TELEPHONE ENCOUNTER
Spoke to patient. Instructed her to cut down tizanidine to 1mg nightly, since it can interact with propranolol and cause more drowsiness related to the tizanidine. Either way, she should be very careful and watch for an increase in drowsiness.  Ok to Clemente Selby

## 2021-06-21 NOTE — TELEPHONE ENCOUNTER
[facility] is calling back, Brittani Garcia is asking about the propananal., I told them there was a message left but they didn't get it.  Please call at [phone]

## 2021-06-29 ENCOUNTER — HOSPITAL ENCOUNTER (OUTPATIENT)
Dept: GENERAL RADIOLOGY | Age: 36
Discharge: HOME | End: 2021-06-29
Attending: NEUROLOGICAL SURGERY
Payer: COMMERCIAL

## 2021-06-29 DIAGNOSIS — G89.29 CHRONIC BILATERAL LOW BACK PAIN WITHOUT SCIATICA: ICD-10-CM

## 2021-06-29 DIAGNOSIS — M54.50 CHRONIC BILATERAL LOW BACK PAIN WITHOUT SCIATICA: ICD-10-CM

## 2021-06-29 PROCEDURE — 72114 X-RAY EXAM L-S SPINE BENDING: CPT | Performed by: NEUROLOGICAL SURGERY

## 2021-07-09 ENCOUNTER — OFFICE VISIT (OUTPATIENT)
Dept: FAMILY MEDICINE CLINIC | Facility: CLINIC | Age: 36
End: 2021-07-09
Payer: COMMERCIAL

## 2021-07-09 VITALS
DIASTOLIC BLOOD PRESSURE: 80 MMHG | HEART RATE: 82 BPM | RESPIRATION RATE: 16 BRPM | TEMPERATURE: 98 F | OXYGEN SATURATION: 98 % | SYSTOLIC BLOOD PRESSURE: 124 MMHG | WEIGHT: 261 LBS | HEIGHT: 66 IN | BODY MASS INDEX: 41.95 KG/M2

## 2021-07-09 DIAGNOSIS — R60.0 BILATERAL LEG EDEMA: Primary | ICD-10-CM

## 2021-07-09 PROCEDURE — 3008F BODY MASS INDEX DOCD: CPT | Performed by: FAMILY MEDICINE

## 2021-07-09 PROCEDURE — 3079F DIAST BP 80-89 MM HG: CPT | Performed by: FAMILY MEDICINE

## 2021-07-09 PROCEDURE — 3074F SYST BP LT 130 MM HG: CPT | Performed by: FAMILY MEDICINE

## 2021-07-09 PROCEDURE — 99214 OFFICE O/P EST MOD 30 MIN: CPT | Performed by: FAMILY MEDICINE

## 2021-07-09 RX ORDER — FUROSEMIDE 20 MG/1
10 TABLET ORAL DAILY
Qty: 7 TABLET | Refills: 0 | Status: SHIPPED | OUTPATIENT
Start: 2021-07-09 | End: 2021-07-16

## 2021-07-09 NOTE — PROGRESS NOTES
Patient presents with:  Leg Swelling: Bilateral leg swelling- ashy legs and peeling skin. She did stop propranol for a couple days due to neurologist until pt talks to PCP about leg swelling.       HPI:    Patient has chronic lower extremity swelling bilate depression.     Patient Active Problem List:     Fibroids     Infertility, female     Iron deficiency anemia due to chronic blood loss     Gastroesophageal reflux disease without esophagitis     Abnormal brain MRI     Chiari malformation type I ([facility]     Bi Does not apply Misc 2 Application by Does not apply route nightly. For bilateral carpal tunnel syndrome, prescribe bilateral wrist splints to be worn nightly.  2 each 0   • Omeprazole 40 MG Oral Capsule Delayed Release Take 1 capsule (40 mg total) by mouth masses, no organomegaly or hernias. Musculoskeletal: Normal range of motion. Mild case of nonpitting bilateral lower extremity edema up to mid shin.   No lower extremity skin wounds, pulses are 2+ in both ankles, there is some dry skin noted on the left l body settles in the lowest part because of gravity. This is why the legs and feet are most affected.   Some of the causes for edema include:  · Disease of the heart such as congestive heart failure  · Standing or sitting for long periods of time  · Infectio to stop daytime leg swelling. · If your provider says that heart disease is causing your leg swelling, follow a low-salt diet to stop extra fluid from staying in your body. You may also need medicine.   Follow-up care  Follow up with your healthcare provid

## 2021-07-11 DIAGNOSIS — R51.9 CHRONIC DAILY HEADACHE: ICD-10-CM

## 2021-07-11 DIAGNOSIS — G43.009 MIGRAINE WITHOUT AURA AND WITHOUT STATUS MIGRAINOSUS, NOT INTRACTABLE: ICD-10-CM

## 2021-07-12 NOTE — TELEPHONE ENCOUNTER
Medication: DIVALPROEX SODIUM  MG    Date of last refill: 6/13/21 (#60/0)  [place]     Last office visit: 6/18/2021  Due back to clinic per last office note:  3 months  Date next office visit scheduled:    Future A almost idental latency, no carpal tunnel evidence. Paresthesias may be Chiari related, or very brief median nerve irritation. Wrist pain is not neuropathic.    Return in about 3 months (around 9/18/2021).

## 2021-07-13 RX ORDER — DIVALPROEX SODIUM 250 MG/1
TABLET, EXTENDED RELEASE ORAL
Qty: 60 TABLET | Refills: 1 | Status: SHIPPED | OUTPATIENT
Start: 2021-07-13 | End: 2021-08-02 | Stop reason: ALTCHOICE

## 2021-07-15 ENCOUNTER — TELEPHONE (OUTPATIENT)
Dept: NEUROLOGY | Facility: CLINIC | Age: 36
End: 2021-07-15

## 2021-07-15 NOTE — TELEPHONE ENCOUNTER
LM that we have called patient several times to reschedule Dr. Hays Rang appt to David mackenzie.   LM that 7/19/21 appt is now cancelled and told pt to call to reschedule

## 2021-07-25 DIAGNOSIS — R51.9 CHRONIC DAILY HEADACHE: Primary | ICD-10-CM

## 2021-07-25 DIAGNOSIS — G43.009 MIGRAINE WITHOUT AURA AND WITHOUT STATUS MIGRAINOSUS, NOT INTRACTABLE: ICD-10-CM

## 2021-07-26 NOTE — TELEPHONE ENCOUNTER
Medication: Propranolol HCl 40 MG       Date of last refill: 6/18/21 (#90/0)  [place]    Last office visit: 6/18/21  Due back to clinic per last office note:  3 months  Date next office visit scheduled:    Future Appo latency, no carpal tunnel evidence. Paresthesias may be Chiari related, or very brief median nerve irritation.  Wrist pain is not neuropathic.

## 2021-07-27 ENCOUNTER — TELEPHONE (OUTPATIENT)
Dept: PHYSICAL THERAPY | Facility: HOSPITAL | Age: 36
End: 2021-07-27

## 2021-07-27 RX ORDER — PROPRANOLOL HYDROCHLORIDE 40 MG/1
TABLET ORAL
Qty: 270 TABLET | Refills: 0 | Status: SHIPPED | OUTPATIENT
Start: 2021-07-27 | End: 2021-09-10

## 2021-07-28 ENCOUNTER — OFFICE VISIT (OUTPATIENT)
Dept: PHYSICAL THERAPY | Age: 36
End: 2021-07-28
Attending: NEUROLOGICAL SURGERY
Payer: COMMERCIAL

## 2021-07-28 DIAGNOSIS — M54.50 CHRONIC BILATERAL LOW BACK PAIN WITHOUT SCIATICA: ICD-10-CM

## 2021-07-28 DIAGNOSIS — G89.29 CHRONIC BILATERAL LOW BACK PAIN WITHOUT SCIATICA: ICD-10-CM

## 2021-07-28 PROCEDURE — 97110 THERAPEUTIC EXERCISES: CPT

## 2021-07-28 PROCEDURE — 97162 PT EVAL MOD COMPLEX 30 MIN: CPT

## 2021-07-28 NOTE — PROGRESS NOTES
SPINE EVALUATION:   Referring Physician: Dr. Brent Mcpherson  Diagnosis: Lumbar Instability     Date of Service: 7/28/2021     PATIENT SUMMARY   Zayda Bach is a 38year old y/o female who presents to therapy today with complaints of a 3 month history of achin impairments to increase stability of the spine and improve overall functionality. Precautions:  None  OBJECTIVE:   Observation/Posture:  The patient is an obese female, who presents with a structural assessment of an increase in lumbar lordosis and tho limitations and be able to correct them independently. 3. Patient will have an increase in spinal mobility to good stability in order to return to sustained sitting as required for work.     4. Patient will have an increase in core strength to assist wit

## 2021-07-30 ENCOUNTER — TELEPHONE (OUTPATIENT)
Dept: PHYSICAL THERAPY | Facility: HOSPITAL | Age: 36
End: 2021-07-30

## 2021-08-02 ENCOUNTER — OFFICE VISIT (OUTPATIENT)
Dept: SURGERY | Facility: CLINIC | Age: 36
End: 2021-08-02
Payer: COMMERCIAL

## 2021-08-02 VITALS
DIASTOLIC BLOOD PRESSURE: 68 MMHG | HEART RATE: 71 BPM | OXYGEN SATURATION: 99 % | WEIGHT: 260 LBS | BODY MASS INDEX: 42 KG/M2 | RESPIRATION RATE: 18 BRPM | SYSTOLIC BLOOD PRESSURE: 104 MMHG

## 2021-08-02 DIAGNOSIS — M54.50 CHRONIC BILATERAL LOW BACK PAIN WITHOUT SCIATICA: ICD-10-CM

## 2021-08-02 DIAGNOSIS — G89.29 CHRONIC BILATERAL LOW BACK PAIN WITHOUT SCIATICA: ICD-10-CM

## 2021-08-02 DIAGNOSIS — G93.5 CHIARI MALFORMATION TYPE I (HCC): Primary | ICD-10-CM

## 2021-08-02 PROCEDURE — 99213 OFFICE O/P EST LOW 20 MIN: CPT | Performed by: NEUROLOGICAL SURGERY

## 2021-08-02 PROCEDURE — 3078F DIAST BP <80 MM HG: CPT | Performed by: NEUROLOGICAL SURGERY

## 2021-08-02 PROCEDURE — 3074F SYST BP LT 130 MM HG: CPT | Performed by: NEUROLOGICAL SURGERY

## 2021-08-02 NOTE — PROGRESS NOTES
Physical therapy started 1wk ago. Following up with at home exercises. Patient states low back pain w/o change.

## 2021-08-02 NOTE — PROGRESS NOTES
[facility]  Neurosurgery Clinic Visit      HISTORY OF PRESENT ILLNESS:  Li Solis is a(n) 38year old female here for follow-up for chiari malformation, low back pain. Her headaches are different, overall improved on propranolol.  Ins negatives are noted in HPI. PHYSICAL EXAMINATION:  VITAL SIGNS: /68   Pulse 71   Resp 18   Wt 260 lb (117.9 kg)   LMP 07/02/2021   SpO2 99%   BMI 41.97 kg/m²   GENERAL:  Patient is a 38year old female in no acute distress.   NEUROLOGICAL:     Co

## 2021-08-04 ENCOUNTER — OFFICE VISIT (OUTPATIENT)
Dept: PHYSICAL THERAPY | Age: 36
End: 2021-08-04
Attending: NEUROLOGICAL SURGERY
Payer: COMMERCIAL

## 2021-08-04 PROCEDURE — 97110 THERAPEUTIC EXERCISES: CPT

## 2021-08-04 NOTE — PROGRESS NOTES
Dx: Lumbar Instability         Authorized # of Visits:  PPO (12 Visits)         Next MD visit: none scheduled  Fall Risk: standard         Precautions: n/a      Last MD Note: 07/28/2021     Goal Number: 12     Subjective: States that the spine is doing ok.

## 2021-08-06 ENCOUNTER — OFFICE VISIT (OUTPATIENT)
Dept: PHYSICAL THERAPY | Age: 36
End: 2021-08-06
Attending: NEUROLOGICAL SURGERY
Payer: COMMERCIAL

## 2021-08-06 PROCEDURE — 97110 THERAPEUTIC EXERCISES: CPT

## 2021-08-06 NOTE — PROGRESS NOTES
Dx: Lumbar Instability         Authorized # of Visits:  PPO (12 Visits)         Next MD visit: none scheduled  Fall Risk: standard         Precautions: n/a      Last MD Note: 07/28/2021     Goal Number: 12     Subjective: States that the low back pain is m Total Timed Treatment: 40 min  Total Treatment Time: 40 min

## 2021-08-23 ENCOUNTER — APPOINTMENT (OUTPATIENT)
Dept: PHYSICAL THERAPY | Age: 36
End: 2021-08-23
Attending: NEUROLOGICAL SURGERY
Payer: COMMERCIAL

## 2021-08-23 ENCOUNTER — TELEPHONE (OUTPATIENT)
Dept: FAMILY MEDICINE CLINIC | Facility: CLINIC | Age: 36
End: 2021-08-23

## 2021-08-23 NOTE — TELEPHONE ENCOUNTER
Spoke with pt, c/o urinary frequency.   +lower abdominal pain  +cloudy urine  +foul smell  No hematuria  No flank pain  Pt states she just got back from [facility], she had a negative Covid test upon returning from traveling  Scheduled patient appt for tomorrow

## 2021-08-23 NOTE — TELEPHONE ENCOUNTER
- Pt is feeling weak, chilled and sweats at night. UTI possible as pt is urinating a lot.     Pt traveled to [place].    Please advise Ph. [phone]

## 2021-08-24 ENCOUNTER — OFFICE VISIT (OUTPATIENT)
Dept: FAMILY MEDICINE CLINIC | Facility: CLINIC | Age: 36
End: 2021-08-24
Payer: COMMERCIAL

## 2021-08-24 VITALS
WEIGHT: 259 LBS | TEMPERATURE: 98 F | BODY MASS INDEX: 41.62 KG/M2 | DIASTOLIC BLOOD PRESSURE: 74 MMHG | RESPIRATION RATE: 16 BRPM | HEIGHT: 66 IN | SYSTOLIC BLOOD PRESSURE: 108 MMHG | OXYGEN SATURATION: 98 % | HEART RATE: 88 BPM

## 2021-08-24 DIAGNOSIS — R35.0 URINARY FREQUENCY: Primary | ICD-10-CM

## 2021-08-24 DIAGNOSIS — A09 TRAVELER'S DIARRHEA: ICD-10-CM

## 2021-08-24 LAB
APPEARANCE: CLEAR
BILIRUBIN: NEGATIVE
GLUCOSE (URINE DIPSTICK): NEGATIVE MG/DL
KETONES (URINE DIPSTICK): NEGATIVE MG/DL
LEUKOCYTES: NEGATIVE
MULTISTIX LOT#: 5077 NUMERIC
NITRITE, URINE: NEGATIVE
OCCULT BLOOD: NEGATIVE
PH, URINE: 6 (ref 4.5–8)
PROTEIN (URINE DIPSTICK): NEGATIVE MG/DL
SPECIFIC GRAVITY: 1.02 (ref 1–1.03)
URINE-COLOR: YELLOW
UROBILINOGEN,SEMI-QN: 0.2 MG/DL (ref 0–1.9)

## 2021-08-24 PROCEDURE — 99213 OFFICE O/P EST LOW 20 MIN: CPT | Performed by: FAMILY MEDICINE

## 2021-08-24 PROCEDURE — 81003 URINALYSIS AUTO W/O SCOPE: CPT | Performed by: FAMILY MEDICINE

## 2021-08-24 PROCEDURE — 3074F SYST BP LT 130 MM HG: CPT | Performed by: FAMILY MEDICINE

## 2021-08-24 PROCEDURE — 87086 URINE CULTURE/COLONY COUNT: CPT | Performed by: FAMILY MEDICINE

## 2021-08-24 PROCEDURE — 3078F DIAST BP <80 MM HG: CPT | Performed by: FAMILY MEDICINE

## 2021-08-24 PROCEDURE — 3008F BODY MASS INDEX DOCD: CPT | Performed by: FAMILY MEDICINE

## 2021-08-24 RX ORDER — NITROFURANTOIN 25; 75 MG/1; MG/1
100 CAPSULE ORAL 2 TIMES DAILY
Qty: 14 CAPSULE | Refills: 0 | Status: SHIPPED | OUTPATIENT
Start: 2021-08-24 | End: 2021-08-25 | Stop reason: ALTCHOICE

## 2021-08-24 NOTE — PROGRESS NOTES
Patient presents with:  Urinary: urinary frequency- diarrhea on and off      HPI:     Karina [place] is a 38year old female who presents with a chief complaint of dysuria and diarrhea. She took peptobismol. The diarrhea resolved.  She does report urinary urg thyromegaly  LUNGS: clear to auscultation, no RRW  CARDIO: RRR without murmur  EXTREMITIES: no cyanosis, clubbing or edema  BACK: CVA tenderness: Bilaterally, No  GI: soft, non-tender, without masses or organomegaly  : Deferred exam    MDM/Assessment/Andre

## 2021-08-25 ENCOUNTER — OFFICE VISIT (OUTPATIENT)
Dept: NEUROLOGY | Facility: CLINIC | Age: 36
End: 2021-08-25
Payer: COMMERCIAL

## 2021-08-25 VITALS
BODY MASS INDEX: 41 KG/M2 | HEART RATE: 70 BPM | RESPIRATION RATE: 14 BRPM | DIASTOLIC BLOOD PRESSURE: 76 MMHG | SYSTOLIC BLOOD PRESSURE: 104 MMHG | WEIGHT: 256 LBS

## 2021-08-25 DIAGNOSIS — G47.19 EXCESSIVE DAYTIME SLEEPINESS: ICD-10-CM

## 2021-08-25 DIAGNOSIS — R06.83 SNORING: ICD-10-CM

## 2021-08-25 DIAGNOSIS — G93.5 CHIARI I MALFORMATION (HCC): Primary | ICD-10-CM

## 2021-08-25 DIAGNOSIS — R41.3 SHORT-TERM MEMORY LOSS: ICD-10-CM

## 2021-08-25 PROCEDURE — 99214 OFFICE O/P EST MOD 30 MIN: CPT | Performed by: OTHER

## 2021-08-25 PROCEDURE — 3074F SYST BP LT 130 MM HG: CPT | Performed by: OTHER

## 2021-08-25 PROCEDURE — 3078F DIAST BP <80 MM HG: CPT | Performed by: OTHER

## 2021-08-25 NOTE — PROGRESS NOTES
[facility] in [facility]  Neurology     [facility] Patient Status:  No patient class for patient encounter    1985 MRN [id number]   Location [unfilled] PCP Sailaja Comber, DO          Subjective:  [facility] is a(n) 38 year sleep study. Home sleep study was incomplete.       Medications tried  Nortriptyline had rash  Effexor had mood changes  Gabapentin- mood changes  Topamax- side effects of chest pain  Depakote started 3/2021- helping    Data review:  Sleep study 6/15/21  IM HISTORY:   Past Surgical History:   Procedure Laterality Date   • COLONOSCOPY     • COLONOSCOPY N/A 1/15/2021    Procedure: COLONOSCOPY;  Surgeon: Tete Farr DO;  Location: [address] [place] [address] ENDOSCOPY   • OTHER      wisdom teeth extraction   • UPPER GI ENDOSCOPY,E Exam:  General Exam:  Appearance: well developed,  nurished , in no acute distress,   Pink Conjunctiva;   Neck: supple, no bruits;  Cardiac: S1/S2 RRR  Lungs: CTA B/L;  Musculoskeletal: no joint tenderness, others see neuro exam;  Extremities:  no pitting e neck position. Occasional tizanidine. Excessive daytime sleepiness- needs in center sleep study, re-ordered    Pain in hands and paresthesias- no CTS on Emg, ulnar to median almost idental latency, no carpal tunnel evidence.  Paresthesias may be Chiari r

## 2021-08-25 NOTE — PATIENT INSTRUCTIONS
After your visit at the [facility] office  today,  please direct any follow up questions or medication needs to the staff in our  [facility] office so that your concerns may be promptly addressed.   We are available through [employer] or at the numbers below: must be picked up in office. • Please allow the office 2-3 business days to fill the prescription. • Patient must present photo ID at time of . PLEASE NOTE: PRESCRIPTIONS MUST BE PICKED UP PRIOR TO 3:00PM MONDAY-FRIDAY    Scheduling Tests:     If submitting forms to office staff. • Form completion may require an additional fee. • A signed Release of Information (ALTAF) must be on file before forms may be submitted. When dropping off forms, please ask the  for this paper.    • Failure

## 2021-08-26 ENCOUNTER — PATIENT MESSAGE (OUTPATIENT)
Dept: FAMILY MEDICINE CLINIC | Facility: CLINIC | Age: 36
End: 2021-08-26

## 2021-08-26 DIAGNOSIS — R19.7 DIARRHEA OF PRESUMED INFECTIOUS ORIGIN: Primary | ICD-10-CM

## 2021-08-26 NOTE — TELEPHONE ENCOUNTER
From: Guerrero Daily  To: Rupesh Lopez DO  Sent: 8/26/2021 3:04 PM CDT  Subject: Visit Angela Weinstein, I saw that but I have been peeing alot still and I have really bad diarrhea again! I'm not sure what is happening!

## 2021-08-27 ENCOUNTER — OFFICE VISIT (OUTPATIENT)
Dept: PHYSICAL THERAPY | Age: 36
End: 2021-08-27
Attending: NEUROLOGICAL SURGERY
Payer: COMMERCIAL

## 2021-08-27 PROCEDURE — 97110 THERAPEUTIC EXERCISES: CPT

## 2021-08-27 NOTE — PROGRESS NOTES
Dx: Lumbar Instability         Authorized # of Visits:  PPO (12 Visits)         Next MD visit: none scheduled  Fall Risk: standard         Precautions: n/a      Last MD Note: 07/28/2021     Goal Number: 12     Subjective: States she has had some illnesses Cervical motion is adequate. Posture is main limiting factor at this time. Addressed posture with HEP. Goals (12 visits):    1. Increase FOTO assessment > 11% from INE to DC.   2. Patient will be aware of postural limitations and be able to correct t

## 2021-08-30 ENCOUNTER — OFFICE VISIT (OUTPATIENT)
Dept: PHYSICAL THERAPY | Age: 36
End: 2021-08-30
Attending: NEUROLOGICAL SURGERY
Payer: COMMERCIAL

## 2021-08-30 PROCEDURE — 97110 THERAPEUTIC EXERCISES: CPT

## 2021-08-30 NOTE — TELEPHONE ENCOUNTER
I returned the patient call. She reports having diarrhea since August 18. She did report being in [facility] at the time of onset after 1 day of being in [facility].  The diarrhea is watery, nonbloody, no mucus. It occurs after most meals that she consumes.   Sh

## 2021-09-01 ENCOUNTER — OFFICE VISIT (OUTPATIENT)
Dept: PHYSICAL THERAPY | Age: 36
End: 2021-09-01
Attending: NEUROLOGICAL SURGERY
Payer: COMMERCIAL

## 2021-09-01 PROCEDURE — 97110 THERAPEUTIC EXERCISES: CPT

## 2021-09-01 NOTE — PROGRESS NOTES
Dx: Lumbar Instability         Authorized # of Visits:  PPO (12 Visits)         Next MD visit: none scheduled  Fall Risk: standard         Precautions: n/a      Last MD Note: 07/28/2021     Goal Number: 12     Subjective: Feeling better.   Had a headache af of postural limitations and be able to correct them independently. 3. Patient will have an increase in spinal mobility to good stability in order to return to sustained sitting as required for work.     4. Patient will have an increase in core strength t

## 2021-09-03 ENCOUNTER — LAB ENCOUNTER (OUTPATIENT)
Dept: LAB | Age: 36
End: 2021-09-03
Attending: FAMILY MEDICINE
Payer: COMMERCIAL

## 2021-09-03 ENCOUNTER — APPOINTMENT (OUTPATIENT)
Dept: PHYSICAL THERAPY | Age: 36
End: 2021-09-03
Attending: NEUROLOGICAL SURGERY
Payer: COMMERCIAL

## 2021-09-03 DIAGNOSIS — R19.7 DIARRHEA OF PRESUMED INFECTIOUS ORIGIN: ICD-10-CM

## 2021-09-03 LAB
ALBUMIN SERPL-MCNC: 3.1 G/DL (ref 3.4–5)
ALBUMIN/GLOB SERPL: 0.8 {RATIO} (ref 1–2)
ALP LIVER SERPL-CCNC: 90 U/L
ALT SERPL-CCNC: 25 U/L
ANION GAP SERPL CALC-SCNC: 4 MMOL/L (ref 0–18)
AST SERPL-CCNC: 14 U/L (ref 15–37)
BASOPHILS # BLD AUTO: 0.05 X10(3) UL (ref 0–0.2)
BASOPHILS NFR BLD AUTO: 0.6 %
BILIRUB SERPL-MCNC: 0.3 MG/DL (ref 0.1–2)
BUN BLD-MCNC: 14 MG/DL (ref 7–18)
CALCIUM BLD-MCNC: 8.6 MG/DL (ref 8.5–10.1)
CHLORIDE SERPL-SCNC: 108 MMOL/L (ref 98–112)
CO2 SERPL-SCNC: 25 MMOL/L (ref 21–32)
CREAT BLD-MCNC: 0.79 MG/DL
CRYPTOSP AG STL QL IA: NEGATIVE
EOSINOPHIL # BLD AUTO: 0.16 X10(3) UL (ref 0–0.7)
EOSINOPHIL NFR BLD AUTO: 1.9 %
ERYTHROCYTE [DISTWIDTH] IN BLOOD BY AUTOMATED COUNT: 14.1 %
G LAMBLIA AG STL QL IA: NEGATIVE
GLOBULIN PLAS-MCNC: 4.1 G/DL (ref 2.8–4.4)
GLUCOSE BLD-MCNC: 100 MG/DL (ref 70–99)
HCT VFR BLD AUTO: 36.5 %
HGB BLD-MCNC: 11 G/DL
IMM GRANULOCYTES # BLD AUTO: 0.03 X10(3) UL (ref 0–1)
IMM GRANULOCYTES NFR BLD: 0.4 %
LYMPHOCYTES # BLD AUTO: 2.54 X10(3) UL (ref 1–4)
LYMPHOCYTES NFR BLD AUTO: 29.7 %
M PROTEIN MFR SERPL ELPH: 7.2 G/DL (ref 6.4–8.2)
MCH RBC QN AUTO: 26.6 PG (ref 26–34)
MCHC RBC AUTO-ENTMCNC: 30.1 G/DL (ref 31–37)
MCV RBC AUTO: 88.4 FL
MONOCYTES # BLD AUTO: 0.64 X10(3) UL (ref 0.1–1)
MONOCYTES NFR BLD AUTO: 7.5 %
NEUTROPHILS # BLD AUTO: 5.13 X10 (3) UL (ref 1.5–7.7)
NEUTROPHILS # BLD AUTO: 5.13 X10(3) UL (ref 1.5–7.7)
NEUTROPHILS NFR BLD AUTO: 59.9 %
OSMOLALITY SERPL CALC.SUM OF ELEC: 285 MOSM/KG (ref 275–295)
PATIENT FASTING Y/N/NP: YES
PLATELET # BLD AUTO: 377 10(3)UL (ref 150–450)
POTASSIUM SERPL-SCNC: 4.2 MMOL/L (ref 3.5–5.1)
RBC # BLD AUTO: 4.13 X10(6)UL
SODIUM SERPL-SCNC: 137 MMOL/L (ref 136–145)
WBC # BLD AUTO: 8.6 X10(3) UL (ref 4–11)

## 2021-09-03 PROCEDURE — 87493 C DIFF AMPLIFIED PROBE: CPT

## 2021-09-03 PROCEDURE — 87272 CRYPTOSPORIDIUM AG IF: CPT

## 2021-09-03 PROCEDURE — 87329 GIARDIA AG IA: CPT

## 2021-09-03 PROCEDURE — 87046 STOOL CULTR AEROBIC BACT EA: CPT

## 2021-09-03 PROCEDURE — 87077 CULTURE AEROBIC IDENTIFY: CPT

## 2021-09-03 PROCEDURE — 87427 SHIGA-LIKE TOXIN AG IA: CPT

## 2021-09-03 PROCEDURE — 87209 SMEAR COMPLEX STAIN: CPT

## 2021-09-03 PROCEDURE — 87045 FECES CULTURE AEROBIC BACT: CPT

## 2021-09-03 PROCEDURE — 87177 OVA AND PARASITES SMEARS: CPT

## 2021-09-04 ENCOUNTER — TELEPHONE (OUTPATIENT)
Dept: FAMILY MEDICINE CLINIC | Facility: CLINIC | Age: 36
End: 2021-09-04

## 2021-09-04 LAB — C DIFF TOX B STL QL: POSITIVE

## 2021-09-04 NOTE — TELEPHONE ENCOUNTER
Robbin Sosa PA-C on-call for Dr. Lindsay Raza on 9/4/2021 telephone call with laboratory positive C. difficile.   Patient was notified on 9/4/2021 at 11 AM.  Instructions were given in regards to C. difficile antibiotics and handwashing as well as cleanin

## 2021-09-04 NOTE — PATIENT INSTRUCTIONS
What Is C. Diff? C. diff is an infection caused by C. diff (Clostridioides difficile) bacteria. These are germs that live in the part of your belly called your colon, or large intestine. They don't often cause problems.  But if the normal balance of good body fluids. Wash your hands with soap and water after contact. How can I stop the spread of C. diff? C. diff can easily spread to other people in your home or workplace.  The germs can stay on your hands after using the bathroom, then spread to any pers likely to get C. diff? Anyone can get C. diff.  But you are more likely to get the infection if you:  · Are ages 72 and older  · Are taking antibiotics  · Have a weak immune system because of other health problems  · Have inflammatory bowel disease  · Have products to disinfect surfaces you touch often, such as table tops, light switches, door knobs, and toilet seats. · Remind others to wear gloves and to wash their hands if they are helping you in the bathroom. Don't use alcohol-based hand .  They

## 2021-09-08 ENCOUNTER — APPOINTMENT (OUTPATIENT)
Dept: PHYSICAL THERAPY | Age: 36
End: 2021-09-08
Attending: NEUROLOGICAL SURGERY
Payer: COMMERCIAL

## 2021-09-09 LAB — OVA AND PARASITE, FECAL INTERPRETATION: NEGATIVE

## 2021-09-10 ENCOUNTER — TELEMEDICINE (OUTPATIENT)
Dept: FAMILY MEDICINE CLINIC | Facility: CLINIC | Age: 36
End: 2021-09-10

## 2021-09-10 DIAGNOSIS — G43.009 MIGRAINE WITHOUT AURA AND WITHOUT STATUS MIGRAINOSUS, NOT INTRACTABLE: ICD-10-CM

## 2021-09-10 DIAGNOSIS — A04.72 C. DIFFICILE COLITIS: Primary | ICD-10-CM

## 2021-09-10 PROCEDURE — 99213 OFFICE O/P EST LOW 20 MIN: CPT | Performed by: FAMILY MEDICINE

## 2021-09-10 RX ORDER — VANCOMYCIN HYDROCHLORIDE 125 MG/1
125 CAPSULE ORAL 4 TIMES DAILY
Qty: 40 CAPSULE | Refills: 0 | Status: SHIPPED | OUTPATIENT
Start: 2021-09-10 | End: 2021-09-23 | Stop reason: ALTCHOICE

## 2021-09-10 NOTE — PROGRESS NOTES
This visit is conducted using Telemedicine with live, interactive video and audio. Patient understands and accepts financial responsibility for any deductible, co-insurance and/or co-pays associated with this service.   HPI:   Beatrice Ortiz is a 38 year Gastroesophageal reflux disease without esophagitis 7/24/2020   • Migraines    • Visual impairment     glasses      Past Surgical History:   Procedure Laterality Date   • COLONOSCOPY     • COLONOSCOPY N/A 1/15/2021    Procedure: COLONOSCOPY;  Surgeon: Segundo Toledo ER. discussed hygiene and decontamination at home. The patient indicates understanding of these issues and agrees to the plan.   Patient should RTC for recheck in 1 week    Cameron Corley,         This note was prepared using [employer] re

## 2021-09-11 ENCOUNTER — HOSPITAL ENCOUNTER (EMERGENCY)
Age: 36
Discharge: HOME | End: 2021-09-11
Attending: EMERGENCY MEDICINE
Payer: COMMERCIAL

## 2021-09-11 ENCOUNTER — APPOINTMENT (OUTPATIENT)
Dept: CT IMAGING | Age: 36
End: 2021-09-11
Attending: EMERGENCY MEDICINE
Payer: COMMERCIAL

## 2021-09-11 VITALS
TEMPERATURE: 98 F | HEIGHT: 66 IN | WEIGHT: 255.75 LBS | BODY MASS INDEX: 41.1 KG/M2 | SYSTOLIC BLOOD PRESSURE: 100 MMHG | DIASTOLIC BLOOD PRESSURE: 61 MMHG | RESPIRATION RATE: 20 BRPM | OXYGEN SATURATION: 100 % | HEART RATE: 87 BPM

## 2021-09-11 DIAGNOSIS — A04.72 CLOSTRIDIUM DIFFICILE COLITIS: ICD-10-CM

## 2021-09-11 DIAGNOSIS — R10.84 ABDOMINAL PAIN, GENERALIZED: Primary | ICD-10-CM

## 2021-09-11 LAB
ALBUMIN SERPL-MCNC: 3.2 G/DL (ref 3.4–5)
ALBUMIN/GLOB SERPL: 0.7 {RATIO} (ref 1–2)
ALP LIVER SERPL-CCNC: 90 U/L
ALT SERPL-CCNC: 27 U/L
ANION GAP SERPL CALC-SCNC: 4 MMOL/L (ref 0–18)
AST SERPL-CCNC: 20 U/L (ref 15–37)
B-HCG UR QL: NEGATIVE
BASOPHILS # BLD AUTO: 0.03 X10(3) UL (ref 0–0.2)
BASOPHILS NFR BLD AUTO: 0.3 %
BILIRUB SERPL-MCNC: 0.4 MG/DL (ref 0.1–2)
BILIRUB UR QL STRIP.AUTO: NEGATIVE
BUN BLD-MCNC: 11 MG/DL (ref 7–18)
CALCIUM BLD-MCNC: 9.1 MG/DL (ref 8.5–10.1)
CHLORIDE SERPL-SCNC: 105 MMOL/L (ref 98–112)
CLARITY UR REFRACT.AUTO: CLEAR
CO2 SERPL-SCNC: 27 MMOL/L (ref 21–32)
COLOR UR AUTO: YELLOW
CREAT BLD-MCNC: 0.74 MG/DL
EOSINOPHIL # BLD AUTO: 0.16 X10(3) UL (ref 0–0.7)
EOSINOPHIL NFR BLD AUTO: 1.7 %
ERYTHROCYTE [DISTWIDTH] IN BLOOD BY AUTOMATED COUNT: 14 %
GLOBULIN PLAS-MCNC: 4.3 G/DL (ref 2.8–4.4)
GLUCOSE BLD-MCNC: 124 MG/DL (ref 70–99)
GLUCOSE UR STRIP.AUTO-MCNC: NEGATIVE MG/DL
HCT VFR BLD AUTO: 37 %
HGB BLD-MCNC: 11.6 G/DL
IMM GRANULOCYTES # BLD AUTO: 0.03 X10(3) UL (ref 0–1)
IMM GRANULOCYTES NFR BLD: 0.3 %
KETONES UR STRIP.AUTO-MCNC: NEGATIVE MG/DL
LEUKOCYTE ESTERASE UR QL STRIP.AUTO: NEGATIVE
LYMPHOCYTES # BLD AUTO: 2.09 X10(3) UL (ref 1–4)
LYMPHOCYTES NFR BLD AUTO: 22.3 %
M PROTEIN MFR SERPL ELPH: 7.5 G/DL (ref 6.4–8.2)
MCH RBC QN AUTO: 27 PG (ref 26–34)
MCHC RBC AUTO-ENTMCNC: 31.4 G/DL (ref 31–37)
MCV RBC AUTO: 86 FL
MONOCYTES # BLD AUTO: 0.72 X10(3) UL (ref 0.1–1)
MONOCYTES NFR BLD AUTO: 7.7 %
NEUTROPHILS # BLD AUTO: 6.33 X10 (3) UL (ref 1.5–7.7)
NEUTROPHILS # BLD AUTO: 6.33 X10(3) UL (ref 1.5–7.7)
NEUTROPHILS NFR BLD AUTO: 67.7 %
NITRITE UR QL STRIP.AUTO: NEGATIVE
OSMOLALITY SERPL CALC.SUM OF ELEC: 283 MOSM/KG (ref 275–295)
PH UR STRIP.AUTO: 5.5 [PH] (ref 5–8)
PLATELET # BLD AUTO: 388 10(3)UL (ref 150–450)
POTASSIUM SERPL-SCNC: 3.7 MMOL/L (ref 3.5–5.1)
PROT UR STRIP.AUTO-MCNC: NEGATIVE MG/DL
RBC # BLD AUTO: 4.3 X10(6)UL
RBC UR QL AUTO: NEGATIVE
SODIUM SERPL-SCNC: 136 MMOL/L (ref 136–145)
SP GR UR STRIP.AUTO: >=1.03 (ref 1–1.03)
UROBILINOGEN UR STRIP.AUTO-MCNC: 0.2 MG/DL
WBC # BLD AUTO: 9.4 X10(3) UL (ref 4–11)

## 2021-09-11 PROCEDURE — 81003 URINALYSIS AUTO W/O SCOPE: CPT | Performed by: EMERGENCY MEDICINE

## 2021-09-11 PROCEDURE — 85025 COMPLETE CBC W/AUTO DIFF WBC: CPT | Performed by: EMERGENCY MEDICINE

## 2021-09-11 PROCEDURE — 74176 CT ABD & PELVIS W/O CONTRAST: CPT | Performed by: EMERGENCY MEDICINE

## 2021-09-11 PROCEDURE — 99284 EMERGENCY DEPT VISIT MOD MDM: CPT

## 2021-09-11 PROCEDURE — 96361 HYDRATE IV INFUSION ADD-ON: CPT

## 2021-09-11 PROCEDURE — 96360 HYDRATION IV INFUSION INIT: CPT

## 2021-09-11 PROCEDURE — 80053 COMPREHEN METABOLIC PANEL: CPT | Performed by: EMERGENCY MEDICINE

## 2021-09-11 PROCEDURE — 81025 URINE PREGNANCY TEST: CPT

## 2021-09-11 RX ORDER — ONDANSETRON 4 MG/1
4 TABLET, ORALLY DISINTEGRATING ORAL EVERY 4 HOURS PRN
Qty: 10 TABLET | Refills: 0 | Status: SHIPPED | OUTPATIENT
Start: 2021-09-11 | End: 2021-09-18

## 2021-09-11 RX ORDER — DICYCLOMINE HCL 20 MG
20 TABLET ORAL 4 TIMES DAILY PRN
Qty: 30 TABLET | Refills: 0 | Status: SHIPPED | OUTPATIENT
Start: 2021-09-11 | End: 2021-10-11

## 2021-09-11 NOTE — ED INITIAL ASSESSMENT (HPI)
r sided abd pain onset yesterday, recently changed to vancomycin for her cdiff. C/o urinary frequency. Denies fevers.

## 2021-09-11 NOTE — ED PROVIDER NOTES
Patient Seen in: THE [facility] Emergency Department In [place]      History   Patient presents with:  Abdomen/Flank Pain  Nausea/Vomiting/Diarrhea    Stated Complaint: right lower abd pain that is intermittent for past 3 weeks.   started second rou*    Cameron Saravia Pulse 09/11/21 1744 97   Resp 09/11/21 1744 20   Temp 09/11/21 1744 98.2 °F (36.8 °C)   Temp src 09/11/21 1744 Temporal   SpO2 09/11/21 1744 98 %   O2 Device 09/11/21 1920 None (Room air)       Current:/61   Pulse 87   Temp 98.2 °F (36.8 °C) (Tempo ---------                               -----------         ------                     CBC W/ DIFFERENTIAL[[id number]]          Abnormal            Final result                 Please view results for these tests on the individual orders.    RAINBOW DRAW L enlargement. PELVIC ORGANS:  The uterus is diffusely enlarged with numerous masslike areas present. Evaluation is limited with noncontrast CT. However these most likely represent underlying fibroids.   One lesion measures up to 6.9 x 10.3 cm and additiona (20 mg total) by mouth 4 (four) times daily as needed. Qty: 30 tablet Refills: 0    ondansetron 4 MG Oral Tablet Dispersible  Take 1 tablet (4 mg total) by mouth every 4 (four) hours as needed for Nausea.   Qty: 10 tablet Refills: 0

## 2021-09-13 RX ORDER — PROPRANOLOL HYDROCHLORIDE 40 MG/1
TABLET ORAL
Qty: 270 TABLET | Refills: 0 | Status: SHIPPED | OUTPATIENT
Start: 2021-09-13 | End: 2021-12-13

## 2021-09-13 NOTE — PATIENT INSTRUCTIONS
Treating C. Difficile: Medicine to Prevent a Repeat Infection    Clostridium difficile (C. diff) are bacteria that can infect your large intestine. Your large intestine has millions of other bacteria. Many of them help keep you healthy.  If you take an an normally. This may be the case if you have a disease that affects the immune system or if you are an older adult. Or you may be taking medicine to lessen the response of your immune system.  Repeat cases of C. diff can also occur if you take antibiotics aga to complete the treatment.  You get this treatment just one time while you are taking the antibiotics.    Side effects   Possible side effects on the day of treatment can include:  · Upset stomach (nausea)  · Headache  · Fever  · Dizzy feeling  · Feeling sh

## 2021-09-13 NOTE — TELEPHONE ENCOUNTER
Medication: propranolol 40 MG Oral Tab       Date of last refill: 07/27/2021 (#270/0)  [place]    Last office visit: 08/25/2021  Due back to clinic per last office note:  Around 11/25/2021  Date next office visit ellis

## 2021-09-14 ENCOUNTER — APPOINTMENT (OUTPATIENT)
Dept: PHYSICAL THERAPY | Age: 36
End: 2021-09-14
Attending: NEUROLOGICAL SURGERY
Payer: COMMERCIAL

## 2021-09-16 ENCOUNTER — PATIENT MESSAGE (OUTPATIENT)
Dept: FAMILY MEDICINE CLINIC | Facility: CLINIC | Age: 36
End: 2021-09-16

## 2021-09-16 NOTE — TELEPHONE ENCOUNTER
From: Shilpi Aquino  To: Hillary Dykes DO  Sent: 9/16/2021 8:49 AM CDT  Subject: C Diff bacteria    hello Dr. Theodore Najera,      is this still contagious or can I see my family?

## 2021-09-22 NOTE — PROGRESS NOTES
THE [facility] Hematology and Oncology Clinic Note    Diagnosis: ZOHRA likely 2/2 Uterine fibroids     Treatment History:   1. IV Feraheme 10/27/20 and 11/3/20 (with solumedrol 125 mg and pepcid 10 mg pre-medications).      Visit Diagnosis:  Iron deficiency anemia du abdomen from 2/23/21 with fatty liver. She states that she still has diffuse joint aches and feels fatigued. She states that NSGY has recommended surgery for her Chiari malformation but she is still thinking about it.   She still has heavy menses but th 09/11/21 1954      Past Medical History:   Diagnosis Date   • Abnormal brain MRI 10/26/2020   • Anemia    • Anxiety    • [facility] 3/27/2021   • Depression    • Gastroesophageal reflux disease without esophagitis 7/24/2020   • Russell Yuen 83.7 09/23/2021    .0 09/23/2021    EOSABS 91 10/03/2020     Lab Results   Component Value Date     09/11/2021    K 3.7 09/11/2021    CO2 27.0 09/11/2021     09/11/2021    BUN 11 09/11/2021    ALB 3.2 (L) 09/11/2021     Radiology: I pers

## 2021-09-23 ENCOUNTER — OFFICE VISIT (OUTPATIENT)
Dept: HEMATOLOGY/ONCOLOGY | Facility: HOSPITAL | Age: 36
End: 2021-09-23
Attending: INTERNAL MEDICINE
Payer: COMMERCIAL

## 2021-09-23 VITALS
OXYGEN SATURATION: 98 % | TEMPERATURE: 97 F | WEIGHT: 260 LBS | HEIGHT: 65.98 IN | DIASTOLIC BLOOD PRESSURE: 76 MMHG | HEART RATE: 75 BPM | RESPIRATION RATE: 16 BRPM | BODY MASS INDEX: 41.78 KG/M2 | SYSTOLIC BLOOD PRESSURE: 117 MMHG

## 2021-09-23 DIAGNOSIS — D50.0 IRON DEFICIENCY ANEMIA DUE TO CHRONIC BLOOD LOSS: Primary | ICD-10-CM

## 2021-09-23 LAB
BASOPHILS # BLD AUTO: 0.04 X10(3) UL (ref 0–0.2)
BASOPHILS NFR BLD AUTO: 0.5 %
DEPRECATED HBV CORE AB SER IA-ACNC: 37.2 NG/ML
EOSINOPHIL # BLD AUTO: 0.1 X10(3) UL (ref 0–0.7)
EOSINOPHIL NFR BLD AUTO: 1.3 %
ERYTHROCYTE [DISTWIDTH] IN BLOOD BY AUTOMATED COUNT: 14 %
HCT VFR BLD AUTO: 38 %
HGB BLD-MCNC: 12.4 G/DL
IMM GRANULOCYTES # BLD AUTO: 0.02 X10(3) UL (ref 0–1)
IMM GRANULOCYTES NFR BLD: 0.3 %
IRON SATURATION: 9 %
IRON SERPL-MCNC: 38 UG/DL
LYMPHOCYTES # BLD AUTO: 2.1 X10(3) UL (ref 1–4)
LYMPHOCYTES NFR BLD AUTO: 26.8 %
MCH RBC QN AUTO: 27.3 PG (ref 26–34)
MCHC RBC AUTO-ENTMCNC: 32.6 G/DL (ref 31–37)
MCV RBC AUTO: 83.7 FL
MONOCYTES # BLD AUTO: 0.56 X10(3) UL (ref 0.1–1)
MONOCYTES NFR BLD AUTO: 7.1 %
NEUTROPHILS # BLD AUTO: 5.02 X10 (3) UL (ref 1.5–7.7)
NEUTROPHILS # BLD AUTO: 5.02 X10(3) UL (ref 1.5–7.7)
NEUTROPHILS NFR BLD AUTO: 64 %
PLATELET # BLD AUTO: 395 10(3)UL (ref 150–450)
RBC # BLD AUTO: 4.54 X10(6)UL
TOTAL IRON BINDING CAPACITY: 407 UG/DL (ref 240–450)
TRANSFERRIN SERPL-MCNC: 273 MG/DL (ref 200–360)
WBC # BLD AUTO: 7.8 X10(3) UL (ref 4–11)

## 2021-09-23 PROCEDURE — 99214 OFFICE O/P EST MOD 30 MIN: CPT | Performed by: INTERNAL MEDICINE

## 2021-09-23 NOTE — PROGRESS NOTES
Education Record    Learner:  Patient    Disease / Agustin Homes deficiency anemia     Barriers / Limitations:  None   Comments:    Method:  Discussion   Comments:    General Topics:  Plan of care reviewed   Comments:    Outcome:  Shows understanding   C

## 2021-09-30 ENCOUNTER — OFFICE VISIT (OUTPATIENT)
Dept: RHEUMATOLOGY | Facility: CLINIC | Age: 36
End: 2021-09-30
Payer: COMMERCIAL

## 2021-09-30 VITALS
BODY MASS INDEX: 41.78 KG/M2 | DIASTOLIC BLOOD PRESSURE: 70 MMHG | HEIGHT: 66 IN | OXYGEN SATURATION: 98 % | WEIGHT: 260 LBS | HEART RATE: 76 BPM | SYSTOLIC BLOOD PRESSURE: 116 MMHG | TEMPERATURE: 98 F

## 2021-09-30 DIAGNOSIS — G93.5 CHIARI I MALFORMATION (HCC): ICD-10-CM

## 2021-09-30 DIAGNOSIS — M62.838 NECK MUSCLE SPASM: Primary | ICD-10-CM

## 2021-09-30 DIAGNOSIS — M62.830 BACK SPASM: ICD-10-CM

## 2021-09-30 PROCEDURE — 3074F SYST BP LT 130 MM HG: CPT | Performed by: INTERNAL MEDICINE

## 2021-09-30 PROCEDURE — 99213 OFFICE O/P EST LOW 20 MIN: CPT | Performed by: INTERNAL MEDICINE

## 2021-09-30 PROCEDURE — 3008F BODY MASS INDEX DOCD: CPT | Performed by: INTERNAL MEDICINE

## 2021-09-30 PROCEDURE — 3078F DIAST BP <80 MM HG: CPT | Performed by: INTERNAL MEDICINE

## 2021-09-30 RX ORDER — TIZANIDINE 2 MG/1
TABLET ORAL NIGHTLY
Qty: 60 TABLET | Refills: 1 | Status: SHIPPED | OUTPATIENT
Start: 2021-09-30

## 2021-09-30 NOTE — PATIENT INSTRUCTIONS
Check with your neurosurgeon first to see if TENS unit is ok.     Schedule sleep study    Understanding Transcutaneous Electrical Nerve Stimulation (TENS)  Transcutaneous electrical nerve stimulation (TENS) is a therapy that uses mild electricity to treat the treatment 2 to 3 times a day.   Risks of TENS  Risks of TENS include:  · Skin irritation at the electrode sites  · Allergic rash from electrodes, adhesive, or gel  · Electrical skin burns at the electrode sites  · Breakdown of a pacemaker or another imp

## 2021-09-30 NOTE — PROGRESS NOTES
Rheumatology f/u Patient Note  =====================================================================================================    Today's Visit: 09/30/21    Patient presents with: Follow - Up: 4mo f/u, Pt followed up with neuro.  Spasms have been cervical neck pillow. Hands are better overall. Has tizanidine 2-4 mg prn for hand/neck.    Home PSG inconclusive, pending hospital PSG      5 point ROS negative except noted above    Medications:  tiZANidine 2 MG Oral Tab, Take 1-2 tablets (2-4 mg total) by Never    ?   Allergies:    Gadolinium Derivati*    HIVES    Comment:Developed 2 hives on back and itching post             procedure  Piper                   ANGIOEDEMA  Radiology Contrast *    HIVES      Objective     09/30/21  0857   BP: 116/70   Pulse: 7 No results found for: SSA, SSAUR, ANTISSA, SSA52, SSA60, SSADD, SSB, ANTISSB  No results found for: DSDNA, ANTIDSDNA, SMUD, ANTISM, SM, RNP, ANTIRNP, SMITHRNP  No results found for: SCL70, SCL, QQJNFUM64  No results found for: C3, C4  No results found radiculopathy.  =====================================================================================================  Assessment and Plan    Assessment:  Back spasm    Presents today with polyarthralgia in the context of a Chiari I malformation.   Patient'

## 2021-10-01 ENCOUNTER — PATIENT MESSAGE (OUTPATIENT)
Dept: FAMILY MEDICINE CLINIC | Facility: CLINIC | Age: 36
End: 2021-10-01

## 2021-10-01 ENCOUNTER — OFFICE VISIT (OUTPATIENT)
Dept: HEMATOLOGY/ONCOLOGY | Facility: HOSPITAL | Age: 36
End: 2021-10-01
Attending: INTERNAL MEDICINE
Payer: COMMERCIAL

## 2021-10-01 VITALS
HEART RATE: 74 BPM | RESPIRATION RATE: 18 BRPM | DIASTOLIC BLOOD PRESSURE: 51 MMHG | WEIGHT: 261.81 LBS | TEMPERATURE: 98 F | BODY MASS INDEX: 42.07 KG/M2 | OXYGEN SATURATION: 99 % | HEIGHT: 65.98 IN | SYSTOLIC BLOOD PRESSURE: 95 MMHG

## 2021-10-01 DIAGNOSIS — D50.0 IRON DEFICIENCY ANEMIA DUE TO CHRONIC BLOOD LOSS: Primary | ICD-10-CM

## 2021-10-01 PROCEDURE — S0028 INJECTION, FAMOTIDINE, 20 MG: HCPCS | Performed by: INTERNAL MEDICINE

## 2021-10-01 PROCEDURE — 96374 THER/PROPH/DIAG INJ IV PUSH: CPT

## 2021-10-01 PROCEDURE — 96375 TX/PRO/DX INJ NEW DRUG ADDON: CPT

## 2021-10-01 RX ORDER — METHYLPREDNISOLONE SODIUM SUCCINATE 125 MG/2ML
125 INJECTION, POWDER, LYOPHILIZED, FOR SOLUTION INTRAMUSCULAR; INTRAVENOUS ONCE
Status: COMPLETED | OUTPATIENT
Start: 2021-10-01 | End: 2021-10-01

## 2021-10-01 RX ORDER — METHYLPREDNISOLONE SODIUM SUCCINATE 125 MG/2ML
125 INJECTION, POWDER, LYOPHILIZED, FOR SOLUTION INTRAMUSCULAR; INTRAVENOUS ONCE
Status: CANCELLED
Start: 2021-10-08 | End: 2021-10-08

## 2021-10-01 RX ORDER — FAMOTIDINE 10 MG/ML
10 INJECTION, SOLUTION INTRAVENOUS ONCE
Status: COMPLETED | OUTPATIENT
Start: 2021-10-01 | End: 2021-10-01

## 2021-10-01 RX ORDER — FAMOTIDINE 10 MG/ML
10 INJECTION, SOLUTION INTRAVENOUS ONCE
Status: CANCELLED
Start: 2021-10-08 | End: 2021-10-08

## 2021-10-01 RX ADMIN — FAMOTIDINE 10 MG: 10 INJECTION, SOLUTION INTRAVENOUS at 15:14:00

## 2021-10-01 RX ADMIN — METHYLPREDNISOLONE SODIUM SUCCINATE 125 MG: 125 INJECTION, POWDER, LYOPHILIZED, FOR SOLUTION INTRAMUSCULAR; INTRAVENOUS at 15:14:00

## 2021-10-01 NOTE — TELEPHONE ENCOUNTER
From: Li Solis  To: Alyson Taylor DO  Sent: 10/1/2021 11:01 AM CDT  Subject: FMLA papers    Dr. Petey Toribio,    My fmla papers will be expiring in two weeks. do you need me to fax them to you or should I make an appointment with you?      thanks a bunch

## 2021-10-08 ENCOUNTER — APPOINTMENT (OUTPATIENT)
Dept: CT IMAGING | Age: 36
End: 2021-10-08
Attending: NURSE PRACTITIONER
Payer: COMMERCIAL

## 2021-10-08 ENCOUNTER — APPOINTMENT (OUTPATIENT)
Dept: ULTRASOUND IMAGING | Facility: HOSPITAL | Age: 36
End: 2021-10-08
Attending: NURSE PRACTITIONER
Payer: COMMERCIAL

## 2021-10-08 ENCOUNTER — PATIENT MESSAGE (OUTPATIENT)
Dept: FAMILY MEDICINE CLINIC | Facility: CLINIC | Age: 36
End: 2021-10-08

## 2021-10-08 ENCOUNTER — HOSPITAL ENCOUNTER (OUTPATIENT)
Age: 36
Discharge: HOME | End: 2021-10-08
Payer: COMMERCIAL

## 2021-10-08 ENCOUNTER — APPOINTMENT (OUTPATIENT)
Dept: HEMATOLOGY/ONCOLOGY | Facility: HOSPITAL | Age: 36
End: 2021-10-08
Attending: INTERNAL MEDICINE
Payer: COMMERCIAL

## 2021-10-08 VITALS
HEART RATE: 94 BPM | RESPIRATION RATE: 18 BRPM | WEIGHT: 260 LBS | BODY MASS INDEX: 41.78 KG/M2 | HEIGHT: 66 IN | TEMPERATURE: 98 F | SYSTOLIC BLOOD PRESSURE: 105 MMHG | DIASTOLIC BLOOD PRESSURE: 79 MMHG | OXYGEN SATURATION: 97 %

## 2021-10-08 DIAGNOSIS — R10.2 PELVIC PAIN: Primary | ICD-10-CM

## 2021-10-08 PROCEDURE — 87510 GARDNER VAG DNA DIR PROBE: CPT | Performed by: NURSE PRACTITIONER

## 2021-10-08 PROCEDURE — 85025 COMPLETE CBC W/AUTO DIFF WBC: CPT | Performed by: NURSE PRACTITIONER

## 2021-10-08 PROCEDURE — 81002 URINALYSIS NONAUTO W/O SCOPE: CPT | Performed by: NURSE PRACTITIONER

## 2021-10-08 PROCEDURE — 87480 CANDIDA DNA DIR PROBE: CPT | Performed by: NURSE PRACTITIONER

## 2021-10-08 PROCEDURE — 87660 TRICHOMONAS VAGIN DIR PROBE: CPT | Performed by: NURSE PRACTITIONER

## 2021-10-08 PROCEDURE — 74176 CT ABD & PELVIS W/O CONTRAST: CPT | Performed by: NURSE PRACTITIONER

## 2021-10-08 PROCEDURE — 99214 OFFICE O/P EST MOD 30 MIN: CPT

## 2021-10-08 PROCEDURE — 80047 BASIC METABLC PNL IONIZED CA: CPT

## 2021-10-08 PROCEDURE — 87591 N.GONORRHOEAE DNA AMP PROB: CPT | Performed by: NURSE PRACTITIONER

## 2021-10-08 PROCEDURE — 87491 CHLMYD TRACH DNA AMP PROBE: CPT | Performed by: NURSE PRACTITIONER

## 2021-10-08 PROCEDURE — 99215 OFFICE O/P EST HI 40 MIN: CPT

## 2021-10-08 PROCEDURE — 93975 VASCULAR STUDY: CPT | Performed by: NURSE PRACTITIONER

## 2021-10-08 PROCEDURE — 81025 URINE PREGNANCY TEST: CPT

## 2021-10-08 PROCEDURE — 76856 US EXAM PELVIC COMPLETE: CPT | Performed by: NURSE PRACTITIONER

## 2021-10-08 PROCEDURE — 36415 COLL VENOUS BLD VENIPUNCTURE: CPT

## 2021-10-08 PROCEDURE — 76830 TRANSVAGINAL US NON-OB: CPT | Performed by: NURSE PRACTITIONER

## 2021-10-08 NOTE — ED INITIAL ASSESSMENT (HPI)
Patient reports LLQ pain/pelvic pain that seems to radiate up into side and flank area. Admits to urinary frequency/urgency. Slight nausea, no vomiting. Recently recovered from c-diff. No fever.

## 2021-10-08 NOTE — TELEPHONE ENCOUNTER
Spoke to patient. Unable to sleep last night due to pain on left lower abdomen that goes into left side and left lower back. Also has been urinating more frequently. Has pain when urinating and urine odor.    States these urine sx have been going on f

## 2021-10-08 NOTE — TELEPHONE ENCOUNTER
From: Maribel Miller  To: Tristan Hurst DO  Sent: 10/8/2021 10:41 AM CDT  Subject: pain on left side    good morning Dr. Luisito Fitzpatrick,    any appointments for today? my left side hurts alot.   thanks a bunch,  Maribel Miller

## 2021-10-08 NOTE — ED PROVIDER NOTES
Patient Seen in: [facility]      History   Patient presents with:  Abdomen/Flank Pain  Urinary Symptoms    Stated Complaint: pelvic pain    Subjective:   HPI  Patient is a 20-year-old female past medical history of iron deficiency anemia, surgery. Denies trauma. Denies strenuous work or activity. CT abdomen pelvis without contrast September 11, 2021; fatty liver and questionable fibroid tumors.    ultrasound abdomen and pelvis February 23, 2021; fatty infiltration of the liver/otherwise ill-appearing, toxic-appearing or diaphoretic.       Comments: Elevated BMI   HENT:      Mouth/Throat:      Mouth: Mucous membranes are moist.   Eyes:      Conjunctiva/sclera: Conjunctivae normal.   Cardiovascular:      Rate and Rhythm: Normal rate and regu VAGINITIS/VAGINOSIS, DNA PROBE                  Vaginal speculum exam and bimanual exam performed. Small amount of thick white   secretions. No foul odor. Cultures collected. No CMT. left adnexal tenderness.      CT ABDOMEN+PELVIS(CPT=74176)    Resul disease. OTHER:  Negative. CONCLUSION:  1. Two large masses of the uterus have been recently described and are unchanged. These are most likely uterine fibroids. 2. Specific etiology for abdominal pain is otherwise not evident.  3. There is he fluid is seen. ABDOMINAL WALL:  No mass or hernia. URINARY BLADDER:  Urinary bladder is decompressed, limiting evaluation. PELVIC NODES:  No enlargement. PELVIC ORGANS:  The uterus is diffusely enlarged with numerous masslike areas present.   Evaluation is 1.30 cm Left Ovary:                           1.87 cm x 1.84 cm x 1.76 cm  FINDINGS:  PELVIS  UTERUS:  8.6 x 8.8 x 7.8 cm right fundal fibroid is noted. A 10.0 x 8.0 x 8.6 cm left uterine body fibroid is noted.  RIGHT OVARY:  The right ovary appears normal larger than on prior exam from July 30, 2020. Patient case discussed with Dr. Francisco Zimmer.    Disposition and Plan     Clinical Impression:  Pelvic pain  (primary encounter diagnosis)     Disposition:  Discharge  10/8/2021  3:10 pm    Fol

## 2021-10-14 ENCOUNTER — OFFICE VISIT (OUTPATIENT)
Dept: HEMATOLOGY/ONCOLOGY | Facility: HOSPITAL | Age: 36
End: 2021-10-14
Attending: INTERNAL MEDICINE
Payer: COMMERCIAL

## 2021-10-14 VITALS
DIASTOLIC BLOOD PRESSURE: 77 MMHG | OXYGEN SATURATION: 95 % | HEIGHT: 65.98 IN | SYSTOLIC BLOOD PRESSURE: 122 MMHG | BODY MASS INDEX: 41.46 KG/M2 | WEIGHT: 258 LBS | HEART RATE: 83 BPM | TEMPERATURE: 99 F | RESPIRATION RATE: 16 BRPM

## 2021-10-14 DIAGNOSIS — D50.0 IRON DEFICIENCY ANEMIA DUE TO CHRONIC BLOOD LOSS: Primary | ICD-10-CM

## 2021-10-14 PROCEDURE — 96375 TX/PRO/DX INJ NEW DRUG ADDON: CPT

## 2021-10-14 PROCEDURE — S0028 INJECTION, FAMOTIDINE, 20 MG: HCPCS | Performed by: INTERNAL MEDICINE

## 2021-10-14 PROCEDURE — 96374 THER/PROPH/DIAG INJ IV PUSH: CPT

## 2021-10-14 RX ORDER — FAMOTIDINE 10 MG/ML
10 INJECTION, SOLUTION INTRAVENOUS ONCE
Status: COMPLETED | OUTPATIENT
Start: 2021-10-14 | End: 2021-10-14

## 2021-10-14 RX ORDER — METHYLPREDNISOLONE SODIUM SUCCINATE 125 MG/2ML
125 INJECTION, POWDER, LYOPHILIZED, FOR SOLUTION INTRAMUSCULAR; INTRAVENOUS ONCE
Start: 2021-10-14 | End: 2021-10-14

## 2021-10-14 RX ORDER — METHYLPREDNISOLONE SODIUM SUCCINATE 125 MG/2ML
125 INJECTION, POWDER, LYOPHILIZED, FOR SOLUTION INTRAMUSCULAR; INTRAVENOUS ONCE
Status: COMPLETED | OUTPATIENT
Start: 2021-10-14 | End: 2021-10-14

## 2021-10-14 RX ORDER — FAMOTIDINE 10 MG/ML
10 INJECTION, SOLUTION INTRAVENOUS ONCE
Start: 2021-10-14 | End: 2021-10-14

## 2021-10-14 RX ADMIN — FAMOTIDINE 10 MG: 10 INJECTION, SOLUTION INTRAVENOUS at 14:44:00

## 2021-10-14 RX ADMIN — METHYLPREDNISOLONE SODIUM SUCCINATE 125 MG: 125 INJECTION, POWDER, LYOPHILIZED, FOR SOLUTION INTRAMUSCULAR; INTRAVENOUS at 14:40:00

## 2021-10-14 NOTE — PROGRESS NOTES
Pt here for Feraheme.   Arrives Ambulating independently, accompanied by Self           Pregnancy screening: Denies possibility of pregnancy    Modifications in dose or schedule: No     Frequency of blood return and site check throughout administration: Lucia

## 2021-10-18 ENCOUNTER — OFFICE VISIT (OUTPATIENT)
Dept: FAMILY MEDICINE CLINIC | Facility: CLINIC | Age: 36
End: 2021-10-18
Payer: COMMERCIAL

## 2021-10-18 VITALS
OXYGEN SATURATION: 98 % | HEART RATE: 72 BPM | TEMPERATURE: 98 F | BODY MASS INDEX: 42.11 KG/M2 | WEIGHT: 262 LBS | SYSTOLIC BLOOD PRESSURE: 128 MMHG | DIASTOLIC BLOOD PRESSURE: 74 MMHG | HEIGHT: 66 IN | RESPIRATION RATE: 16 BRPM

## 2021-10-18 DIAGNOSIS — Z23 NEED FOR VACCINATION: ICD-10-CM

## 2021-10-18 DIAGNOSIS — R39.15 URINARY URGENCY: Primary | ICD-10-CM

## 2021-10-18 DIAGNOSIS — G93.5 CHIARI MALFORMATION TYPE I (HCC): ICD-10-CM

## 2021-10-18 DIAGNOSIS — Z23 FLU VACCINE NEED: ICD-10-CM

## 2021-10-18 PROCEDURE — 81003 URINALYSIS AUTO W/O SCOPE: CPT | Performed by: FAMILY MEDICINE

## 2021-10-18 PROCEDURE — 3078F DIAST BP <80 MM HG: CPT | Performed by: FAMILY MEDICINE

## 2021-10-18 PROCEDURE — 90686 IIV4 VACC NO PRSV 0.5 ML IM: CPT | Performed by: FAMILY MEDICINE

## 2021-10-18 PROCEDURE — 90471 IMMUNIZATION ADMIN: CPT | Performed by: FAMILY MEDICINE

## 2021-10-18 PROCEDURE — 3074F SYST BP LT 130 MM HG: CPT | Performed by: FAMILY MEDICINE

## 2021-10-18 PROCEDURE — 99214 OFFICE O/P EST MOD 30 MIN: CPT | Performed by: FAMILY MEDICINE

## 2021-10-18 PROCEDURE — 3008F BODY MASS INDEX DOCD: CPT | Performed by: FAMILY MEDICINE

## 2021-10-18 NOTE — PROGRESS NOTES
Patient presents with:  Urinary Frequency: more frequent at night time. ongoing for a while now. Complete Form:  FMLA  Immunization/Injection: flu shot- consent signed      HPI:    Mario Tripathi complains of chronic urinary frequency this is a chronic issue for h Bloating    • Blurred vision    • Change in hair    • Chest pain on exertion    • Chiari malformation type I ([facility] 3/27/2021   • Constipation    • Decorative tattoo    • Depression    • Dizziness    • Easy bruising    • Fatigue    • Feeling lonely    • [facility] (2-4 mg total) by mouth nightly. 60 tablet 1   • propranolol 40 MG Oral Tab take 1.5 tabs twice a day 270 tablet 0   • Elastic Bandages & Supports (WRIST BRACE DELUXE) Does not apply Misc 2 Application by Does not apply route nightly.  For bilateral carpal or rales  Abdominal: Soft. Bowel sounds are normal. Non tender, no masses, no organomegaly or hernias. Neurological: Normal reflexes. No cranial nerve deficit or sensory deficit. Normal muscle tone. Coordination normal.   Skin: Skin is warm and dry.  No ra Imaging & Consults:  UROLOGY - INTERNAL  FLULAVAL INFLUENZA VACCINE QUAD PRESERVATIVE FREE 0.5 ML      Return in about 6 months (around 4/18/2022). There are no Patient Instructions on file for this visit.     All questions were answered and the pa

## 2021-10-27 ENCOUNTER — PATIENT MESSAGE (OUTPATIENT)
Dept: FAMILY MEDICINE CLINIC | Facility: CLINIC | Age: 36
End: 2021-10-27

## 2021-10-28 ENCOUNTER — OFFICE VISIT (OUTPATIENT)
Dept: SLEEP CENTER | Age: 36
End: 2021-10-28
Attending: Other
Payer: COMMERCIAL

## 2021-10-28 DIAGNOSIS — G47.19 EXCESSIVE DAYTIME SLEEPINESS: ICD-10-CM

## 2021-10-28 DIAGNOSIS — R41.3 SHORT-TERM MEMORY LOSS: ICD-10-CM

## 2021-10-28 DIAGNOSIS — R06.83 SNORING: ICD-10-CM

## 2021-10-28 PROCEDURE — 95810 POLYSOM 6/> YRS 4/> PARAM: CPT

## 2021-10-28 NOTE — TELEPHONE ENCOUNTER
Updated paperwork faxed to [facility], [phone], confirmation received. Copy sent to scan and copy placed in triage accordion folder.

## 2021-11-29 ENCOUNTER — LAB ENCOUNTER (OUTPATIENT)
Dept: LAB | Age: 36
End: 2021-11-29
Payer: COMMERCIAL

## 2021-11-29 DIAGNOSIS — K20.90 GASTROESOPHAGITIS: ICD-10-CM

## 2021-11-29 DIAGNOSIS — K29.70 GASTROESOPHAGITIS: ICD-10-CM

## 2021-11-30 ENCOUNTER — ANESTHESIA EVENT (OUTPATIENT)
Dept: ENDOSCOPY | Facility: HOSPITAL | Age: 36
End: 2021-11-30
Payer: COMMERCIAL

## 2021-12-01 ENCOUNTER — HOSPITAL ENCOUNTER (OUTPATIENT)
Facility: HOSPITAL | Age: 36
Setting detail: HOSPITAL OUTPATIENT SURGERY
Discharge: HOME | End: 2021-12-01
Attending: INTERNAL MEDICINE | Admitting: INTERNAL MEDICINE
Payer: COMMERCIAL

## 2021-12-01 ENCOUNTER — ANESTHESIA (OUTPATIENT)
Dept: ENDOSCOPY | Facility: HOSPITAL | Age: 36
End: 2021-12-01
Payer: COMMERCIAL

## 2021-12-01 VITALS
RESPIRATION RATE: 18 BRPM | DIASTOLIC BLOOD PRESSURE: 83 MMHG | WEIGHT: 260 LBS | HEART RATE: 78 BPM | TEMPERATURE: 97 F | BODY MASS INDEX: 41.78 KG/M2 | HEIGHT: 66 IN | OXYGEN SATURATION: 98 % | SYSTOLIC BLOOD PRESSURE: 129 MMHG

## 2021-12-01 DIAGNOSIS — R10.30 ABDOMINAL PAIN, LOWER: ICD-10-CM

## 2021-12-01 DIAGNOSIS — K29.70 GASTROESOPHAGITIS: Primary | ICD-10-CM

## 2021-12-01 DIAGNOSIS — K20.90 GASTROESOPHAGITIS: Primary | ICD-10-CM

## 2021-12-01 DIAGNOSIS — K25.9 GASTRIC ULCER DUE TO HELICOBACTER PYLORI, UNSPECIFIED CHRONICITY: ICD-10-CM

## 2021-12-01 DIAGNOSIS — B96.81 GASTRIC ULCER DUE TO HELICOBACTER PYLORI, UNSPECIFIED CHRONICITY: ICD-10-CM

## 2021-12-01 PROCEDURE — 88305 TISSUE EXAM BY PATHOLOGIST: CPT | Performed by: INTERNAL MEDICINE

## 2021-12-01 PROCEDURE — 0DB68ZX EXCISION OF STOMACH, VIA NATURAL OR ARTIFICIAL OPENING ENDOSCOPIC, DIAGNOSTIC: ICD-10-PCS | Performed by: INTERNAL MEDICINE

## 2021-12-01 PROCEDURE — 81025 URINE PREGNANCY TEST: CPT

## 2021-12-01 PROCEDURE — 88342 IMHCHEM/IMCYTCHM 1ST ANTB: CPT | Performed by: INTERNAL MEDICINE

## 2021-12-01 RX ORDER — SODIUM CHLORIDE, SODIUM LACTATE, POTASSIUM CHLORIDE, CALCIUM CHLORIDE 600; 310; 30; 20 MG/100ML; MG/100ML; MG/100ML; MG/100ML
INJECTION, SOLUTION INTRAVENOUS CONTINUOUS
Status: DISCONTINUED | OUTPATIENT
Start: 2021-12-01 | End: 2021-12-01

## 2021-12-01 RX ORDER — LIDOCAINE HYDROCHLORIDE 10 MG/ML
INJECTION, SOLUTION EPIDURAL; INFILTRATION; INTRACAUDAL; PERINEURAL AS NEEDED
Status: DISCONTINUED | OUTPATIENT
Start: 2021-12-01 | End: 2021-12-01 | Stop reason: SURG

## 2021-12-01 RX ADMIN — LIDOCAINE HYDROCHLORIDE 100 MG: 10 INJECTION, SOLUTION EPIDURAL; INFILTRATION; INTRACAUDAL; PERINEURAL at 12:20:00

## 2021-12-01 NOTE — ANESTHESIA PREPROCEDURE EVALUATION
PRE-OP EVALUATION    Patient Name: Guerrero Daily    Admit Diagnosis: Gastroesophagitis [K29.70, K20.90]  Gastric ulcer due to Helicobacter pylori, unspecified chronicity [K25.9, B96.81]  Abdominal pain, lower [R10.30]    Pre-op Diagnosis: Gastroesophagitis ENDOSCOPY   • OTHER      wisdom teeth extraction   • UPPER GI ENDOSCOPY,EXAM       Social History    Tobacco Use      Smoking status: Never Smoker      Smokeless tobacco: Never Used    Alcohol use: Not Currently      Alcohol/week: 0.0 standard drinks

## 2021-12-01 NOTE — ANESTHESIA POSTPROCEDURE EVALUATION
[address] Patient Status:  Hospital Outpatient Surgery   Age/Gender 38year old female MRN [id number]   Location [address] [facility] 28 Attending Lilly Merchant DO   Hosp Day # 0 PCP Rex Cordero DO       Anesthes

## 2021-12-01 NOTE — H&P
History & Physical Examination    Patient Name: Maribel [place]  MRN: [id number]  [facility]: [id number]  YOB: 1985    Diagnosis: history of h. Pylori gastritis    Present Illness: 37 y/o F history as above presents for EGD.      tiZANidine 2 MG Oral Ta • Migraines    • Night sweats    • Obstructive apnea 10/28/2021    [facility] PSG AHI 8.5   • Pain in joints    • Problems with swallowing    • Rash    • Shortness of breath    • Skin blushing/flushing    • Sleep apnea    • Sleep disturbance    • Stress    •

## 2021-12-01 NOTE — OPERATIVE REPORT
Sharla Chen Patient Status:  Hospital Outpatient Surgery    1985 MRN [id number]   Location [id number] [place] [address] Attending Petey Keith,    Hosp Day # 0 PCP Selcandis Liner, DO       PREOPERATIVE DIAGNOSIS/INDICATIO Follow up with Dr. Odalys Nunez in GI office.      [facility]  [facility]

## 2021-12-13 DIAGNOSIS — G43.009 MIGRAINE WITHOUT AURA AND WITHOUT STATUS MIGRAINOSUS, NOT INTRACTABLE: ICD-10-CM

## 2021-12-14 RX ORDER — PROPRANOLOL HYDROCHLORIDE 40 MG/1
TABLET ORAL
Qty: 270 TABLET | Refills: 0 | Status: SHIPPED | OUTPATIENT
Start: 2021-12-14

## 2022-03-11 RX ORDER — PROPRANOLOL HYDROCHLORIDE 40 MG/1
TABLET ORAL
Qty: 270 TABLET | Refills: 0 | Status: SHIPPED | OUTPATIENT
Start: 2022-03-11

## 2022-04-20 ENCOUNTER — OFFICE VISIT (OUTPATIENT)
Dept: NEUROLOGY | Facility: CLINIC | Age: 37
End: 2022-04-20
Payer: COMMERCIAL

## 2022-04-20 VITALS — HEART RATE: 78 BPM | SYSTOLIC BLOOD PRESSURE: 124 MMHG | RESPIRATION RATE: 14 BRPM | DIASTOLIC BLOOD PRESSURE: 78 MMHG

## 2022-04-20 DIAGNOSIS — G93.5 CHIARI I MALFORMATION (HCC): Primary | ICD-10-CM

## 2022-04-20 DIAGNOSIS — R51.9 CHRONIC DAILY HEADACHE: ICD-10-CM

## 2022-04-20 DIAGNOSIS — G47.33 OSA (OBSTRUCTIVE SLEEP APNEA): ICD-10-CM

## 2022-04-20 DIAGNOSIS — G43.009 MIGRAINE WITHOUT AURA AND WITHOUT STATUS MIGRAINOSUS, NOT INTRACTABLE: ICD-10-CM

## 2022-04-20 PROCEDURE — 99214 OFFICE O/P EST MOD 30 MIN: CPT | Performed by: OTHER

## 2022-04-20 PROCEDURE — 3078F DIAST BP <80 MM HG: CPT | Performed by: OTHER

## 2022-04-20 PROCEDURE — 3074F SYST BP LT 130 MM HG: CPT | Performed by: OTHER

## 2022-04-20 RX ORDER — OMEPRAZOLE 40 MG/1
CAPSULE, DELAYED RELEASE ORAL AS NEEDED
COMMUNITY

## 2022-04-20 RX ORDER — METOPROLOL SUCCINATE 50 MG/1
50 TABLET, EXTENDED RELEASE ORAL 2 TIMES DAILY
COMMUNITY
Start: 2022-04-09

## 2022-04-20 RX ORDER — PHENTERMINE HYDROCHLORIDE 37.5 MG/1
37.5 TABLET ORAL DAILY
COMMUNITY
Start: 2022-03-26

## 2022-04-20 NOTE — PROGRESS NOTES
Patient here to follow up regarding pain. States that pain still present. Did have sleep study done. Nausea daily.

## 2023-01-11 ENCOUNTER — TELEPHONE (OUTPATIENT)
Dept: NEUROLOGY | Facility: CLINIC | Age: 38
End: 2023-01-11

## 2023-01-11 ENCOUNTER — OFFICE VISIT (OUTPATIENT)
Dept: NEUROLOGY | Facility: CLINIC | Age: 38
End: 2023-01-11
Payer: COMMERCIAL

## 2023-01-11 VITALS
DIASTOLIC BLOOD PRESSURE: 74 MMHG | WEIGHT: 258 LBS | HEART RATE: 60 BPM | BODY MASS INDEX: 42 KG/M2 | SYSTOLIC BLOOD PRESSURE: 122 MMHG

## 2023-01-11 DIAGNOSIS — G47.33 OSA (OBSTRUCTIVE SLEEP APNEA): ICD-10-CM

## 2023-01-11 DIAGNOSIS — G44.40 MEDICATION OVERUSE HEADACHE: ICD-10-CM

## 2023-01-11 DIAGNOSIS — G43.009 MIGRAINE WITHOUT AURA AND WITHOUT STATUS MIGRAINOSUS, NOT INTRACTABLE: Primary | ICD-10-CM

## 2023-01-11 DIAGNOSIS — G93.5 CHIARI I MALFORMATION (HCC): ICD-10-CM

## 2023-01-11 PROCEDURE — 3078F DIAST BP <80 MM HG: CPT | Performed by: OTHER

## 2023-01-11 PROCEDURE — 3074F SYST BP LT 130 MM HG: CPT | Performed by: OTHER

## 2023-01-11 PROCEDURE — 99214 OFFICE O/P EST MOD 30 MIN: CPT | Performed by: OTHER

## 2023-01-11 RX ORDER — METHYLPREDNISOLONE 4 MG/1
TABLET ORAL
Qty: 1 EACH | Refills: 0 | Status: SHIPPED | OUTPATIENT
Start: 2023-01-11

## 2023-01-11 RX ORDER — FREMANEZUMAB-VFRM 225 MG/1.5ML
225 INJECTION SUBCUTANEOUS
COMMUNITY
Start: 2023-01-06

## 2023-01-11 NOTE — PROGRESS NOTES
Patient here to follow up regarding migraines and Chiari Malformation. Has been also following with migraine specialist, Dr. Iris Bingham with [facility]. Was taken off beta blocker and placed on Ajovy and Ubrelvy. Has not started Ajovy yet.

## 2023-01-11 NOTE — TELEPHONE ENCOUNTER
Please initiate PA for botox.  Patient has tried and failed 7 medications at least. Has chiari malformation also, and TR, and has been resistant to botox in the past.

## 2023-01-16 NOTE — TELEPHONE ENCOUNTER
[employer] form completed, clinicals attached. Awaiting provider confirmation and signature in order to fax to [facility].

## 2023-02-01 ENCOUNTER — TELEPHONE (OUTPATIENT)
Dept: NEUROLOGY | Facility: CLINIC | Age: 38
End: 2023-02-01

## 2023-02-01 DIAGNOSIS — G43.009 MIGRAINE WITHOUT AURA AND WITHOUT STATUS MIGRAINOSUS, NOT INTRACTABLE: Primary | ICD-10-CM

## 2023-02-01 RX ORDER — ONABOTULINUMTOXINA 200 [USP'U]/1
INJECTION, POWDER, LYOPHILIZED, FOR SOLUTION INTRADERMAL; INTRAMUSCULAR
Qty: 1 EACH | Refills: 3 | Status: SHIPPED | OUTPATIENT
Start: 2023-02-01

## 2023-02-02 NOTE — TELEPHONE ENCOUNTER
BOTOX signed.     Routed to front office to schedule patient for first BOTOX appt and place on waiting list.

## 2023-02-09 ENCOUNTER — PATIENT MESSAGE (OUTPATIENT)
Dept: NEUROLOGY | Facility: CLINIC | Age: 38
End: 2023-02-09

## 2023-02-09 DIAGNOSIS — G43.009 MIGRAINE WITHOUT AURA AND WITHOUT STATUS MIGRAINOSUS, NOT INTRACTABLE: Primary | ICD-10-CM

## 2023-02-09 NOTE — TELEPHONE ENCOUNTER
From: Jaquelin Connell  To: Nancy [place]   Sent: 2/9/2023 10:42 AM CST  Subject: Jessie Willis, I have been having this head pain for three days straight. I took Sean Arnold it went away a little but then it came right back it's in the back of my head just sits there. It has been like this for three days now. I also have a cold and sinus infection so it might be triggering more pain than usual. I'm currently on a Zpack. Is this normal? Just wanting to check because it's been very debilitating.  Thank you, Jaquelin Connell

## 2023-02-24 RX ORDER — METHYLPREDNISOLONE 4 MG/1
TABLET ORAL
Qty: 1 EACH | Refills: 0 | Status: SHIPPED | OUTPATIENT
Start: 2023-02-24

## 2023-03-14 NOTE — TELEPHONE ENCOUNTER
Sent the patient a [employer] message to make an appt for further medication refills. Medication: propranolol 40 MG Oral Tab        Date of last refill: 09/13/2021 (#270/0)  [place]  [place]     Last office visit: 08/25/2021
No

## 2023-04-12 ENCOUNTER — TELEPHONE (OUTPATIENT)
Dept: SURGERY | Facility: CLINIC | Age: 38
End: 2023-04-12

## 2023-05-07 ENCOUNTER — EXTERNAL LAB (OUTPATIENT)
Dept: OTHER | Age: 38
End: 2023-05-07

## 2023-05-07 LAB
APTT PPP: 43.2 SECONDS (ref 23–34.3)
INR PPP: 1 (ref 0.9–1.1)
PROTHROMBIN TIME: 11.8 SECONDS (ref 10.1–13.6)

## 2023-06-07 ENCOUNTER — EXTERNAL LAB (OUTPATIENT)
Dept: OTHER | Age: 38
End: 2023-06-07

## 2023-06-07 LAB
ABO + RH BLD: NORMAL
ABSOLUTE IMMATURE GRANULOCYTES (OFFPRE24): 0 10'3/UL (ref 0–0.1)
ABSOLUTE NRBC (AUTO): 0 10'3/UL
ALBUMIN SERPL-MCNC: 4.1 G/DL (ref 3.5–5)
ALP SERPL-CCNC: 83 UNITS/L (ref 34–104)
ALT SERPL-CCNC: 21 UNITS/L (ref 9–43)
ANION GAP SERPL CALC-SCNC: 9 MMOL/L (ref 4–13)
APPEARANCE UR: CLEAR
AST SERPL-CCNC: 24 UNITS/L (ref 12–39)
BACTERIA #/AREA URNS HPF: ABNORMAL /HPF
BASOPHILS # BLD: 0 10'3/UL (ref 0–0.1)
BASOPHILS NFR BLD: 0.5 % (ref 0–2)
BILIRUB SERPL-MCNC: 0.5 MG/DL (ref 0.2–1.2)
BILIRUB UR QL: NEGATIVE
BUN SERPL-MCNC: 13 MG/DL (ref 7–25)
CALCIUM SERPL-MCNC: 9.1 MG/DL (ref 8.3–10.5)
CHLORIDE SERPL-SCNC: 104 MMOL/L (ref 98–107)
CHOLEST SERPL-MCNC: 204 MG/DL (ref 0–199)
CHOLEST/HDLC SERPL: 3 {RATIO} (ref 0–5)
CO2 SERPL-SCNC: 26 MMOL/L (ref 21–31)
COLOR UR: YELLOW
CREAT SERPL-MCNC: 0.57 MG/DL (ref 0.6–1.3)
EOSINOPHIL # BLD: 0.1 10'3/UL (ref 0–0.6)
EOSINOPHIL NFR BLD: 1.6 % (ref 0–9)
ERYTHROCYTE [DISTWIDTH] IN BLOOD: 15.3 % (ref 11–15)
GFR SERPLBLD SCHWARTZ-ARVRAT: >90 ML/MIN/1.73 M2
GLUCOSE SERPL-MCNC: 87 MG/DL (ref 70–100)
GLUCOSE UR-MCNC: NEGATIVE MG/DL
HCT VFR BLD CALC: 37.3 % (ref 37.4–48.3)
HDLC SERPL-MCNC: 67 MG/DL
HGB BLD-MCNC: 11.1 G/DL (ref 11.9–15.9)
HGB UR QL: ABNORMAL
HYALINE CASTS #/AREA URNS LPF: ABNORMAL /LPF
IMMATURE GRANULOCYTES (OFFPRE25): 0.3 %
KETONES UR-MCNC: NEGATIVE MG/DL
LAB RESULT: NORMAL
LDLC SERPL CALC-MCNC: 115 MG/DL (ref 0–99)
LENGTH OF FAST TIME PATIENT: ABNORMAL H
LENGTH OF FAST TIME PATIENT: ABNORMAL H
LEUKOCYTE ESTERASE UR QL STRIP: NEGATIVE
LYMPHOCYTES # BLD: 1.9 10'3/UL (ref 0.7–3.4)
LYMPHOCYTES NFR BLD: 24.6 % (ref 16–48)
MCH RBC QN AUTO: 25.5 PG (ref 27–33)
MCHC RBC AUTO-ENTMCNC: 29.8 G/DL (ref 32–36)
MCV RBC AUTO: 85.7 FL (ref 82–99)
MONOCYTES # BLD: 0.5 10'3/UL (ref 0.3–1)
MONOCYTES NFR BLD: 6.3 % (ref 4–14)
MPV (OFFPRE2): 9.7 FL (ref 9.8–12.7)
NEUTROPHILS # BLD: 5.1 10'3/UL (ref 1.1–6)
NEUTROPHILS NFR BLD: 66.7 % (ref 37–72)
NITRITE UR QL: NEGATIVE
NONHDLC SERPL-MCNC: 137 MG/DL
NRBC BLD MANUAL-RTO: 0 %
PH UR: ABNORMAL [PH] (ref 5–8)
PLATELET # BLD: 413 10'3/UL (ref 150–450)
POTASSIUM SERPL-SCNC: 4.4 MMOL/L (ref 3.5–5.1)
PROT SERPL-MCNC: 7 G/DL (ref 6.4–8.3)
PROT UR QL: NEGATIVE
RBC # BLD: 4.35 10'6/UL (ref 4.1–5.3)
RBC #/AREA URNS HPF: ABNORMAL /HPF
SODIUM SERPL-SCNC: 139 MMOL/L (ref 133–146)
SP GR UR: 1.02 (ref 1–1.03)
SQUAMOUS #/AREA URNS HPF: ABNORMAL /HPF
TRIGL SERPL-MCNC: 108 MG/DL (ref 0–150)
WBC # BLD: 7.6 10'3/UL (ref 3.6–10.2)
WBC #/AREA URNS HPF: ABNORMAL /HPF

## 2023-06-21 ENCOUNTER — ANESTHESIA EVENT (OUTPATIENT)
Dept: SURGERY | Age: 38
DRG: 742 | End: 2023-06-21

## 2023-06-23 RX ORDER — AMOXICILLIN AND CLAVULANATE POTASSIUM 875; 125 MG/1; MG/1
1 TABLET, FILM COATED ORAL 2 TIMES DAILY
Status: ON HOLD | COMMUNITY
End: 2023-06-26

## 2023-06-23 RX ORDER — RIMEGEPANT SULFATE 75 MG/75MG
75 TABLET, ORALLY DISINTEGRATING ORAL DAILY PRN
COMMUNITY

## 2023-06-23 RX ORDER — METHOCARBAMOL 500 MG/1
500 TABLET, FILM COATED ORAL EVERY 8 HOURS PRN
COMMUNITY

## 2023-06-23 RX ORDER — FREMANEZUMAB-VFRM 225 MG/1.5ML
1 INJECTION SUBCUTANEOUS
COMMUNITY

## 2023-06-26 ENCOUNTER — ANESTHESIA (OUTPATIENT)
Dept: SURGERY | Age: 38
DRG: 742 | End: 2023-06-26

## 2023-06-26 ENCOUNTER — HOSPITAL ENCOUNTER (INPATIENT)
Age: 38
LOS: 3 days | Discharge: HOME | DRG: 742 | End: 2023-06-29
Attending: OBSTETRICS & GYNECOLOGY | Admitting: OBSTETRICS & GYNECOLOGY

## 2023-06-26 DIAGNOSIS — Z98.890 S/P MYOMECTOMY: Primary | ICD-10-CM

## 2023-06-26 DIAGNOSIS — D25.9 UTERINE LEIOMYOMA, UNSPECIFIED LOCATION: ICD-10-CM

## 2023-06-26 LAB
ABO + RH BLD: NORMAL
BASOPHILS # BLD: 0 K/MCL (ref 0–0.3)
BASOPHILS NFR BLD: 0 %
BLD GP AB SCN SERPL QL GEL: NEGATIVE
DEPRECATED RDW RBC: 46.2 FL (ref 39–50)
EOSINOPHIL # BLD: 0.2 K/MCL (ref 0–0.5)
EOSINOPHIL NFR BLD: 1 %
ERYTHROCYTE [DISTWIDTH] IN BLOOD: 15.1 % (ref 11–15)
HCG UR QL: NEGATIVE
HCT VFR BLD CALC: 33.4 % (ref 36–46.5)
HGB BLD-MCNC: 10.3 G/DL (ref 12–15.5)
IMM GRANULOCYTES # BLD AUTO: 0.2 K/MCL (ref 0–0.2)
IMM GRANULOCYTES # BLD: 1 %
LYMPHOCYTES # BLD: 0.5 K/MCL (ref 1–4.8)
LYMPHOCYTES NFR BLD: 2 %
MCH RBC QN AUTO: 25.9 PG (ref 26–34)
MCHC RBC AUTO-ENTMCNC: 30.8 G/DL (ref 32–36.5)
MCV RBC AUTO: 84.1 FL (ref 78–100)
MONOCYTES # BLD: 1.6 K/MCL (ref 0.3–0.9)
MONOCYTES NFR BLD: 6 %
NEUTROPHILS # BLD: 23.7 K/MCL (ref 1.8–7.7)
NEUTROPHILS NFR BLD: 90 %
NRBC BLD MANUAL-RTO: 0 /100 WBC
PLATELET # BLD AUTO: 306 K/MCL (ref 140–450)
RBC # BLD: 3.97 MIL/MCL (ref 4–5.2)
TYPE AND SCREEN EXPIRATION DATE: NORMAL
WBC # BLD: 26.1 K/MCL (ref 4.2–11)

## 2023-06-26 PROCEDURE — 10002800 HB RX 250 W HCPCS

## 2023-06-26 PROCEDURE — 10002801 HB RX 250 W/O HCPCS

## 2023-06-26 PROCEDURE — 10002807 HB RX 258: Performed by: OBSTETRICS & GYNECOLOGY

## 2023-06-26 PROCEDURE — 10004651 HB RX, NO CHARGE ITEM: Performed by: STUDENT IN AN ORGANIZED HEALTH CARE EDUCATION/TRAINING PROGRAM

## 2023-06-26 PROCEDURE — 13000037 HB COMPLEX CASE EACH ADD MINUTE: Performed by: OBSTETRICS & GYNECOLOGY

## 2023-06-26 PROCEDURE — 10002803 HB RX 637: Performed by: STUDENT IN AN ORGANIZED HEALTH CARE EDUCATION/TRAINING PROGRAM

## 2023-06-26 PROCEDURE — 10002801 HB RX 250 W/O HCPCS: Performed by: ANESTHESIOLOGY

## 2023-06-26 PROCEDURE — 0UB90ZZ EXCISION OF UTERUS, OPEN APPROACH: ICD-10-PCS | Performed by: OBSTETRICS & GYNECOLOGY

## 2023-06-26 PROCEDURE — 10004451 HB PACU RECOVERY 1ST 30 MINUTES: Performed by: OBSTETRICS & GYNECOLOGY

## 2023-06-26 PROCEDURE — 10002800 HB RX 250 W HCPCS: Performed by: STUDENT IN AN ORGANIZED HEALTH CARE EDUCATION/TRAINING PROGRAM

## 2023-06-26 PROCEDURE — 13000003 HB ANESTHESIA  GENERAL EA ADD MINUTE: Performed by: OBSTETRICS & GYNECOLOGY

## 2023-06-26 PROCEDURE — 10002800 HB RX 250 W HCPCS: Performed by: ANESTHESIOLOGY

## 2023-06-26 PROCEDURE — 10004452 HB PACU ADDL 30 MINUTES: Performed by: OBSTETRICS & GYNECOLOGY

## 2023-06-26 PROCEDURE — 88305 TISSUE EXAM BY PATHOLOGIST: CPT | Performed by: OBSTETRICS & GYNECOLOGY

## 2023-06-26 PROCEDURE — 13000059 HB CELL SAVER

## 2023-06-26 PROCEDURE — 10000002 HB ROOM CHARGE MED SURG

## 2023-06-26 PROCEDURE — 86900 BLOOD TYPING SEROLOGIC ABO: CPT | Performed by: OBSTETRICS & GYNECOLOGY

## 2023-06-26 PROCEDURE — 10006023 HB SUPPLY 272: Performed by: OBSTETRICS & GYNECOLOGY

## 2023-06-26 PROCEDURE — 13000002 HB ANESTHESIA  GENERAL  S/U + 1ST 15 MIN: Performed by: OBSTETRICS & GYNECOLOGY

## 2023-06-26 PROCEDURE — 10002801 HB RX 250 W/O HCPCS: Performed by: OBSTETRICS & GYNECOLOGY

## 2023-06-26 PROCEDURE — 36415 COLL VENOUS BLD VENIPUNCTURE: CPT | Performed by: OBSTETRICS & GYNECOLOGY

## 2023-06-26 PROCEDURE — 85025 COMPLETE CBC W/AUTO DIFF WBC: CPT | Performed by: OBSTETRICS & GYNECOLOGY

## 2023-06-26 PROCEDURE — 10002807 HB RX 258: Performed by: ANESTHESIOLOGY

## 2023-06-26 PROCEDURE — 10002807 HB RX 258

## 2023-06-26 PROCEDURE — 84703 CHORIONIC GONADOTROPIN ASSAY: CPT

## 2023-06-26 PROCEDURE — 13000036 HB COMPLEX  CASE S/U + 1ST 15 MIN: Performed by: OBSTETRICS & GYNECOLOGY

## 2023-06-26 PROCEDURE — 10002807 HB RX 258: Performed by: STUDENT IN AN ORGANIZED HEALTH CARE EDUCATION/TRAINING PROGRAM

## 2023-06-26 PROCEDURE — 10002800 HB RX 250 W HCPCS: Performed by: OBSTETRICS & GYNECOLOGY

## 2023-06-26 RX ORDER — NEOSTIGMINE METHYLSULFATE 4 MG/4 ML
SYRINGE (ML) INTRAVENOUS PRN
Status: DISCONTINUED | OUTPATIENT
Start: 2023-06-26 | End: 2023-06-26

## 2023-06-26 RX ORDER — IBUPROFEN 600 MG/1
600 TABLET ORAL EVERY 6 HOURS SCHEDULED
Status: DISCONTINUED | OUTPATIENT
Start: 2023-06-27 | End: 2023-06-29 | Stop reason: HOSPADM

## 2023-06-26 RX ORDER — NALOXONE HCL 0.4 MG/ML
0.1 VIAL (ML) INJECTION PRN
Status: DISCONTINUED | OUTPATIENT
Start: 2023-06-26 | End: 2023-06-29 | Stop reason: HOSPADM

## 2023-06-26 RX ORDER — NALBUPHINE HYDROCHLORIDE 10 MG/ML
2.5 INJECTION, SOLUTION INTRAMUSCULAR; INTRAVENOUS; SUBCUTANEOUS EVERY 8 HOURS PRN
Status: DISCONTINUED | OUTPATIENT
Start: 2023-06-26 | End: 2023-06-29 | Stop reason: HOSPADM

## 2023-06-26 RX ORDER — SODIUM CHLORIDE, SODIUM LACTATE, POTASSIUM CHLORIDE, CALCIUM CHLORIDE 600; 310; 30; 20 MG/100ML; MG/100ML; MG/100ML; MG/100ML
INJECTION, SOLUTION INTRAVENOUS CONTINUOUS
Status: DISCONTINUED | OUTPATIENT
Start: 2023-06-26 | End: 2023-06-27

## 2023-06-26 RX ORDER — SODIUM CHLORIDE, SODIUM LACTATE, POTASSIUM CHLORIDE, CALCIUM CHLORIDE 600; 310; 30; 20 MG/100ML; MG/100ML; MG/100ML; MG/100ML
INJECTION, SOLUTION INTRAVENOUS CONTINUOUS
Status: DISCONTINUED | OUTPATIENT
Start: 2023-06-26 | End: 2023-06-26 | Stop reason: SDUPTHER

## 2023-06-26 RX ORDER — ROCURONIUM BROMIDE 10 MG/ML
INJECTION, SOLUTION INTRAVENOUS PRN
Status: DISCONTINUED | OUTPATIENT
Start: 2023-06-26 | End: 2023-06-26

## 2023-06-26 RX ORDER — ACETAMINOPHEN 325 MG/1
650 TABLET ORAL EVERY 4 HOURS PRN
Status: DISCONTINUED | OUTPATIENT
Start: 2023-06-26 | End: 2023-06-26 | Stop reason: HOSPADM

## 2023-06-26 RX ORDER — METOCLOPRAMIDE HYDROCHLORIDE 5 MG/ML
10 INJECTION INTRAMUSCULAR; INTRAVENOUS EVERY 6 HOURS PRN
Status: DISCONTINUED | OUTPATIENT
Start: 2023-06-26 | End: 2023-06-29 | Stop reason: HOSPADM

## 2023-06-26 RX ORDER — ONDANSETRON 2 MG/ML
4 INJECTION INTRAMUSCULAR; INTRAVENOUS EVERY 12 HOURS PRN
Status: DISCONTINUED | OUTPATIENT
Start: 2023-06-26 | End: 2023-06-29 | Stop reason: HOSPADM

## 2023-06-26 RX ORDER — POLYETHYLENE GLYCOL 3350 17 G/17G
17 POWDER, FOR SOLUTION ORAL DAILY PRN
Status: DISCONTINUED | OUTPATIENT
Start: 2023-06-26 | End: 2023-06-29 | Stop reason: HOSPADM

## 2023-06-26 RX ORDER — SODIUM CHLORIDE, SODIUM LACTATE, POTASSIUM CHLORIDE, CALCIUM CHLORIDE 600; 310; 30; 20 MG/100ML; MG/100ML; MG/100ML; MG/100ML
INJECTION, SOLUTION INTRAVENOUS
Status: DISPENSED
Start: 2023-06-26 | End: 2023-06-27

## 2023-06-26 RX ORDER — SIMETHICONE 125 MG
125 TABLET,CHEWABLE ORAL EVERY 4 HOURS PRN
Status: DISCONTINUED | OUTPATIENT
Start: 2023-06-26 | End: 2023-06-29 | Stop reason: HOSPADM

## 2023-06-26 RX ORDER — SODIUM CHLORIDE 9 MG/ML
INJECTION, SOLUTION INTRAVENOUS CONTINUOUS PRN
Status: DISCONTINUED | OUTPATIENT
Start: 2023-06-26 | End: 2023-06-26

## 2023-06-26 RX ORDER — BISACODYL 10 MG
10 SUPPOSITORY, RECTAL RECTAL DAILY PRN
Status: DISCONTINUED | OUTPATIENT
Start: 2023-06-26 | End: 2023-06-29 | Stop reason: HOSPADM

## 2023-06-26 RX ORDER — ACETAMINOPHEN 650 MG/1
650 SUPPOSITORY RECTAL EVERY 4 HOURS PRN
Status: DISCONTINUED | OUTPATIENT
Start: 2023-06-26 | End: 2023-06-26 | Stop reason: HOSPADM

## 2023-06-26 RX ORDER — ONDANSETRON 4 MG/1
4 TABLET, ORALLY DISINTEGRATING ORAL EVERY 12 HOURS PRN
Status: DISCONTINUED | OUTPATIENT
Start: 2023-06-26 | End: 2023-06-29 | Stop reason: HOSPADM

## 2023-06-26 RX ORDER — PROPOFOL 10 MG/ML
INJECTION, EMULSION INTRAVENOUS PRN
Status: DISCONTINUED | OUTPATIENT
Start: 2023-06-26 | End: 2023-06-26

## 2023-06-26 RX ORDER — FAMOTIDINE 20 MG/1
20 TABLET, FILM COATED ORAL DAILY
Status: DISCONTINUED | OUTPATIENT
Start: 2023-06-26 | End: 2023-06-29 | Stop reason: HOSPADM

## 2023-06-26 RX ORDER — 0.9 % SODIUM CHLORIDE 0.9 %
2 VIAL (ML) INJECTION EVERY 12 HOURS SCHEDULED
Status: DISCONTINUED | OUTPATIENT
Start: 2023-06-26 | End: 2023-06-29 | Stop reason: HOSPADM

## 2023-06-26 RX ORDER — GLYCOPYRROLATE 0.2 MG/ML
INJECTION, SOLUTION INTRAMUSCULAR; INTRAVENOUS PRN
Status: DISCONTINUED | OUTPATIENT
Start: 2023-06-26 | End: 2023-06-26

## 2023-06-26 RX ORDER — DEXAMETHASONE SODIUM PHOSPHATE 4 MG/ML
INJECTION, SOLUTION INTRA-ARTICULAR; INTRALESIONAL; INTRAMUSCULAR; INTRAVENOUS; SOFT TISSUE PRN
Status: DISCONTINUED | OUTPATIENT
Start: 2023-06-26 | End: 2023-06-26

## 2023-06-26 RX ORDER — TRANEXAMIC ACID 10 MG/ML
1000 INJECTION, SOLUTION INTRAVENOUS ONCE
Status: COMPLETED | OUTPATIENT
Start: 2023-06-26 | End: 2023-06-26

## 2023-06-26 RX ORDER — MIDAZOLAM HYDROCHLORIDE 1 MG/ML
INJECTION, SOLUTION INTRAMUSCULAR; INTRAVENOUS PRN
Status: DISCONTINUED | OUTPATIENT
Start: 2023-06-26 | End: 2023-06-26

## 2023-06-26 RX ORDER — SODIUM CHLORIDE 9 MG/ML
INJECTION, SOLUTION INTRAVENOUS CONTINUOUS
Status: DISCONTINUED | OUTPATIENT
Start: 2023-06-26 | End: 2023-06-27

## 2023-06-26 RX ORDER — KETOROLAC TROMETHAMINE 15 MG/ML
15 INJECTION, SOLUTION INTRAMUSCULAR; INTRAVENOUS EVERY 6 HOURS
Status: COMPLETED | OUTPATIENT
Start: 2023-06-26 | End: 2023-06-27

## 2023-06-26 RX ORDER — HYDROMORPHONE HCL IN 0.9% NACL 0.2 MG/ML
PLASTIC BAG, INJECTION (ML) INTRAVENOUS CONTINUOUS
Status: DISCONTINUED | OUTPATIENT
Start: 2023-06-26 | End: 2023-06-27

## 2023-06-26 RX ORDER — ACETAMINOPHEN 325 MG/1
650 TABLET ORAL EVERY 6 HOURS SCHEDULED
Status: DISCONTINUED | OUTPATIENT
Start: 2023-06-26 | End: 2023-06-29 | Stop reason: HOSPADM

## 2023-06-26 RX ORDER — METOCLOPRAMIDE 10 MG/1
10 TABLET ORAL EVERY 6 HOURS PRN
Status: DISCONTINUED | OUTPATIENT
Start: 2023-06-26 | End: 2023-06-29 | Stop reason: HOSPADM

## 2023-06-26 RX ORDER — AMOXICILLIN 250 MG
2 CAPSULE ORAL 2 TIMES DAILY PRN
Status: DISCONTINUED | OUTPATIENT
Start: 2023-06-26 | End: 2023-06-29 | Stop reason: HOSPADM

## 2023-06-26 RX ORDER — ONDANSETRON 2 MG/ML
INJECTION INTRAMUSCULAR; INTRAVENOUS PRN
Status: DISCONTINUED | OUTPATIENT
Start: 2023-06-26 | End: 2023-06-26

## 2023-06-26 RX ADMIN — PROPOFOL 200 MG: 10 INJECTION, EMULSION INTRAVENOUS at 10:28

## 2023-06-26 RX ADMIN — METRONIDAZOLE 500 MG: 500 INJECTION, SOLUTION INTRAVENOUS at 13:25

## 2023-06-26 RX ADMIN — CEFAZOLIN SODIUM 2000 MG: 300 INJECTION, POWDER, LYOPHILIZED, FOR SOLUTION INTRAVENOUS at 10:35

## 2023-06-26 RX ADMIN — SODIUM CHLORIDE, POTASSIUM CHLORIDE, SODIUM LACTATE AND CALCIUM CHLORIDE: 600; 310; 30; 20 INJECTION, SOLUTION INTRAVENOUS at 10:20

## 2023-06-26 RX ADMIN — SUGAMMADEX 200 MG: 100 INJECTION, SOLUTION INTRAVENOUS at 14:16

## 2023-06-26 RX ADMIN — Medication: at 15:34

## 2023-06-26 RX ADMIN — MIDAZOLAM HYDROCHLORIDE 2 MG: 1 INJECTION, SOLUTION INTRAMUSCULAR; INTRAVENOUS at 10:22

## 2023-06-26 RX ADMIN — FENTANYL CITRATE 50 MCG: 50 INJECTION, SOLUTION INTRAMUSCULAR; INTRAVENOUS at 13:36

## 2023-06-26 RX ADMIN — DEXAMETHASONE SODIUM PHOSPHATE 8 MG: 4 INJECTION, SOLUTION INTRAMUSCULAR; INTRAVENOUS at 10:37

## 2023-06-26 RX ADMIN — TRANEXAMIC ACID 1000 MG: 1 INJECTION, SOLUTION INTRAVENOUS at 13:44

## 2023-06-26 RX ADMIN — HYDROMORPHONE HYDROCHLORIDE 0.4 MG: 1 INJECTION, SOLUTION INTRAMUSCULAR; INTRAVENOUS; SUBCUTANEOUS at 15:00

## 2023-06-26 RX ADMIN — Medication 5 MG: at 14:08

## 2023-06-26 RX ADMIN — SODIUM CHLORIDE, POTASSIUM CHLORIDE, SODIUM LACTATE AND CALCIUM CHLORIDE: 600; 310; 30; 20 INJECTION, SOLUTION INTRAVENOUS at 13:10

## 2023-06-26 RX ADMIN — GLYCOPYRROLATE 0.4 MG: 0.2 INJECTION, SOLUTION INTRAMUSCULAR; INTRAVENOUS at 14:08

## 2023-06-26 RX ADMIN — ACETAMINOPHEN 650 MG: 325 TABLET ORAL at 18:24

## 2023-06-26 RX ADMIN — HYDROMORPHONE HYDROCHLORIDE 0.5 MG: 1 INJECTION, SOLUTION INTRAMUSCULAR; INTRAVENOUS; SUBCUTANEOUS at 12:55

## 2023-06-26 RX ADMIN — FAMOTIDINE 20 MG: 20 TABLET, FILM COATED ORAL at 18:24

## 2023-06-26 RX ADMIN — METRONIDAZOLE 500 MG: 500 INJECTION, SOLUTION INTRAVENOUS at 13:15

## 2023-06-26 RX ADMIN — SODIUM CHLORIDE: 9 INJECTION, SOLUTION INTRAVENOUS at 12:08

## 2023-06-26 RX ADMIN — CEFAZOLIN SODIUM 2000 MG: 300 INJECTION, POWDER, LYOPHILIZED, FOR SOLUTION INTRAVENOUS at 13:11

## 2023-06-26 RX ADMIN — KETOROLAC TROMETHAMINE 15 MG: 15 INJECTION, SOLUTION INTRAMUSCULAR; INTRAVENOUS at 21:37

## 2023-06-26 RX ADMIN — FENTANYL CITRATE 50 MCG: 50 INJECTION, SOLUTION INTRAMUSCULAR; INTRAVENOUS at 10:36

## 2023-06-26 RX ADMIN — HYDROMORPHONE HYDROCHLORIDE 0.4 MG: 1 INJECTION, SOLUTION INTRAMUSCULAR; INTRAVENOUS; SUBCUTANEOUS at 15:05

## 2023-06-26 RX ADMIN — ONDANSETRON 4 MG: 2 INJECTION INTRAMUSCULAR; INTRAVENOUS at 13:55

## 2023-06-26 RX ADMIN — FENTANYL CITRATE 25 MCG: 50 INJECTION, SOLUTION INTRAMUSCULAR; INTRAVENOUS at 14:25

## 2023-06-26 RX ADMIN — ROCURONIUM BROMIDE 10 MG: 10 INJECTION, SOLUTION INTRAVENOUS at 10:29

## 2023-06-26 RX ADMIN — FENTANYL CITRATE 25 MCG: 50 INJECTION, SOLUTION INTRAMUSCULAR; INTRAVENOUS at 14:45

## 2023-06-26 RX ADMIN — FENTANYL CITRATE 25 MCG: 50 INJECTION, SOLUTION INTRAMUSCULAR; INTRAVENOUS at 15:00

## 2023-06-26 RX ADMIN — SODIUM CHLORIDE, POTASSIUM CHLORIDE, SODIUM LACTATE AND CALCIUM CHLORIDE: 600; 310; 30; 20 INJECTION, SOLUTION INTRAVENOUS at 07:54

## 2023-06-26 RX ADMIN — FENTANYL CITRATE 25 MCG: 50 INJECTION, SOLUTION INTRAMUSCULAR; INTRAVENOUS at 15:15

## 2023-06-26 RX ADMIN — KETOROLAC TROMETHAMINE 30 MG: 30 INJECTION, SOLUTION INTRAMUSCULAR at 13:55

## 2023-06-26 RX ADMIN — FENTANYL CITRATE 25 MCG: 50 INJECTION, SOLUTION INTRAMUSCULAR; INTRAVENOUS at 15:05

## 2023-06-26 RX ADMIN — FENTANYL CITRATE 25 MCG: 50 INJECTION, SOLUTION INTRAMUSCULAR; INTRAVENOUS at 14:35

## 2023-06-26 RX ADMIN — SODIUM CHLORIDE, PRESERVATIVE FREE 2 ML: 5 INJECTION INTRAVENOUS at 21:45

## 2023-06-26 RX ADMIN — Medication 120 MG: at 10:31

## 2023-06-26 RX ADMIN — HYDROMORPHONE HYDROCHLORIDE 0.4 MG: 1 INJECTION, SOLUTION INTRAMUSCULAR; INTRAVENOUS; SUBCUTANEOUS at 14:25

## 2023-06-26 RX ADMIN — SODIUM CHLORIDE, POTASSIUM CHLORIDE, SODIUM LACTATE AND CALCIUM CHLORIDE: 600; 310; 30; 20 INJECTION, SOLUTION INTRAVENOUS at 15:05

## 2023-06-26 RX ADMIN — HYDROMORPHONE HYDROCHLORIDE 0.4 MG: 1 INJECTION, SOLUTION INTRAMUSCULAR; INTRAVENOUS; SUBCUTANEOUS at 14:45

## 2023-06-26 RX ADMIN — HYDROMORPHONE HYDROCHLORIDE 0.5 MG: 1 INJECTION, SOLUTION INTRAMUSCULAR; INTRAVENOUS; SUBCUTANEOUS at 13:59

## 2023-06-26 SDOH — ECONOMIC STABILITY: HOUSING INSECURITY: ARE YOU WORRIED ABOUT LOSING YOUR HOUSING?: NO

## 2023-06-26 SDOH — ECONOMIC STABILITY: HOUSING INSECURITY: WHAT IS YOUR LIVING SITUATION TODAY?: HOUSE

## 2023-06-26 SDOH — SOCIAL STABILITY: SOCIAL INSECURITY: RISK FACTORS: SLEEP APNEA

## 2023-06-26 SDOH — HEALTH STABILITY: GENERAL
BECAUSE OF A PHYSICAL, MENTAL, OR EMOTIONAL CONDITION, DO YOU HAVE SERIOUS DIFFICULTY CONCENTRATING, REMEMBERING OR MAKING DECISIONS?: NO

## 2023-06-26 SDOH — HEALTH STABILITY: PHYSICAL HEALTH: DO YOU HAVE SERIOUS DIFFICULTY WALKING OR CLIMBING STAIRS?: NO

## 2023-06-26 SDOH — SOCIAL STABILITY: SOCIAL INSECURITY: RISK FACTORS: AGE

## 2023-06-26 SDOH — ECONOMIC STABILITY: TRANSPORTATION INSECURITY
IN THE PAST 12 MONTHS, HAS THE LACK OF TRANSPORTATION KEPT YOU FROM MEDICAL APPOINTMENTS OR FROM GETTING MEDICATIONS?: NO

## 2023-06-26 SDOH — ECONOMIC STABILITY: TRANSPORTATION INSECURITY
IN THE PAST 12 MONTHS, HAS LACK OF TRANSPORTATION KEPT YOU FROM MEETINGS, WORK, OR FROM GETTING THINGS NEEDED FOR DAILY LIVING?: NO

## 2023-06-26 SDOH — HEALTH STABILITY: PHYSICAL HEALTH: DO YOU HAVE DIFFICULTY DRESSING OR BATHING?: NO

## 2023-06-26 SDOH — ECONOMIC STABILITY: HOUSING INSECURITY: WHAT IS YOUR LIVING SITUATION TODAY?: SPOUSE

## 2023-06-26 SDOH — SOCIAL STABILITY: SOCIAL INSECURITY: RISK FACTORS: BMI> 30 (OBESITY)

## 2023-06-26 SDOH — SOCIAL STABILITY: SOCIAL NETWORK
HOW OFTEN DO YOU SEE OR TALK TO PEOPLE THAT YOU CARE ABOUT AND FEEL CLOSE TO? (FOR EXAMPLE: TALKING TO FRIENDS ON THE PHONE, VISITING FRIENDS OR FAMILY, GOING TO CHURCH OR CLUB MEETINGS): 5 OR MORE TIMES A WEEK

## 2023-06-26 SDOH — HEALTH STABILITY: GENERAL: BECAUSE OF A PHYSICAL, MENTAL, OR EMOTIONAL CONDITION, DO YOU HAVE DIFFICULTY DOING ERRANDS ALONE?: NO

## 2023-06-26 SDOH — SOCIAL STABILITY: SOCIAL NETWORK: SUPPORT SYSTEMS: FAMILY MEMBERS;SPOUSE

## 2023-06-26 SDOH — ECONOMIC STABILITY: FOOD INSECURITY: HOW OFTEN IN THE PAST 12 MONTHS WERE YOU WORRIED OR STRESSED ABOUT HAVING ENOUGH MONEY TO BUY NUTRITIOUS MEALS?: NEVER

## 2023-06-26 SDOH — ECONOMIC STABILITY: GENERAL

## 2023-06-26 ASSESSMENT — PAIN SCALES - GENERAL
PAINLEVEL_OUTOF10: 7
PAINLEVEL_OUTOF10: 7
PAINLEVEL_OUTOF10: 6
PAINLEVEL_OUTOF10: 6
PAINLEVEL_OUTOF10: 8
PAINLEVEL_OUTOF10: 7
PAINLEVEL_OUTOF10: 6
PAINLEVEL_OUTOF10: 0
PAINLEVEL_OUTOF10: 8
PAINLEVEL_OUTOF10: 10

## 2023-06-26 ASSESSMENT — PATIENT HEALTH QUESTIONNAIRE - PHQ9
IS PATIENT ABLE TO COMPLETE PHQ2 OR PHQ9: YES
2. FEELING DOWN, DEPRESSED OR HOPELESS: NOT AT ALL
SUM OF ALL RESPONSES TO PHQ9 QUESTIONS 1 AND 2: 0
1. LITTLE INTEREST OR PLEASURE IN DOING THINGS: NOT AT ALL
SUM OF ALL RESPONSES TO PHQ9 QUESTIONS 1 AND 2: 0
CLINICAL INTERPRETATION OF PHQ2 SCORE: NO FURTHER SCREENING NEEDED

## 2023-06-26 ASSESSMENT — COLUMBIA-SUICIDE SEVERITY RATING SCALE - C-SSRS
2. HAVE YOU ACTUALLY HAD ANY THOUGHTS OF KILLING YOURSELF?: NO
6. HAVE YOU EVER DONE ANYTHING, STARTED TO DO ANYTHING, OR PREPARED TO DO ANYTHING TO END YOUR LIFE?: NO
1. WITHIN THE PAST MONTH, HAVE YOU WISHED YOU WERE DEAD OR WISHED YOU COULD GO TO SLEEP AND NOT WAKE UP?: NO

## 2023-06-26 ASSESSMENT — ACTIVITIES OF DAILY LIVING (ADL)
ADL_BEFORE_ADMISSION: INDEPENDENT
ADL_SHORT_OF_BREATH: NO
ADL_SCORE: 12
RECENT_DECLINE_ADL: NO

## 2023-06-26 ASSESSMENT — LIFESTYLE VARIABLES
ALCOHOL_USE_STATUS: NO OR LOW RISK WITH VALIDATED TOOL
HOW OFTEN DO YOU HAVE A DRINK CONTAINING ALCOHOL: NEVER
HOW MANY STANDARD DRINKS CONTAINING ALCOHOL DO YOU HAVE ON A TYPICAL DAY: 0,1 OR 2
HOW OFTEN DO YOU HAVE 6 OR MORE DRINKS ON ONE OCCASION: NEVER
AUDIT-C TOTAL SCORE: 0

## 2023-06-26 ASSESSMENT — ENCOUNTER SYMPTOMS: HEADACHES: 1

## 2023-06-27 LAB
ANION GAP SERPL CALC-SCNC: 12 MMOL/L (ref 7–19)
BASOPHILS # BLD: 0 K/MCL (ref 0–0.3)
BASOPHILS NFR BLD: 0 %
BUN SERPL-MCNC: 10 MG/DL (ref 6–20)
BUN/CREAT SERPL: 18 (ref 7–25)
CALCIUM SERPL-MCNC: 7.8 MG/DL (ref 8.4–10.2)
CHLORIDE SERPL-SCNC: 106 MMOL/L (ref 97–110)
CO2 SERPL-SCNC: 22 MMOL/L (ref 21–32)
CREAT SERPL-MCNC: 0.56 MG/DL (ref 0.51–0.95)
DEPRECATED RDW RBC: 46.2 FL (ref 39–50)
EOSINOPHIL # BLD: 0 K/MCL (ref 0–0.5)
EOSINOPHIL NFR BLD: 0 %
ERYTHROCYTE [DISTWIDTH] IN BLOOD: 15 % (ref 11–15)
FASTING DURATION TIME PATIENT: ABNORMAL H
GFR SERPLBLD BASED ON 1.73 SQ M-ARVRAT: >90 ML/MIN
GLUCOSE SERPL-MCNC: 144 MG/DL (ref 70–99)
HCT VFR BLD CALC: 30.3 % (ref 36–46.5)
HGB BLD-MCNC: 9.3 G/DL (ref 12–15.5)
IMM GRANULOCYTES # BLD AUTO: 0.1 K/MCL (ref 0–0.2)
IMM GRANULOCYTES # BLD: 1 %
LYMPHOCYTES # BLD: 1.5 K/MCL (ref 1–4.8)
LYMPHOCYTES NFR BLD: 7 %
MCH RBC QN AUTO: 26 PG (ref 26–34)
MCHC RBC AUTO-ENTMCNC: 30.7 G/DL (ref 32–36.5)
MCV RBC AUTO: 84.6 FL (ref 78–100)
MONOCYTES # BLD: 1.9 K/MCL (ref 0.3–0.9)
MONOCYTES NFR BLD: 9 %
NEUTROPHILS # BLD: 18.2 K/MCL (ref 1.8–7.7)
NEUTROPHILS NFR BLD: 83 %
NRBC BLD MANUAL-RTO: 0 /100 WBC
PLATELET # BLD AUTO: 308 K/MCL (ref 140–450)
POTASSIUM SERPL-SCNC: 4.6 MMOL/L (ref 3.4–5.1)
RBC # BLD: 3.58 MIL/MCL (ref 4–5.2)
SODIUM SERPL-SCNC: 135 MMOL/L (ref 135–145)
WBC # BLD: 21.8 K/MCL (ref 4.2–11)

## 2023-06-27 PROCEDURE — 10004651 HB RX, NO CHARGE ITEM: Performed by: STUDENT IN AN ORGANIZED HEALTH CARE EDUCATION/TRAINING PROGRAM

## 2023-06-27 PROCEDURE — 85025 COMPLETE CBC W/AUTO DIFF WBC: CPT | Performed by: STUDENT IN AN ORGANIZED HEALTH CARE EDUCATION/TRAINING PROGRAM

## 2023-06-27 PROCEDURE — 10000002 HB ROOM CHARGE MED SURG

## 2023-06-27 PROCEDURE — 10002803 HB RX 637: Performed by: OBSTETRICS & GYNECOLOGY

## 2023-06-27 PROCEDURE — 80048 BASIC METABOLIC PNL TOTAL CA: CPT | Performed by: STUDENT IN AN ORGANIZED HEALTH CARE EDUCATION/TRAINING PROGRAM

## 2023-06-27 PROCEDURE — 10002803 HB RX 637: Performed by: STUDENT IN AN ORGANIZED HEALTH CARE EDUCATION/TRAINING PROGRAM

## 2023-06-27 PROCEDURE — 36415 COLL VENOUS BLD VENIPUNCTURE: CPT | Performed by: STUDENT IN AN ORGANIZED HEALTH CARE EDUCATION/TRAINING PROGRAM

## 2023-06-27 PROCEDURE — 10002800 HB RX 250 W HCPCS: Performed by: STUDENT IN AN ORGANIZED HEALTH CARE EDUCATION/TRAINING PROGRAM

## 2023-06-27 RX ORDER — OXYCODONE HYDROCHLORIDE 5 MG/1
5 TABLET ORAL EVERY 4 HOURS PRN
Status: DISCONTINUED | OUTPATIENT
Start: 2023-06-27 | End: 2023-06-28

## 2023-06-27 RX ORDER — DIPHENHYDRAMINE HYDROCHLORIDE 50 MG/ML
25 INJECTION INTRAMUSCULAR; INTRAVENOUS EVERY 4 HOURS PRN
Status: DISCONTINUED | OUTPATIENT
Start: 2023-06-27 | End: 2023-06-29 | Stop reason: HOSPADM

## 2023-06-27 RX ADMIN — IBUPROFEN 600 MG: 600 TABLET ORAL at 13:55

## 2023-06-27 RX ADMIN — ACETAMINOPHEN 650 MG: 325 TABLET ORAL at 00:03

## 2023-06-27 RX ADMIN — KETOROLAC TROMETHAMINE 15 MG: 15 INJECTION, SOLUTION INTRAMUSCULAR; INTRAVENOUS at 02:06

## 2023-06-27 RX ADMIN — DIPHENHYDRAMINE HYDROCHLORIDE 25 MG: 50 INJECTION, SOLUTION INTRAMUSCULAR; INTRAVENOUS at 08:34

## 2023-06-27 RX ADMIN — Medication: at 02:09

## 2023-06-27 RX ADMIN — KETOROLAC TROMETHAMINE 15 MG: 15 INJECTION, SOLUTION INTRAMUSCULAR; INTRAVENOUS at 08:34

## 2023-06-27 RX ADMIN — ACETAMINOPHEN 650 MG: 325 TABLET ORAL at 06:13

## 2023-06-27 RX ADMIN — SODIUM CHLORIDE, PRESERVATIVE FREE 2 ML: 5 INJECTION INTRAVENOUS at 20:40

## 2023-06-27 RX ADMIN — Medication: at 10:24

## 2023-06-27 RX ADMIN — IBUPROFEN 600 MG: 600 TABLET ORAL at 18:46

## 2023-06-27 RX ADMIN — ACETAMINOPHEN 650 MG: 325 TABLET ORAL at 12:28

## 2023-06-27 RX ADMIN — DIPHENHYDRAMINE HYDROCHLORIDE 25 MG: 50 INJECTION, SOLUTION INTRAMUSCULAR; INTRAVENOUS at 23:31

## 2023-06-27 RX ADMIN — FAMOTIDINE 20 MG: 20 TABLET, FILM COATED ORAL at 08:34

## 2023-06-27 RX ADMIN — OXYCODONE HYDROCHLORIDE 5 MG: 5 TABLET ORAL at 20:38

## 2023-06-27 RX ADMIN — SODIUM CHLORIDE, PRESERVATIVE FREE 2 ML: 5 INJECTION INTRAVENOUS at 08:34

## 2023-06-27 ASSESSMENT — PAIN SCALES - GENERAL
PAINLEVEL_OUTOF10: 10
PAINLEVEL_OUTOF10: 7
PAINLEVEL_OUTOF10: 7
PAINLEVEL_OUTOF10: 6
PAINLEVEL_OUTOF10: 7
PAINLEVEL_OUTOF10: 8
PAINLEVEL_OUTOF10: 10

## 2023-06-28 LAB
ASR DISCLAIMER: NORMAL
ATRIAL RATE (BPM): 92
BASOPHILS # BLD: 0 K/MCL (ref 0–0.3)
BASOPHILS NFR BLD: 0 %
BLOOD EXPIRATION DATE: NORMAL
BLOOD EXPIRATION DATE: NORMAL
CASE RPRT: NORMAL
CLINICAL INFO: NORMAL
CROSSMATCH RESULT: NORMAL
DEPRECATED RDW RBC: 46.3 FL (ref 39–50)
DEPRECATED RDW RBC: 48.3 FL (ref 39–50)
DISPENSE STATUS: NORMAL
DISPENSE STATUS: NORMAL
EOSINOPHIL # BLD: 0.1 K/MCL (ref 0–0.5)
EOSINOPHIL NFR BLD: 1 %
ERYTHROCYTE [DISTWIDTH] IN BLOOD: 15.3 % (ref 11–15)
ERYTHROCYTE [DISTWIDTH] IN BLOOD: 15.3 % (ref 11–15)
HCT VFR BLD CALC: 21.6 % (ref 36–46.5)
HCT VFR BLD CALC: 27.2 % (ref 36–46.5)
HGB BLD-MCNC: 6.9 G/DL (ref 12–15.5)
HGB BLD-MCNC: 8.4 G/DL (ref 12–15.5)
IMM GRANULOCYTES # BLD AUTO: 0.1 K/MCL (ref 0–0.2)
IMM GRANULOCYTES # BLD: 1 %
ISBT BLOOD TYPE: 5100
ISBT BLOOD TYPE: 5100
ISSUE DATE/TIME: NORMAL
LYMPHOCYTES # BLD: 2.1 K/MCL (ref 1–4.8)
LYMPHOCYTES NFR BLD: 19 %
MCH RBC QN AUTO: 26.4 PG (ref 26–34)
MCH RBC QN AUTO: 26.6 PG (ref 26–34)
MCHC RBC AUTO-ENTMCNC: 30.9 G/DL (ref 32–36.5)
MCHC RBC AUTO-ENTMCNC: 31.9 G/DL (ref 32–36.5)
MCV RBC AUTO: 82.8 FL (ref 78–100)
MCV RBC AUTO: 86.1 FL (ref 78–100)
MONOCYTES # BLD: 1.1 K/MCL (ref 0.3–0.9)
MONOCYTES NFR BLD: 10 %
NEUTROPHILS # BLD: 7.5 K/MCL (ref 1.8–7.7)
NEUTROPHILS NFR BLD: 69 %
NRBC BLD MANUAL-RTO: 0 /100 WBC
NRBC BLD MANUAL-RTO: 0 /100 WBC
P AXIS (DEGREES): 45
PATH REPORT.FINAL DX SPEC: NORMAL
PATH REPORT.GROSS SPEC: NORMAL
PLATELET # BLD AUTO: 254 K/MCL (ref 140–450)
PLATELET # BLD AUTO: 296 K/MCL (ref 140–450)
PR-INTERVAL (MSEC): 138
PRODUCT CODE: NORMAL
PRODUCT CODE: NORMAL
PRODUCT DESCRIPTION: NORMAL
PRODUCT DESCRIPTION: NORMAL
PRODUCT ID: NORMAL
PRODUCT ID: NORMAL
QRS-INTERVAL (MSEC): 90
QT-INTERVAL (MSEC): 366
QTC: 453
R AXIS (DEGREES): -30
RBC # BLD: 2.61 MIL/MCL (ref 4–5.2)
RBC # BLD: 3.16 MIL/MCL (ref 4–5.2)
REPORT TEXT: NORMAL
T AXIS (DEGREES): -10
UNIT BLOOD TYPE: NORMAL
UNIT BLOOD TYPE: NORMAL
UNIT NUMBER: NORMAL
UNIT NUMBER: NORMAL
VENTRICULAR RATE EKG/MIN (BPM): 92
WBC # BLD: 10.8 K/MCL (ref 4.2–11)
WBC # BLD: 11.3 K/MCL (ref 4.2–11)

## 2023-06-28 PROCEDURE — 93010 ELECTROCARDIOGRAM REPORT: CPT | Performed by: INTERNAL MEDICINE

## 2023-06-28 PROCEDURE — 10002803 HB RX 637: Performed by: OBSTETRICS & GYNECOLOGY

## 2023-06-28 PROCEDURE — P9016 RBC LEUKOCYTES REDUCED: HCPCS

## 2023-06-28 PROCEDURE — 36415 COLL VENOUS BLD VENIPUNCTURE: CPT | Performed by: OBSTETRICS & GYNECOLOGY

## 2023-06-28 PROCEDURE — 13003287

## 2023-06-28 PROCEDURE — 10000002 HB ROOM CHARGE MED SURG

## 2023-06-28 PROCEDURE — 85027 COMPLETE CBC AUTOMATED: CPT | Performed by: STUDENT IN AN ORGANIZED HEALTH CARE EDUCATION/TRAINING PROGRAM

## 2023-06-28 PROCEDURE — 85025 COMPLETE CBC W/AUTO DIFF WBC: CPT | Performed by: OBSTETRICS & GYNECOLOGY

## 2023-06-28 PROCEDURE — 10004651 HB RX, NO CHARGE ITEM: Performed by: STUDENT IN AN ORGANIZED HEALTH CARE EDUCATION/TRAINING PROGRAM

## 2023-06-28 PROCEDURE — 10002803 HB RX 637

## 2023-06-28 PROCEDURE — 10004281 HB COUNTER-STAFF TIME PER 15 MIN

## 2023-06-28 PROCEDURE — 10002803 HB RX 637: Performed by: STUDENT IN AN ORGANIZED HEALTH CARE EDUCATION/TRAINING PROGRAM

## 2023-06-28 PROCEDURE — 93005 ELECTROCARDIOGRAM TRACING: CPT | Performed by: OBSTETRICS & GYNECOLOGY

## 2023-06-28 PROCEDURE — 10002800 HB RX 250 W HCPCS: Performed by: OBSTETRICS & GYNECOLOGY

## 2023-06-28 RX ORDER — FERROUS SULFATE 325(65) MG
325 TABLET ORAL EVERY OTHER DAY
Status: DISCONTINUED | OUTPATIENT
Start: 2023-06-28 | End: 2023-06-29 | Stop reason: HOSPADM

## 2023-06-28 RX ORDER — PREDNISONE 50 MG/1
50 TABLET ORAL EVERY 6 HOURS
Status: COMPLETED | OUTPATIENT
Start: 2023-06-28 | End: 2023-06-29

## 2023-06-28 RX ORDER — DIPHENHYDRAMINE HCL 25 MG
50 CAPSULE ORAL ONCE
Status: COMPLETED | OUTPATIENT
Start: 2023-06-29 | End: 2023-06-29

## 2023-06-28 RX ORDER — OXYCODONE HYDROCHLORIDE 5 MG/1
5 TABLET ORAL EVERY 6 HOURS
Status: DISCONTINUED | OUTPATIENT
Start: 2023-06-28 | End: 2023-06-29 | Stop reason: HOSPADM

## 2023-06-28 RX ORDER — SODIUM CHLORIDE 9 MG/ML
INJECTION, SOLUTION INTRAVENOUS CONTINUOUS PRN
Status: DISCONTINUED | OUTPATIENT
Start: 2023-06-28 | End: 2023-06-29 | Stop reason: HOSPADM

## 2023-06-28 RX ORDER — CALCIUM CARBONATE 500 MG/1
500 TABLET, CHEWABLE ORAL EVERY 4 HOURS PRN
Status: DISCONTINUED | OUTPATIENT
Start: 2023-06-28 | End: 2023-06-29 | Stop reason: HOSPADM

## 2023-06-28 RX ADMIN — HYDROMORPHONE HYDROCHLORIDE 0.2 MG: 0.5 INJECTION, SOLUTION INTRAMUSCULAR; INTRAVENOUS; SUBCUTANEOUS at 08:31

## 2023-06-28 RX ADMIN — ACETAMINOPHEN 650 MG: 325 TABLET ORAL at 18:01

## 2023-06-28 RX ADMIN — FERROUS SULFATE TAB 325 MG (65 MG ELEMENTAL FE) 325 MG: 325 (65 FE) TAB at 08:31

## 2023-06-28 RX ADMIN — PREDNISONE 50 MG: 50 TABLET ORAL at 20:15

## 2023-06-28 RX ADMIN — ACETAMINOPHEN 650 MG: 325 TABLET ORAL at 00:11

## 2023-06-28 RX ADMIN — SODIUM CHLORIDE, PRESERVATIVE FREE 2 ML: 5 INJECTION INTRAVENOUS at 08:32

## 2023-06-28 RX ADMIN — ACETAMINOPHEN 650 MG: 325 TABLET ORAL at 13:27

## 2023-06-28 RX ADMIN — SODIUM CHLORIDE, PRESERVATIVE FREE 2 ML: 5 INJECTION INTRAVENOUS at 20:15

## 2023-06-28 RX ADMIN — FAMOTIDINE 20 MG: 20 TABLET, FILM COATED ORAL at 08:31

## 2023-06-28 RX ADMIN — OXYCODONE HYDROCHLORIDE 5 MG: 5 TABLET ORAL at 14:56

## 2023-06-28 RX ADMIN — ANTACID TABLETS 500 MG: 500 TABLET, CHEWABLE ORAL at 22:47

## 2023-06-28 RX ADMIN — IBUPROFEN 600 MG: 600 TABLET ORAL at 18:01

## 2023-06-28 RX ADMIN — OXYCODONE HYDROCHLORIDE 5 MG: 5 TABLET ORAL at 20:48

## 2023-06-28 RX ADMIN — IBUPROFEN 600 MG: 600 TABLET ORAL at 05:40

## 2023-06-28 RX ADMIN — IBUPROFEN 600 MG: 600 TABLET ORAL at 13:27

## 2023-06-28 RX ADMIN — IBUPROFEN 600 MG: 600 TABLET ORAL at 00:11

## 2023-06-28 RX ADMIN — ACETAMINOPHEN 650 MG: 325 TABLET ORAL at 05:40

## 2023-06-28 ASSESSMENT — PAIN SCALES - GENERAL
PAINLEVEL_OUTOF10: 7
PAINLEVEL_OUTOF10: 4
PAINLEVEL_OUTOF10: 5
PAINLEVEL_OUTOF10: 6
PAINLEVEL_OUTOF10: 7
PAINLEVEL_OUTOF10: 8

## 2023-06-29 ENCOUNTER — APPOINTMENT (OUTPATIENT)
Dept: CT IMAGING | Age: 38
DRG: 742 | End: 2023-06-29
Attending: STUDENT IN AN ORGANIZED HEALTH CARE EDUCATION/TRAINING PROGRAM

## 2023-06-29 VITALS
HEIGHT: 66 IN | DIASTOLIC BLOOD PRESSURE: 78 MMHG | WEIGHT: 260 LBS | TEMPERATURE: 98 F | HEART RATE: 109 BPM | BODY MASS INDEX: 41.78 KG/M2 | RESPIRATION RATE: 17 BRPM | OXYGEN SATURATION: 100 % | SYSTOLIC BLOOD PRESSURE: 112 MMHG

## 2023-06-29 PROBLEM — D62 ACUTE BLOOD LOSS ANEMIA: Status: ACTIVE | Noted: 2023-06-29

## 2023-06-29 LAB
BASOPHILS # BLD: 0 K/MCL (ref 0–0.3)
BASOPHILS NFR BLD: 0 %
DEPRECATED RDW RBC: 46.5 FL (ref 39–50)
EOSINOPHIL # BLD: 0 K/MCL (ref 0–0.5)
EOSINOPHIL NFR BLD: 0 %
ERYTHROCYTE [DISTWIDTH] IN BLOOD: 15.3 % (ref 11–15)
HCT VFR BLD CALC: 26.2 % (ref 36–46.5)
HGB BLD-MCNC: 8.2 G/DL (ref 12–15.5)
IMM GRANULOCYTES # BLD AUTO: 0.1 K/MCL (ref 0–0.2)
IMM GRANULOCYTES # BLD: 1 %
LYMPHOCYTES # BLD: 0.8 K/MCL (ref 1–4.8)
LYMPHOCYTES NFR BLD: 7 %
MCH RBC QN AUTO: 26.4 PG (ref 26–34)
MCHC RBC AUTO-ENTMCNC: 31.3 G/DL (ref 32–36.5)
MCV RBC AUTO: 84.2 FL (ref 78–100)
MONOCYTES # BLD: 0.3 K/MCL (ref 0.3–0.9)
MONOCYTES NFR BLD: 3 %
NEUTROPHILS # BLD: 10.2 K/MCL (ref 1.8–7.7)
NEUTROPHILS NFR BLD: 89 %
NRBC BLD MANUAL-RTO: 0 /100 WBC
PLATELET # BLD AUTO: 280 K/MCL (ref 140–450)
RBC # BLD: 3.11 MIL/MCL (ref 4–5.2)
WBC # BLD: 11.5 K/MCL (ref 4.2–11)

## 2023-06-29 PROCEDURE — 10002803 HB RX 637: Performed by: STUDENT IN AN ORGANIZED HEALTH CARE EDUCATION/TRAINING PROGRAM

## 2023-06-29 PROCEDURE — 74177 CT ABD & PELVIS W/CONTRAST: CPT

## 2023-06-29 PROCEDURE — 85025 COMPLETE CBC W/AUTO DIFF WBC: CPT | Performed by: OBSTETRICS & GYNECOLOGY

## 2023-06-29 PROCEDURE — 10002805 HB CONTRAST AGENT: Performed by: STUDENT IN AN ORGANIZED HEALTH CARE EDUCATION/TRAINING PROGRAM

## 2023-06-29 PROCEDURE — 36415 COLL VENOUS BLD VENIPUNCTURE: CPT | Performed by: OBSTETRICS & GYNECOLOGY

## 2023-06-29 PROCEDURE — 10004651 HB RX, NO CHARGE ITEM: Performed by: STUDENT IN AN ORGANIZED HEALTH CARE EDUCATION/TRAINING PROGRAM

## 2023-06-29 RX ORDER — OXYCODONE HYDROCHLORIDE 5 MG/1
5 TABLET ORAL EVERY 6 HOURS
Qty: 10 TABLET | Refills: 0 | Status: SHIPPED | OUTPATIENT
Start: 2023-06-29 | End: 2023-10-23 | Stop reason: ALTCHOICE

## 2023-06-29 RX ORDER — FERROUS SULFATE 325(65) MG
325 TABLET ORAL 2 TIMES DAILY
Qty: 60 TABLET | Refills: 0 | Status: SHIPPED | OUTPATIENT
Start: 2023-06-29

## 2023-06-29 RX ORDER — DOCUSATE SODIUM 100 MG/1
100 CAPSULE, LIQUID FILLED ORAL 2 TIMES DAILY
Qty: 60 CAPSULE | Refills: 0 | Status: SHIPPED | OUTPATIENT
Start: 2023-06-29

## 2023-06-29 RX ORDER — ACETAMINOPHEN 325 MG/1
650 TABLET ORAL EVERY 6 HOURS SCHEDULED
Qty: 30 TABLET | Refills: 0 | Status: SHIPPED | OUTPATIENT
Start: 2023-06-29 | End: 2023-10-23 | Stop reason: ALTCHOICE

## 2023-06-29 RX ORDER — IBUPROFEN 600 MG/1
600 TABLET ORAL EVERY 6 HOURS SCHEDULED
Qty: 60 TABLET | Refills: 0 | Status: SHIPPED | OUTPATIENT
Start: 2023-06-29 | End: 2023-10-23 | Stop reason: ALTCHOICE

## 2023-06-29 RX ORDER — FAMOTIDINE 20 MG/1
10 TABLET, FILM COATED ORAL 2 TIMES DAILY
Qty: 60 TABLET | Refills: 0 | Status: ON HOLD | OUTPATIENT
Start: 2023-06-29 | End: 2023-10-27

## 2023-06-29 RX ADMIN — DIPHENHYDRAMINE HYDROCHLORIDE 50 MG: 25 CAPSULE ORAL at 08:11

## 2023-06-29 RX ADMIN — ACETAMINOPHEN 650 MG: 325 TABLET ORAL at 12:58

## 2023-06-29 RX ADMIN — SODIUM CHLORIDE, PRESERVATIVE FREE 2 ML: 5 INJECTION INTRAVENOUS at 08:14

## 2023-06-29 RX ADMIN — FAMOTIDINE 20 MG: 20 TABLET, FILM COATED ORAL at 08:11

## 2023-06-29 RX ADMIN — OXYCODONE HYDROCHLORIDE 5 MG: 5 TABLET ORAL at 14:47

## 2023-06-29 RX ADMIN — OXYCODONE HYDROCHLORIDE 5 MG: 5 TABLET ORAL at 02:32

## 2023-06-29 RX ADMIN — ACETAMINOPHEN 650 MG: 325 TABLET ORAL at 00:12

## 2023-06-29 RX ADMIN — OXYCODONE HYDROCHLORIDE 5 MG: 5 TABLET ORAL at 08:11

## 2023-06-29 RX ADMIN — PREDNISONE 50 MG: 50 TABLET ORAL at 08:11

## 2023-06-29 RX ADMIN — IOHEXOL 80 ML: 350 INJECTION, SOLUTION INTRAVENOUS at 09:27

## 2023-06-29 RX ADMIN — PREDNISONE 50 MG: 50 TABLET ORAL at 02:32

## 2023-06-29 RX ADMIN — IBUPROFEN 600 MG: 600 TABLET ORAL at 00:12

## 2023-06-29 RX ADMIN — IBUPROFEN 600 MG: 600 TABLET ORAL at 12:58

## 2023-06-29 RX ADMIN — ACETAMINOPHEN 650 MG: 325 TABLET ORAL at 05:43

## 2023-06-29 RX ADMIN — IBUPROFEN 600 MG: 600 TABLET ORAL at 05:43

## 2023-06-29 ASSESSMENT — PAIN SCALES - GENERAL
PAINLEVEL_OUTOF10: 5

## 2023-06-30 ENCOUNTER — CLINICAL ABSTRACT (OUTPATIENT)
Dept: FAMILY MEDICINE | Age: 38
End: 2023-06-30

## 2023-09-26 ENCOUNTER — EXTERNAL LAB (OUTPATIENT)
Dept: OTHER | Age: 38
End: 2023-09-26

## 2023-09-26 LAB
ALBUMIN SERPL-MCNC: 4.1 G/DL (ref 3.6–5.1)
ALBUMIN/GLOB SERPL: 1.3 (CALC) (ref 1–2.5)
ALP SERPL-CCNC: 90 U/L (ref 31–125)
ALT SERPL-CCNC: 49 U/L (ref 6–29)
APPEARANCE UR: ABNORMAL
APTT PPP: 34 SEC (ref 23–32)
AST SERPL-CCNC: 42 UL (ref 10–30)
BACTERIA #/AREA URNS HPF: ABNORMAL /HPF
BASOPHILS # BLD: 47 CELLS/UL (ref 0–200)
BASOPHILS NFR BLD: 0.6 %
BILIRUB SERPL-MCNC: 0.4 MG/DL (ref 0.2–1.2)
BILIRUB UR QL: NEGATIVE
BUN SERPL-MCNC: 11 MG/DL (ref 7–25)
BUN/CREAT SERPL: ABNORMAL (CALC) (ref 6–22)
CALCIUM SERPL-MCNC: 9.4 MG/DL (ref 8.6–10.2)
CHLORIDE SERPL-SCNC: 103 MMOL/L (ref 98–110)
CO2 SERPL-SCNC: 26 MMOL/L (ref 20–32)
COLOR UR: YELLOW
CREAT SERPL-MCNC: 0.7 MG/DL (ref 0.5–0.97)
EOSINOPHIL # BLD: 87 CELLS/UL (ref 15–500)
EOSINOPHIL NFR BLD: 1.1 %
ERYTHROCYTE [DISTWIDTH] IN BLOOD: 14.2 % (ref 11–15)
GFR SERPLBLD SCHWARTZ-ARVRAT: 113 ML/MIN/1.73M2
GLOBULIN SER-MCNC: 3.1 G/DL (CALC) (ref 1.9–3.7)
GLUCOSE SERPL-MCNC: 87 MG/DL (ref 65–99)
GLUCOSE UR-MCNC: NEGATIVE MG/DL
HCT VFR BLD CALC: 32.5 % (ref 35–45)
HGB BLD-MCNC: 10.1 G/DL (ref 11.7–15.5)
HGB UR QL: ABNORMAL
HYALINE CASTS #/AREA URNS LPF: ABNORMAL /LPF
INR PPP: 1
KETONES UR-MCNC: NEGATIVE MG/DL
LAB RESULT: NORMAL
LENGTH OF FAST TIME PATIENT: ABNORMAL H
LEUKOCYTE ESTERASE UR QL STRIP: ABNORMAL
LYMPHOCYTES # BLD: 1983 CELLS/UL (ref 850–3900)
LYMPHOCYTES NFR BLD: 25.1 %
MCH RBC QN AUTO: 24.7 PG (ref 27–33)
MCHC RBC AUTO-ENTMCNC: 31.1 G/DL (ref 32–36)
MCV RBC AUTO: 79.5 FL (ref 80–100)
MONOCYTES # BLD: 537 CELLS/UL (ref 200–950)
MONOCYTES NFR BLD: 6.8 %
MPV (OFFPRE2): 9.7 FL (ref 7.5–12.5)
NEUTROPHILS # BLD: 5246 CELLS/UL (ref 1500–7000)
NEUTROPHILS NFR BLD: 66.4 %
NITRITE UR QL: NEGATIVE
PH UR: ABNORMAL [PH] (ref 5–8)
PLATELET # BLD: 424 THOUSAND/UL (ref 140–400)
POTASSIUM SERPL-SCNC: 4.7 MMOL/L (ref 3.5–5.3)
PROT SERPL-MCNC: 7.2 G/DL (ref 6.1–8.1)
PROT UR QL: ABNORMAL
PROTHROMBIN TIME: 10.3 SEC (ref 9–11.5)
RBC # BLD: 4.09 MILLION/UL (ref 3.8–5.1)
RBC #/AREA URNS HPF: ABNORMAL /HPF
SODIUM SERPL-SCNC: 137 MMOL/L (ref 135–146)
SP GR UR: 1.02 (ref 1–1.03)
SQUAMOUS #/AREA URNS HPF: ABNORMAL /HPF
WBC # BLD: 7.9 THOUSAND/UL (ref 3.8–10.8)
WBC #/AREA URNS HPF: ABNORMAL /HPF

## 2023-10-23 ASSESSMENT — ACTIVITIES OF DAILY LIVING (ADL)
ADL_SCORE: 12
ADL_BEFORE_ADMISSION: INDEPENDENT

## 2023-10-24 ENCOUNTER — ANESTHESIA EVENT (OUTPATIENT)
Dept: SURGERY | Age: 38
End: 2023-10-24

## 2023-10-25 ENCOUNTER — HOSPITAL ENCOUNTER (OUTPATIENT)
Age: 38
Setting detail: OBSERVATION
Discharge: HOME | End: 2023-10-27
Attending: OBSTETRICS & GYNECOLOGY | Admitting: OBSTETRICS & GYNECOLOGY

## 2023-10-25 ENCOUNTER — ANESTHESIA (OUTPATIENT)
Dept: SURGERY | Age: 38
End: 2023-10-25

## 2023-10-25 DIAGNOSIS — G89.18 POST-OPERATIVE PAIN: ICD-10-CM

## 2023-10-25 DIAGNOSIS — D62 ACUTE BLOOD LOSS ANEMIA: ICD-10-CM

## 2023-10-25 DIAGNOSIS — D25.1 INTRAMURAL LEIOMYOMA OF UTERUS: ICD-10-CM

## 2023-10-25 DIAGNOSIS — Z98.890 S/P MYOMECTOMY: Primary | ICD-10-CM

## 2023-10-25 LAB
ABO + RH BLD: NORMAL
B-HCG UR QL: NEGATIVE
BLD GP AB INVEST PLASRBC-IMP: NORMAL
BLD GP AB SCN SERPL QL GEL: POSITIVE
BLOOD EXPIRATION DATE: NORMAL
CROSSMATCH RESULT: NORMAL
DISPENSE STATUS: NORMAL
INTERNAL PROCEDURAL CONTROLS ACCEPTABLE: YES
ISBT BLOOD TYPE: 5100
ISBT BLOOD TYPE: 9500
ISBT BLOOD TYPE: 9500
ISSUE DATE/TIME: NORMAL
PRODUCT CODE: NORMAL
PRODUCT DESCRIPTION: NORMAL
PRODUCT ID: NORMAL
TEST LOT EXPIRATION DATE: NORMAL
TEST LOT NUMBER: NORMAL
TRANSFUSION SERVICES SAMPLE TRANSFER: NORMAL
TYPE AND SCREEN EXPIRATION DATE: NORMAL
UNIT BLOOD TYPE: NORMAL
UNIT NUMBER: NORMAL

## 2023-10-25 PROCEDURE — 88305 TISSUE EXAM BY PATHOLOGIST: CPT | Performed by: OBSTETRICS & GYNECOLOGY

## 2023-10-25 PROCEDURE — 86922 COMPATIBILITY TEST ANTIGLOB: CPT

## 2023-10-25 PROCEDURE — 13000098 HB GENERAL ROBOTIC CASE S/U + 1ST 15 MIN: Performed by: OBSTETRICS & GYNECOLOGY

## 2023-10-25 PROCEDURE — 10002803 HB RX 637: Performed by: INTERNAL MEDICINE

## 2023-10-25 PROCEDURE — 86920 COMPATIBILITY TEST SPIN: CPT

## 2023-10-25 PROCEDURE — 13000003 HB ANESTHESIA  GENERAL EA ADD MINUTE: Performed by: OBSTETRICS & GYNECOLOGY

## 2023-10-25 PROCEDURE — 96374 THER/PROPH/DIAG INJ IV PUSH: CPT

## 2023-10-25 PROCEDURE — 10002807 HB RX 258

## 2023-10-25 PROCEDURE — 10002801 HB RX 250 W/O HCPCS

## 2023-10-25 PROCEDURE — P9016 RBC LEUKOCYTES REDUCED: HCPCS

## 2023-10-25 PROCEDURE — 10006023 HB SUPPLY 272: Performed by: OBSTETRICS & GYNECOLOGY

## 2023-10-25 PROCEDURE — G0378 HOSPITAL OBSERVATION PER HR: HCPCS

## 2023-10-25 PROCEDURE — 10002800 HB RX 250 W HCPCS

## 2023-10-25 PROCEDURE — 86902 BLOOD TYPE ANTIGEN DONOR EA: CPT

## 2023-10-25 PROCEDURE — 86850 RBC ANTIBODY SCREEN: CPT

## 2023-10-25 PROCEDURE — 10002803 HB RX 637: Performed by: OBSTETRICS & GYNECOLOGY

## 2023-10-25 PROCEDURE — 99223 1ST HOSP IP/OBS HIGH 75: CPT | Performed by: INTERNAL MEDICINE

## 2023-10-25 PROCEDURE — 10004452 HB PACU ADDL 30 MINUTES: Performed by: OBSTETRICS & GYNECOLOGY

## 2023-10-25 PROCEDURE — 10002807 HB RX 258: Performed by: OBSTETRICS & GYNECOLOGY

## 2023-10-25 PROCEDURE — 10002803 HB RX 637

## 2023-10-25 PROCEDURE — 10004451 HB PACU RECOVERY 1ST 30 MINUTES: Performed by: OBSTETRICS & GYNECOLOGY

## 2023-10-25 PROCEDURE — 13000002 HB ANESTHESIA  GENERAL  S/U + 1ST 15 MIN: Performed by: OBSTETRICS & GYNECOLOGY

## 2023-10-25 PROCEDURE — 13000099 HB GENERAL ROBOTIC CASE EA ADD MINUTE: Performed by: OBSTETRICS & GYNECOLOGY

## 2023-10-25 PROCEDURE — 10002800 HB RX 250 W HCPCS: Performed by: OBSTETRICS & GYNECOLOGY

## 2023-10-25 PROCEDURE — 86905 BLOOD TYPING RBC ANTIGENS: CPT

## 2023-10-25 PROCEDURE — 36415 COLL VENOUS BLD VENIPUNCTURE: CPT

## 2023-10-25 PROCEDURE — 86870 RBC ANTIBODY IDENTIFICATION: CPT

## 2023-10-25 PROCEDURE — 81025 URINE PREGNANCY TEST: CPT

## 2023-10-25 RX ORDER — POLYETHYLENE GLYCOL 3350 17 G/17G
17 POWDER, FOR SOLUTION ORAL DAILY
Status: DISCONTINUED | OUTPATIENT
Start: 2023-10-26 | End: 2023-10-27 | Stop reason: HOSPADM

## 2023-10-25 RX ORDER — 0.9 % SODIUM CHLORIDE 0.9 %
2 VIAL (ML) INJECTION EVERY 12 HOURS SCHEDULED
Status: DISCONTINUED | OUTPATIENT
Start: 2023-10-25 | End: 2023-10-25 | Stop reason: HOSPADM

## 2023-10-25 RX ORDER — FERROUS SULFATE 325(65) MG
325 TABLET ORAL 2 TIMES DAILY WITH MEALS
Status: DISCONTINUED | OUTPATIENT
Start: 2023-10-25 | End: 2023-10-27 | Stop reason: HOSPADM

## 2023-10-25 RX ORDER — ONDANSETRON 4 MG/1
4 TABLET, ORALLY DISINTEGRATING ORAL EVERY 12 HOURS PRN
Status: DISCONTINUED | OUTPATIENT
Start: 2023-10-25 | End: 2023-10-27 | Stop reason: HOSPADM

## 2023-10-25 RX ORDER — ROCURONIUM BROMIDE 10 MG/ML
INJECTION, SOLUTION INTRAVENOUS PRN
Status: DISCONTINUED | OUTPATIENT
Start: 2023-10-25 | End: 2023-10-25

## 2023-10-25 RX ORDER — DEXAMETHASONE SODIUM PHOSPHATE 4 MG/ML
INJECTION, SOLUTION INTRA-ARTICULAR; INTRALESIONAL; INTRAMUSCULAR; INTRAVENOUS; SOFT TISSUE PRN
Status: DISCONTINUED | OUTPATIENT
Start: 2023-10-25 | End: 2023-10-25

## 2023-10-25 RX ORDER — AMOXICILLIN 250 MG
1 CAPSULE ORAL 2 TIMES DAILY PRN
Status: DISCONTINUED | OUTPATIENT
Start: 2023-10-25 | End: 2023-10-27 | Stop reason: HOSPADM

## 2023-10-25 RX ORDER — LIDOCAINE HYDROCHLORIDE 10 MG/ML
5-10 INJECTION, SOLUTION EPIDURAL; INFILTRATION; INTRACAUDAL; PERINEURAL PRN
Status: DISCONTINUED | OUTPATIENT
Start: 2023-10-25 | End: 2023-10-25 | Stop reason: HOSPADM

## 2023-10-25 RX ORDER — GLYCOPYRROLATE 0.2 MG/ML
INJECTION, SOLUTION INTRAMUSCULAR; INTRAVENOUS PRN
Status: DISCONTINUED | OUTPATIENT
Start: 2023-10-25 | End: 2023-10-25

## 2023-10-25 RX ORDER — SODIUM CHLORIDE, SODIUM LACTATE, POTASSIUM CHLORIDE, CALCIUM CHLORIDE 600; 310; 30; 20 MG/100ML; MG/100ML; MG/100ML; MG/100ML
INJECTION, SOLUTION INTRAVENOUS CONTINUOUS
Status: DISCONTINUED | OUTPATIENT
Start: 2023-10-25 | End: 2023-10-27 | Stop reason: HOSPADM

## 2023-10-25 RX ORDER — ONDANSETRON 2 MG/ML
INJECTION INTRAMUSCULAR; INTRAVENOUS PRN
Status: DISCONTINUED | OUTPATIENT
Start: 2023-10-25 | End: 2023-10-25

## 2023-10-25 RX ORDER — HUMAN INSULIN 100 [IU]/ML
INJECTION, SOLUTION SUBCUTANEOUS
Status: DISCONTINUED | OUTPATIENT
Start: 2023-10-25 | End: 2023-10-25 | Stop reason: HOSPADM

## 2023-10-25 RX ORDER — NEOSTIGMINE METHYLSULFATE 4 MG/4 ML
SYRINGE (ML) INTRAVENOUS PRN
Status: DISCONTINUED | OUTPATIENT
Start: 2023-10-25 | End: 2023-10-25

## 2023-10-25 RX ORDER — SODIUM CHLORIDE, SODIUM LACTATE, POTASSIUM CHLORIDE, CALCIUM CHLORIDE 600; 310; 30; 20 MG/100ML; MG/100ML; MG/100ML; MG/100ML
INJECTION, SOLUTION INTRAVENOUS CONTINUOUS
Status: DISCONTINUED | OUTPATIENT
Start: 2023-10-25 | End: 2023-10-25 | Stop reason: HOSPADM

## 2023-10-25 RX ORDER — HYDROCODONE BITARTRATE AND ACETAMINOPHEN 5; 325 MG/1; MG/1
1 TABLET ORAL EVERY 4 HOURS PRN
Status: DISCONTINUED | OUTPATIENT
Start: 2023-10-25 | End: 2023-10-26

## 2023-10-25 RX ORDER — ONDANSETRON 2 MG/ML
4 INJECTION INTRAMUSCULAR; INTRAVENOUS
Status: ACTIVE | OUTPATIENT
Start: 2023-10-25 | End: 2023-10-25

## 2023-10-25 RX ORDER — BUPIVACAINE HYDROCHLORIDE 2.5 MG/ML
INJECTION, SOLUTION EPIDURAL; INFILTRATION; INTRACAUDAL PRN
Status: DISCONTINUED | OUTPATIENT
Start: 2023-10-25 | End: 2023-10-25 | Stop reason: HOSPADM

## 2023-10-25 RX ORDER — ONDANSETRON 2 MG/ML
4 INJECTION INTRAMUSCULAR; INTRAVENOUS EVERY 12 HOURS PRN
Status: DISCONTINUED | OUTPATIENT
Start: 2023-10-25 | End: 2023-10-27 | Stop reason: HOSPADM

## 2023-10-25 RX ORDER — MIDAZOLAM HYDROCHLORIDE 1 MG/ML
INJECTION, SOLUTION INTRAMUSCULAR; INTRAVENOUS PRN
Status: DISCONTINUED | OUTPATIENT
Start: 2023-10-25 | End: 2023-10-25

## 2023-10-25 RX ORDER — FAMOTIDINE 20 MG/1
20 TABLET, FILM COATED ORAL EVERY 12 HOURS SCHEDULED
Status: DISCONTINUED | OUTPATIENT
Start: 2023-10-25 | End: 2023-10-27 | Stop reason: HOSPADM

## 2023-10-25 RX ORDER — METOCLOPRAMIDE HYDROCHLORIDE 5 MG/ML
5 INJECTION INTRAMUSCULAR; INTRAVENOUS
Status: ACTIVE | OUTPATIENT
Start: 2023-10-25 | End: 2023-10-25

## 2023-10-25 RX ORDER — PROPOFOL 10 MG/ML
INJECTION, EMULSION INTRAVENOUS PRN
Status: DISCONTINUED | OUTPATIENT
Start: 2023-10-25 | End: 2023-10-25

## 2023-10-25 RX ORDER — LIDOCAINE HYDROCHLORIDE 20 MG/ML
INJECTION, SOLUTION INFILTRATION; PERINEURAL PRN
Status: DISCONTINUED | OUTPATIENT
Start: 2023-10-25 | End: 2023-10-25

## 2023-10-25 RX ORDER — DEXTROSE MONOHYDRATE 25 G/50ML
25 INJECTION, SOLUTION INTRAVENOUS PRN
Status: DISCONTINUED | OUTPATIENT
Start: 2023-10-25 | End: 2023-10-25 | Stop reason: HOSPADM

## 2023-10-25 RX ORDER — METOCLOPRAMIDE HYDROCHLORIDE 5 MG/ML
10 INJECTION INTRAMUSCULAR; INTRAVENOUS EVERY 6 HOURS PRN
Status: DISCONTINUED | OUTPATIENT
Start: 2023-10-25 | End: 2023-10-27 | Stop reason: HOSPADM

## 2023-10-25 RX ORDER — SODIUM CHLORIDE 9 MG/ML
INJECTION, SOLUTION INTRAVENOUS CONTINUOUS PRN
Status: DISCONTINUED | OUTPATIENT
Start: 2023-10-25 | End: 2023-10-25

## 2023-10-25 RX ORDER — METOCLOPRAMIDE 10 MG/1
10 TABLET ORAL EVERY 6 HOURS PRN
Status: DISCONTINUED | OUTPATIENT
Start: 2023-10-25 | End: 2023-10-27 | Stop reason: HOSPADM

## 2023-10-25 RX ORDER — HYDRALAZINE HYDROCHLORIDE 20 MG/ML
5 INJECTION INTRAMUSCULAR; INTRAVENOUS EVERY 10 MIN PRN
Status: DISCONTINUED | OUTPATIENT
Start: 2023-10-25 | End: 2023-10-25 | Stop reason: HOSPADM

## 2023-10-25 RX ORDER — FAMOTIDINE 20 MG/1
20 TABLET, FILM COATED ORAL
Status: COMPLETED | OUTPATIENT
Start: 2023-10-25 | End: 2023-10-25

## 2023-10-25 RX ADMIN — CEFAZOLIN SODIUM 2000 MG: 300 INJECTION, POWDER, LYOPHILIZED, FOR SOLUTION INTRAVENOUS at 10:55

## 2023-10-25 RX ADMIN — FENTANYL CITRATE 50 MCG: 50 INJECTION INTRAMUSCULAR; INTRAVENOUS at 11:58

## 2023-10-25 RX ADMIN — HYDROMORPHONE HYDROCHLORIDE 0.4 MG: 1 INJECTION, SOLUTION INTRAMUSCULAR; INTRAVENOUS; SUBCUTANEOUS at 11:56

## 2023-10-25 RX ADMIN — HYDROCODONE BITARTRATE AND ACETAMINOPHEN 1 TABLET: 5; 325 TABLET ORAL at 21:44

## 2023-10-25 RX ADMIN — DEXAMETHASONE SODIUM PHOSPHATE 4 MG: 4 INJECTION INTRA-ARTICULAR; INTRALESIONAL; INTRAMUSCULAR; INTRAVENOUS; SOFT TISSUE at 08:04

## 2023-10-25 RX ADMIN — FENTANYL CITRATE 50 MCG: 50 INJECTION INTRAMUSCULAR; INTRAVENOUS at 08:11

## 2023-10-25 RX ADMIN — LIDOCAINE HYDROCHLORIDE 3 ML: 20 INJECTION, SOLUTION INFILTRATION; PERINEURAL at 07:46

## 2023-10-25 RX ADMIN — CEFAZOLIN SODIUM 2000 MG: 300 INJECTION, POWDER, LYOPHILIZED, FOR SOLUTION INTRAVENOUS at 08:04

## 2023-10-25 RX ADMIN — GLYCOPYRROLATE 0.2 MG: 0.2 INJECTION, SOLUTION INTRAMUSCULAR; INTRAVENOUS at 07:44

## 2023-10-25 RX ADMIN — SODIUM CHLORIDE, POTASSIUM CHLORIDE, SODIUM LACTATE AND CALCIUM CHLORIDE: 600; 310; 30; 20 INJECTION, SOLUTION INTRAVENOUS at 06:32

## 2023-10-25 RX ADMIN — HYDROMORPHONE HYDROCHLORIDE 0.4 MG: 1 INJECTION, SOLUTION INTRAMUSCULAR; INTRAVENOUS; SUBCUTANEOUS at 12:06

## 2023-10-25 RX ADMIN — FAMOTIDINE 20 MG: 20 TABLET, FILM COATED ORAL at 06:32

## 2023-10-25 RX ADMIN — FENTANYL CITRATE 50 MCG: 50 INJECTION INTRAMUSCULAR; INTRAVENOUS at 07:44

## 2023-10-25 RX ADMIN — ONDANSETRON 4 MG: 2 INJECTION INTRAMUSCULAR; INTRAVENOUS at 08:04

## 2023-10-25 RX ADMIN — FENTANYL CITRATE 50 MCG: 50 INJECTION INTRAMUSCULAR; INTRAVENOUS at 10:16

## 2023-10-25 RX ADMIN — IBUPROFEN 600 MG: 400 TABLET ORAL at 13:22

## 2023-10-25 RX ADMIN — HYDROMORPHONE HYDROCHLORIDE 0.2 MG: 1 INJECTION, SOLUTION INTRAMUSCULAR; INTRAVENOUS; SUBCUTANEOUS at 12:16

## 2023-10-25 RX ADMIN — FENTANYL CITRATE 50 MCG: 50 INJECTION INTRAMUSCULAR; INTRAVENOUS at 12:24

## 2023-10-25 RX ADMIN — ROCURONIUM BROMIDE 30 MG: 10 INJECTION INTRAVENOUS at 08:30

## 2023-10-25 RX ADMIN — HYDROCODONE BITARTRATE AND ACETAMINOPHEN 1 TABLET: 5; 325 TABLET ORAL at 15:50

## 2023-10-25 RX ADMIN — IBUPROFEN 600 MG: 400 TABLET ORAL at 21:11

## 2023-10-25 RX ADMIN — PROPOFOL 250 MG: 10 INJECTION, EMULSION INTRAVENOUS at 07:46

## 2023-10-25 RX ADMIN — FERROUS SULFATE TAB 325 MG (65 MG ELEMENTAL FE) 325 MG: 325 (65 FE) TAB at 17:00

## 2023-10-25 RX ADMIN — MIDAZOLAM HYDROCHLORIDE 2 MG: 1 INJECTION, SOLUTION INTRAMUSCULAR; INTRAVENOUS at 07:44

## 2023-10-25 RX ADMIN — ROCURONIUM BROMIDE 50 MG: 10 INJECTION INTRAVENOUS at 07:46

## 2023-10-25 RX ADMIN — SODIUM CHLORIDE: 9 INJECTION, SOLUTION INTRAVENOUS at 10:20

## 2023-10-25 RX ADMIN — GLYCOPYRROLATE 0.8 MG: 0.2 INJECTION, SOLUTION INTRAMUSCULAR; INTRAVENOUS at 11:22

## 2023-10-25 RX ADMIN — SODIUM CHLORIDE, POTASSIUM CHLORIDE, SODIUM LACTATE AND CALCIUM CHLORIDE: 600; 310; 30; 20 INJECTION, SOLUTION INTRAVENOUS at 09:41

## 2023-10-25 RX ADMIN — FENTANYL CITRATE 50 MCG: 50 INJECTION INTRAMUSCULAR; INTRAVENOUS at 09:58

## 2023-10-25 RX ADMIN — FENTANYL CITRATE 50 MCG: 50 INJECTION INTRAMUSCULAR; INTRAVENOUS at 12:29

## 2023-10-25 RX ADMIN — FAMOTIDINE 20 MG: 20 TABLET, FILM COATED ORAL at 21:11

## 2023-10-25 RX ADMIN — MORPHINE SULFATE 2 MG: 2 INJECTION, SOLUTION INTRAMUSCULAR; INTRAVENOUS at 17:06

## 2023-10-25 RX ADMIN — MORPHINE SULFATE 2 MG: 2 INJECTION, SOLUTION INTRAMUSCULAR; INTRAVENOUS at 23:37

## 2023-10-25 RX ADMIN — SODIUM CHLORIDE, POTASSIUM CHLORIDE, SODIUM LACTATE AND CALCIUM CHLORIDE: 600; 310; 30; 20 INJECTION, SOLUTION INTRAVENOUS at 21:48

## 2023-10-25 RX ADMIN — ROCURONIUM BROMIDE 20 MG: 10 INJECTION INTRAVENOUS at 09:55

## 2023-10-25 RX ADMIN — FENTANYL CITRATE 50 MCG: 50 INJECTION INTRAMUSCULAR; INTRAVENOUS at 11:45

## 2023-10-25 RX ADMIN — Medication 4 MG: at 11:22

## 2023-10-25 SDOH — ECONOMIC STABILITY: HOUSING INSECURITY: ARE YOU WORRIED ABOUT LOSING YOUR HOUSING?: NO

## 2023-10-25 SDOH — ECONOMIC STABILITY: FOOD INSECURITY: HOW OFTEN IN THE PAST 12 MONTHS WERE YOU WORRIED OR STRESSED ABOUT HAVING ENOUGH MONEY TO BUY NUTRITIOUS MEALS?: NEVER

## 2023-10-25 SDOH — SOCIAL STABILITY: SOCIAL NETWORK: SUPPORT SYSTEMS: FAMILY MEMBERS;SPOUSE

## 2023-10-25 SDOH — HEALTH STABILITY: GENERAL: BECAUSE OF A PHYSICAL, MENTAL, OR EMOTIONAL CONDITION, DO YOU HAVE DIFFICULTY DOING ERRANDS ALONE?: NO

## 2023-10-25 SDOH — ECONOMIC STABILITY: HOUSING INSECURITY: WHAT IS YOUR LIVING SITUATION TODAY?: SPOUSE

## 2023-10-25 SDOH — HEALTH STABILITY: PHYSICAL HEALTH: DO YOU HAVE DIFFICULTY DRESSING OR BATHING?: NO

## 2023-10-25 SDOH — ECONOMIC STABILITY: GENERAL

## 2023-10-25 SDOH — HEALTH STABILITY: PHYSICAL HEALTH: DO YOU HAVE SERIOUS DIFFICULTY WALKING OR CLIMBING STAIRS?: NO

## 2023-10-25 SDOH — ECONOMIC STABILITY: HOUSING INSECURITY: WHAT IS YOUR LIVING SITUATION TODAY?: HOUSE

## 2023-10-25 ASSESSMENT — ACTIVITIES OF DAILY LIVING (ADL)
ADL_SCORE: 12
ADL_BEFORE_ADMISSION: INDEPENDENT
RECENT_DECLINE_ADL: NO
ADL_SHORT_OF_BREATH: NO

## 2023-10-25 ASSESSMENT — PAIN SCALES - GENERAL
PAINLEVEL_OUTOF10: 6
PAINLEVEL_OUTOF10: 8
PAINLEVEL_OUTOF10: 7
PAINLEVEL_OUTOF10: 7
PAINLEVEL_OUTOF10: 8
PAINLEVEL_OUTOF10: 8
PAINLEVEL_OUTOF10: 6
PAINLEVEL_OUTOF10: 7
PAINLEVEL_OUTOF10: 6
PAINLEVEL_OUTOF10: 7
PAINLEVEL_OUTOF10: 8
PAINLEVEL_OUTOF10: 3
PAINLEVEL_OUTOF10: 3
PAINLEVEL_OUTOF10: 8
PAINLEVEL_OUTOF10: 7
PAINLEVEL_OUTOF10: 8
PAINLEVEL_OUTOF10: 7

## 2023-10-25 ASSESSMENT — COLUMBIA-SUICIDE SEVERITY RATING SCALE - C-SSRS
6. HAVE YOU EVER DONE ANYTHING, STARTED TO DO ANYTHING, OR PREPARED TO DO ANYTHING TO END YOUR LIFE?: NO
2. HAVE YOU ACTUALLY HAD ANY THOUGHTS OF KILLING YOURSELF?: NO
1. WITHIN THE PAST MONTH, HAVE YOU WISHED YOU WERE DEAD OR WISHED YOU COULD GO TO SLEEP AND NOT WAKE UP?: NO
IS THE PATIENT ABLE TO COMPLETE C-SSRS: YES

## 2023-10-25 ASSESSMENT — PATIENT HEALTH QUESTIONNAIRE - PHQ9
CLINICAL INTERPRETATION OF PHQ2 SCORE: NO FURTHER SCREENING NEEDED
SUM OF ALL RESPONSES TO PHQ9 QUESTIONS 1 AND 2: 0
SUM OF ALL RESPONSES TO PHQ9 QUESTIONS 1 AND 2: 0
2. FEELING DOWN, DEPRESSED OR HOPELESS: NOT AT ALL
IS PATIENT ABLE TO COMPLETE PHQ2 OR PHQ9: YES
1. LITTLE INTEREST OR PLEASURE IN DOING THINGS: NOT AT ALL

## 2023-10-25 ASSESSMENT — COGNITIVE AND FUNCTIONAL STATUS - GENERAL
DO YOU HAVE SERIOUS DIFFICULTY WALKING OR CLIMBING STAIRS: NO
BECAUSE OF A PHYSICAL, MENTAL, OR EMOTIONAL CONDITION, DO YOU HAVE SERIOUS DIFFICULTY CONCENTRATING, REMEMBERING OR MAKING DECISIONS: NO
BECAUSE OF A PHYSICAL, MENTAL, OR EMOTIONAL CONDITION, DO YOU HAVE DIFFICULTY DOING ERRANDS ALONE: NO

## 2023-10-26 LAB
ANION GAP SERPL CALC-SCNC: 9 MMOL/L (ref 7–19)
ASR DISCLAIMER: NORMAL
BASOPHILS # BLD: 0 K/MCL (ref 0–0.3)
BASOPHILS NFR BLD: 0 %
BUN SERPL-MCNC: 8 MG/DL (ref 6–20)
BUN/CREAT SERPL: 14 (ref 7–25)
CALCIUM SERPL-MCNC: 8.2 MG/DL (ref 8.4–10.2)
CASE RPRT: NORMAL
CHLORIDE SERPL-SCNC: 109 MMOL/L (ref 97–110)
CLINICAL INFO: NORMAL
CO2 SERPL-SCNC: 26 MMOL/L (ref 21–32)
CREAT SERPL-MCNC: 0.56 MG/DL (ref 0.51–0.95)
DEPRECATED RDW RBC: 46.5 FL (ref 39–50)
EGFRCR SERPLBLD CKD-EPI 2021: >90 ML/MIN/{1.73_M2}
EOSINOPHIL # BLD: 0 K/MCL (ref 0–0.5)
EOSINOPHIL NFR BLD: 0 %
ERYTHROCYTE [DISTWIDTH] IN BLOOD: 15.3 % (ref 11–15)
FASTING DURATION TIME PATIENT: ABNORMAL H
GLUCOSE SERPL-MCNC: 116 MG/DL (ref 70–99)
HCT VFR BLD CALC: 27.9 % (ref 36–46.5)
HGB BLD-MCNC: 8.5 G/DL (ref 12–15.5)
IMM GRANULOCYTES # BLD AUTO: 0.1 K/MCL (ref 0–0.2)
IMM GRANULOCYTES # BLD: 1 %
LYMPHOCYTES # BLD: 2.3 K/MCL (ref 1–4.8)
LYMPHOCYTES NFR BLD: 20 %
MCH RBC QN AUTO: 25.1 PG (ref 26–34)
MCHC RBC AUTO-ENTMCNC: 30.5 G/DL (ref 32–36.5)
MCV RBC AUTO: 82.5 FL (ref 78–100)
MONOCYTES # BLD: 1 K/MCL (ref 0.3–0.9)
MONOCYTES NFR BLD: 9 %
NEUTROPHILS # BLD: 8.2 K/MCL (ref 1.8–7.7)
NEUTROPHILS NFR BLD: 70 %
NRBC BLD MANUAL-RTO: 0 /100 WBC
PATH REPORT.FINAL DX SPEC: NORMAL
PATH REPORT.GROSS SPEC: NORMAL
PLATELET # BLD AUTO: 325 K/MCL (ref 140–450)
POTASSIUM SERPL-SCNC: 3.8 MMOL/L (ref 3.4–5.1)
RAINBOW EXTRA TUBES HOLD SPECIMEN: NORMAL
RBC # BLD: 3.38 MIL/MCL (ref 4–5.2)
SODIUM SERPL-SCNC: 140 MMOL/L (ref 135–145)
WBC # BLD: 11.6 K/MCL (ref 4.2–11)

## 2023-10-26 PROCEDURE — 10002800 HB RX 250 W HCPCS: Performed by: OBSTETRICS & GYNECOLOGY

## 2023-10-26 PROCEDURE — 36415 COLL VENOUS BLD VENIPUNCTURE: CPT | Performed by: INTERNAL MEDICINE

## 2023-10-26 PROCEDURE — 10002800 HB RX 250 W HCPCS: Performed by: INTERNAL MEDICINE

## 2023-10-26 PROCEDURE — 99232 SBSQ HOSP IP/OBS MODERATE 35: CPT | Performed by: INTERNAL MEDICINE

## 2023-10-26 PROCEDURE — 10002803 HB RX 637: Performed by: OBSTETRICS & GYNECOLOGY

## 2023-10-26 PROCEDURE — 10002807 HB RX 258: Performed by: OBSTETRICS & GYNECOLOGY

## 2023-10-26 PROCEDURE — G0378 HOSPITAL OBSERVATION PER HR: HCPCS

## 2023-10-26 PROCEDURE — 96375 TX/PRO/DX INJ NEW DRUG ADDON: CPT

## 2023-10-26 PROCEDURE — 10002803 HB RX 637: Performed by: INTERNAL MEDICINE

## 2023-10-26 PROCEDURE — 85025 COMPLETE CBC W/AUTO DIFF WBC: CPT | Performed by: INTERNAL MEDICINE

## 2023-10-26 PROCEDURE — 96376 TX/PRO/DX INJ SAME DRUG ADON: CPT

## 2023-10-26 PROCEDURE — 80048 BASIC METABOLIC PNL TOTAL CA: CPT | Performed by: INTERNAL MEDICINE

## 2023-10-26 RX ORDER — KETOROLAC TROMETHAMINE 15 MG/ML
15 INJECTION, SOLUTION INTRAMUSCULAR; INTRAVENOUS EVERY 6 HOURS PRN
Status: DISCONTINUED | OUTPATIENT
Start: 2023-10-26 | End: 2023-10-27 | Stop reason: HOSPADM

## 2023-10-26 RX ORDER — HYDROCODONE BITARTRATE AND ACETAMINOPHEN 5; 325 MG/1; MG/1
2 TABLET ORAL EVERY 4 HOURS PRN
Status: DISCONTINUED | OUTPATIENT
Start: 2023-10-26 | End: 2023-10-27 | Stop reason: HOSPADM

## 2023-10-26 RX ADMIN — FAMOTIDINE 20 MG: 20 TABLET, FILM COATED ORAL at 21:14

## 2023-10-26 RX ADMIN — FERROUS SULFATE TAB 325 MG (65 MG ELEMENTAL FE) 325 MG: 325 (65 FE) TAB at 17:51

## 2023-10-26 RX ADMIN — SODIUM CHLORIDE, POTASSIUM CHLORIDE, SODIUM LACTATE AND CALCIUM CHLORIDE: 600; 310; 30; 20 INJECTION, SOLUTION INTRAVENOUS at 17:54

## 2023-10-26 RX ADMIN — FERROUS SULFATE TAB 325 MG (65 MG ELEMENTAL FE) 325 MG: 325 (65 FE) TAB at 08:08

## 2023-10-26 RX ADMIN — HYDROCODONE BITARTRATE AND ACETAMINOPHEN 2 TABLET: 5; 325 TABLET ORAL at 21:14

## 2023-10-26 RX ADMIN — HYDROCODONE BITARTRATE AND ACETAMINOPHEN 2 TABLET: 5; 325 TABLET ORAL at 15:55

## 2023-10-26 RX ADMIN — SODIUM CHLORIDE, POTASSIUM CHLORIDE, SODIUM LACTATE AND CALCIUM CHLORIDE: 600; 310; 30; 20 INJECTION, SOLUTION INTRAVENOUS at 07:54

## 2023-10-26 RX ADMIN — FAMOTIDINE 20 MG: 20 TABLET, FILM COATED ORAL at 08:08

## 2023-10-26 RX ADMIN — KETOROLAC TROMETHAMINE 15 MG: 15 INJECTION, SOLUTION INTRAMUSCULAR; INTRAVENOUS at 11:33

## 2023-10-26 RX ADMIN — HYDROCODONE BITARTRATE AND ACETAMINOPHEN 1 TABLET: 5; 325 TABLET ORAL at 05:42

## 2023-10-26 RX ADMIN — ONDANSETRON 4 MG: 2 INJECTION INTRAMUSCULAR; INTRAVENOUS at 15:17

## 2023-10-26 RX ADMIN — MORPHINE SULFATE 2 MG: 2 INJECTION, SOLUTION INTRAMUSCULAR; INTRAVENOUS at 08:10

## 2023-10-26 RX ADMIN — POLYETHYLENE GLYCOL (3350) 17 G: 17 POWDER, FOR SOLUTION ORAL at 08:07

## 2023-10-26 RX ADMIN — IBUPROFEN 600 MG: 400 TABLET ORAL at 23:31

## 2023-10-26 ASSESSMENT — PAIN SCALES - GENERAL
PAINLEVEL_OUTOF10: 6
PAINLEVEL_OUTOF10: 10
PAINLEVEL_OUTOF10: 4
PAINLEVEL_OUTOF10: 6
PAINLEVEL_OUTOF10: 6
PAINLEVEL_OUTOF10: 10
PAINLEVEL_OUTOF10: 10
PAINLEVEL_OUTOF10: 8
PAINLEVEL_OUTOF10: 7
PAINLEVEL_OUTOF10: 10
PAINLEVEL_OUTOF10: 7
PAINLEVEL_OUTOF10: 8

## 2023-10-26 ASSESSMENT — PAIN SCALES - WONG BAKER: WONGBAKER_NUMERICALRESPONSE: 0

## 2023-10-27 VITALS
SYSTOLIC BLOOD PRESSURE: 114 MMHG | DIASTOLIC BLOOD PRESSURE: 77 MMHG | BODY MASS INDEX: 40.89 KG/M2 | OXYGEN SATURATION: 99 % | HEART RATE: 96 BPM | TEMPERATURE: 97.7 F | HEIGHT: 66 IN | RESPIRATION RATE: 16 BRPM | WEIGHT: 254.41 LBS

## 2023-10-27 LAB
BASOPHILS # BLD: 0 K/MCL (ref 0–0.3)
BASOPHILS NFR BLD: 0 %
DEPRECATED RDW RBC: 48.4 FL (ref 39–50)
EOSINOPHIL # BLD: 0.1 K/MCL (ref 0–0.5)
EOSINOPHIL NFR BLD: 1 %
ERYTHROCYTE [DISTWIDTH] IN BLOOD: 15.8 % (ref 11–15)
GLUCOSE BLDC GLUCOMTR-MCNC: 124 MG/DL (ref 70–99)
HCT VFR BLD CALC: 24.5 % (ref 36–46.5)
HCT VFR BLD CALC: 25.7 % (ref 36–46.5)
HGB BLD-MCNC: 7.4 G/DL (ref 12–15.5)
HGB BLD-MCNC: 7.9 G/DL (ref 12–15.5)
IMM GRANULOCYTES # BLD AUTO: 0 K/MCL (ref 0–0.2)
IMM GRANULOCYTES # BLD: 1 %
LYMPHOCYTES # BLD: 2.3 K/MCL (ref 1–4.8)
LYMPHOCYTES NFR BLD: 27 %
MCH RBC QN AUTO: 25.3 PG (ref 26–34)
MCHC RBC AUTO-ENTMCNC: 30.2 G/DL (ref 32–36.5)
MCV RBC AUTO: 83.9 FL (ref 78–100)
MONOCYTES # BLD: 0.7 K/MCL (ref 0.3–0.9)
MONOCYTES NFR BLD: 8 %
NEUTROPHILS # BLD: 5.3 K/MCL (ref 1.8–7.7)
NEUTROPHILS NFR BLD: 63 %
NRBC BLD MANUAL-RTO: 0 /100 WBC
PLATELET # BLD AUTO: 293 K/MCL (ref 140–450)
RBC # BLD: 2.92 MIL/MCL (ref 4–5.2)
WBC # BLD: 8.4 K/MCL (ref 4.2–11)

## 2023-10-27 PROCEDURE — 10002803 HB RX 637: Performed by: OBSTETRICS & GYNECOLOGY

## 2023-10-27 PROCEDURE — 82962 GLUCOSE BLOOD TEST: CPT

## 2023-10-27 PROCEDURE — G0378 HOSPITAL OBSERVATION PER HR: HCPCS

## 2023-10-27 PROCEDURE — 85018 HEMOGLOBIN: CPT | Performed by: INTERNAL MEDICINE

## 2023-10-27 PROCEDURE — 96361 HYDRATE IV INFUSION ADD-ON: CPT

## 2023-10-27 PROCEDURE — 85025 COMPLETE CBC W/AUTO DIFF WBC: CPT | Performed by: INTERNAL MEDICINE

## 2023-10-27 PROCEDURE — 10002803 HB RX 637: Performed by: INTERNAL MEDICINE

## 2023-10-27 PROCEDURE — S0310 HOSPITALIST VISIT: HCPCS | Performed by: INTERNAL MEDICINE

## 2023-10-27 PROCEDURE — 36415 COLL VENOUS BLD VENIPUNCTURE: CPT | Performed by: INTERNAL MEDICINE

## 2023-10-27 PROCEDURE — 99239 HOSP IP/OBS DSCHRG MGMT >30: CPT | Performed by: INTERNAL MEDICINE

## 2023-10-27 PROCEDURE — 10002807 HB RX 258: Performed by: OBSTETRICS & GYNECOLOGY

## 2023-10-27 RX ORDER — HYDROCODONE BITARTRATE AND ACETAMINOPHEN 5; 325 MG/1; MG/1
1 TABLET ORAL EVERY 6 HOURS PRN
Qty: 15 TABLET | Refills: 0 | Status: SHIPPED | OUTPATIENT
Start: 2023-10-27

## 2023-10-27 RX ORDER — IBUPROFEN 600 MG/1
600 TABLET ORAL EVERY 6 HOURS PRN
Qty: 30 TABLET | Refills: 0 | Status: SHIPPED | OUTPATIENT
Start: 2023-10-27

## 2023-10-27 RX ORDER — FAMOTIDINE 20 MG/1
10 TABLET, FILM COATED ORAL 2 TIMES DAILY
Status: SHIPPED | COMMUNITY
Start: 2023-10-27

## 2023-10-27 RX ADMIN — HYDROCODONE BITARTRATE AND ACETAMINOPHEN 2 TABLET: 5; 325 TABLET ORAL at 16:06

## 2023-10-27 RX ADMIN — SODIUM CHLORIDE, POTASSIUM CHLORIDE, SODIUM LACTATE AND CALCIUM CHLORIDE: 600; 310; 30; 20 INJECTION, SOLUTION INTRAVENOUS at 03:48

## 2023-10-27 RX ADMIN — IBUPROFEN 600 MG: 400 TABLET ORAL at 13:42

## 2023-10-27 RX ADMIN — POLYETHYLENE GLYCOL (3350) 17 G: 17 POWDER, FOR SOLUTION ORAL at 08:55

## 2023-10-27 RX ADMIN — HYDROCODONE BITARTRATE AND ACETAMINOPHEN 2 TABLET: 5; 325 TABLET ORAL at 11:34

## 2023-10-27 RX ADMIN — HYDROCODONE BITARTRATE AND ACETAMINOPHEN 2 TABLET: 5; 325 TABLET ORAL at 07:31

## 2023-10-27 RX ADMIN — SODIUM CHLORIDE, POTASSIUM CHLORIDE, SODIUM LACTATE AND CALCIUM CHLORIDE: 600; 310; 30; 20 INJECTION, SOLUTION INTRAVENOUS at 12:29

## 2023-10-27 RX ADMIN — FERROUS SULFATE TAB 325 MG (65 MG ELEMENTAL FE) 325 MG: 325 (65 FE) TAB at 08:55

## 2023-10-27 RX ADMIN — HYDROCODONE BITARTRATE AND ACETAMINOPHEN 2 TABLET: 5; 325 TABLET ORAL at 02:15

## 2023-10-27 RX ADMIN — FAMOTIDINE 20 MG: 20 TABLET, FILM COATED ORAL at 08:55

## 2023-10-27 ASSESSMENT — PAIN SCALES - GENERAL
PAINLEVEL_OUTOF10: 7
PAINLEVEL_OUTOF10: 7
PAINLEVEL_OUTOF10: 6
PAINLEVEL_OUTOF10: 5
PAINLEVEL_OUTOF10: 8
PAINLEVEL_OUTOF10: 8
PAINLEVEL_OUTOF10: 6
PAINLEVEL_OUTOF10: 6
PAINLEVEL_OUTOF10: 7

## 2023-11-07 ENCOUNTER — APPOINTMENT (OUTPATIENT)
Dept: CT IMAGING | Age: 38
End: 2023-11-07
Attending: EMERGENCY MEDICINE
Payer: COMMERCIAL

## 2023-11-07 ENCOUNTER — HOSPITAL ENCOUNTER (EMERGENCY)
Age: 38
Discharge: HOME | End: 2023-11-07
Attending: EMERGENCY MEDICINE
Payer: COMMERCIAL

## 2023-11-07 VITALS
RESPIRATION RATE: 16 BRPM | OXYGEN SATURATION: 97 % | DIASTOLIC BLOOD PRESSURE: 77 MMHG | HEIGHT: 66 IN | HEART RATE: 72 BPM | WEIGHT: 260 LBS | BODY MASS INDEX: 41.78 KG/M2 | SYSTOLIC BLOOD PRESSURE: 135 MMHG | TEMPERATURE: 98 F

## 2023-11-07 DIAGNOSIS — T81.49XA POST-OPERATIVE WOUND ABSCESS: Primary | ICD-10-CM

## 2023-11-07 DIAGNOSIS — G93.5 CHIARI MALFORMATION TYPE I (HCC): ICD-10-CM

## 2023-11-07 LAB
ALBUMIN SERPL-MCNC: 3.1 G/DL (ref 3.4–5)
ALBUMIN/GLOB SERPL: 0.7 {RATIO} (ref 1–2)
ALP LIVER SERPL-CCNC: 103 U/L
ALT SERPL-CCNC: 46 U/L
ANION GAP SERPL CALC-SCNC: 4 MMOL/L (ref 0–18)
AST SERPL-CCNC: 40 U/L (ref 15–37)
B-HCG UR QL: NEGATIVE
BASOPHILS # BLD AUTO: 0.05 X10(3) UL (ref 0–0.2)
BASOPHILS NFR BLD AUTO: 0.5 %
BILIRUB SERPL-MCNC: 0.3 MG/DL (ref 0.1–2)
BILIRUB UR QL STRIP.AUTO: NEGATIVE
BUN BLD-MCNC: 14 MG/DL (ref 9–23)
CALCIUM BLD-MCNC: 8.7 MG/DL (ref 8.5–10.1)
CHLORIDE SERPL-SCNC: 105 MMOL/L (ref 98–112)
CLARITY UR REFRACT.AUTO: CLEAR
CO2 SERPL-SCNC: 27 MMOL/L (ref 21–32)
COLOR UR AUTO: YELLOW
CREAT BLD-MCNC: 0.74 MG/DL
EGFRCR SERPLBLD CKD-EPI 2021: 105 ML/MIN/1.73M2 (ref 60–?)
EOSINOPHIL # BLD AUTO: 0.12 X10(3) UL (ref 0–0.7)
EOSINOPHIL NFR BLD AUTO: 1.1 %
ERYTHROCYTE [DISTWIDTH] IN BLOOD BY AUTOMATED COUNT: 16.2 %
GLOBULIN PLAS-MCNC: 4.3 G/DL (ref 2.8–4.4)
GLUCOSE BLD-MCNC: 102 MG/DL (ref 70–99)
GLUCOSE UR STRIP.AUTO-MCNC: NEGATIVE MG/DL
HCT VFR BLD AUTO: 30.4 %
HGB BLD-MCNC: 9.6 G/DL
IMM GRANULOCYTES # BLD AUTO: 0.05 X10(3) UL (ref 0–1)
IMM GRANULOCYTES NFR BLD: 0.5 %
KETONES UR STRIP.AUTO-MCNC: NEGATIVE MG/DL
LEUKOCYTE ESTERASE UR QL STRIP.AUTO: NEGATIVE
LYMPHOCYTES # BLD AUTO: 2.24 X10(3) UL (ref 1–4)
LYMPHOCYTES NFR BLD AUTO: 21.2 %
MCH RBC QN AUTO: 25.6 PG (ref 26–34)
MCHC RBC AUTO-ENTMCNC: 31.6 G/DL (ref 31–37)
MCV RBC AUTO: 81.1 FL
MONOCYTES # BLD AUTO: 0.84 X10(3) UL (ref 0.1–1)
MONOCYTES NFR BLD AUTO: 7.9 %
NEUTROPHILS # BLD AUTO: 7.27 X10 (3) UL (ref 1.5–7.7)
NEUTROPHILS # BLD AUTO: 7.27 X10(3) UL (ref 1.5–7.7)
NEUTROPHILS NFR BLD AUTO: 68.8 %
NITRITE UR QL STRIP.AUTO: NEGATIVE
OSMOLALITY SERPL CALC.SUM OF ELEC: 283 MOSM/KG (ref 275–295)
PH UR STRIP.AUTO: 5.5 [PH] (ref 5–8)
PLATELET # BLD AUTO: 592 10(3)UL (ref 150–450)
POTASSIUM SERPL-SCNC: 3.7 MMOL/L (ref 3.5–5.1)
PROT SERPL-MCNC: 7.4 G/DL (ref 6.4–8.2)
PROT UR STRIP.AUTO-MCNC: NEGATIVE MG/DL
RBC # BLD AUTO: 3.75 X10(6)UL
RBC UR QL AUTO: NEGATIVE
SODIUM SERPL-SCNC: 136 MMOL/L (ref 136–145)
SP GR UR STRIP.AUTO: 1.02 (ref 1–1.03)
UROBILINOGEN UR STRIP.AUTO-MCNC: 0.2 MG/DL
WBC # BLD AUTO: 10.6 X10(3) UL (ref 4–11)

## 2023-11-07 PROCEDURE — 70450 CT HEAD/BRAIN W/O DYE: CPT | Performed by: EMERGENCY MEDICINE

## 2023-11-07 PROCEDURE — 96375 TX/PRO/DX INJ NEW DRUG ADDON: CPT

## 2023-11-07 PROCEDURE — 81025 URINE PREGNANCY TEST: CPT

## 2023-11-07 PROCEDURE — 85025 COMPLETE CBC W/AUTO DIFF WBC: CPT | Performed by: EMERGENCY MEDICINE

## 2023-11-07 PROCEDURE — 99284 EMERGENCY DEPT VISIT MOD MDM: CPT

## 2023-11-07 PROCEDURE — 36415 COLL VENOUS BLD VENIPUNCTURE: CPT

## 2023-11-07 PROCEDURE — 81003 URINALYSIS AUTO W/O SCOPE: CPT | Performed by: EMERGENCY MEDICINE

## 2023-11-07 PROCEDURE — 80053 COMPREHEN METABOLIC PANEL: CPT | Performed by: EMERGENCY MEDICINE

## 2023-11-07 PROCEDURE — 87040 BLOOD CULTURE FOR BACTERIA: CPT | Performed by: EMERGENCY MEDICINE

## 2023-11-07 PROCEDURE — 72125 CT NECK SPINE W/O DYE: CPT | Performed by: EMERGENCY MEDICINE

## 2023-11-07 PROCEDURE — 99285 EMERGENCY DEPT VISIT HI MDM: CPT

## 2023-11-07 PROCEDURE — 96365 THER/PROPH/DIAG IV INF INIT: CPT

## 2023-11-07 PROCEDURE — 74176 CT ABD & PELVIS W/O CONTRAST: CPT | Performed by: EMERGENCY MEDICINE

## 2023-11-07 RX ORDER — DIPHENHYDRAMINE HYDROCHLORIDE 50 MG/ML
25 INJECTION INTRAMUSCULAR; INTRAVENOUS ONCE
Status: COMPLETED | OUTPATIENT
Start: 2023-11-07 | End: 2023-11-07

## 2023-11-07 RX ORDER — OXYCODONE HYDROCHLORIDE 5 MG/1
5 CAPSULE ORAL EVERY 4 HOURS PRN
Qty: 10 CAPSULE | Refills: 0 | Status: SHIPPED | OUTPATIENT
Start: 2023-11-07 | End: 2023-11-08

## 2023-11-07 RX ORDER — NALOXONE HYDROCHLORIDE 4 MG/.1ML
4 SPRAY NASAL AS NEEDED
Qty: 1 KIT | Refills: 0 | Status: SHIPPED | OUTPATIENT
Start: 2023-11-07

## 2023-11-07 NOTE — ED INITIAL ASSESSMENT (HPI)
Pt to ed post abdominal myectomy on 10/25, today pain and drainage with strong odor x 3 days and left hand numbness.  Unknown fevers

## 2023-11-08 ENCOUNTER — TELEPHONE (OUTPATIENT)
Dept: CASE MANAGEMENT | Facility: HOSPITAL | Age: 38
End: 2023-11-08

## 2023-11-08 ENCOUNTER — HOSPITAL ENCOUNTER (EMERGENCY)
Age: 38
Discharge: HOME | End: 2023-11-08
Attending: EMERGENCY MEDICINE
Payer: COMMERCIAL

## 2023-11-08 VITALS
WEIGHT: 247 LBS | TEMPERATURE: 98 F | OXYGEN SATURATION: 98 % | SYSTOLIC BLOOD PRESSURE: 128 MMHG | DIASTOLIC BLOOD PRESSURE: 70 MMHG | RESPIRATION RATE: 20 BRPM | HEART RATE: 86 BPM | HEIGHT: 66 IN | BODY MASS INDEX: 39.7 KG/M2

## 2023-11-08 DIAGNOSIS — Z51.89 VISIT FOR WOUND CHECK: Primary | ICD-10-CM

## 2023-11-08 LAB
ALBUMIN SERPL-MCNC: 3.1 G/DL (ref 3.4–5)
ALBUMIN/GLOB SERPL: 0.7 {RATIO} (ref 1–2)
ALP LIVER SERPL-CCNC: 106 U/L
ALT SERPL-CCNC: 39 U/L
ANION GAP SERPL CALC-SCNC: 4 MMOL/L (ref 0–18)
AST SERPL-CCNC: 27 U/L (ref 15–37)
BASOPHILS # BLD AUTO: 0.06 X10(3) UL (ref 0–0.2)
BASOPHILS NFR BLD AUTO: 0.6 %
BILIRUB SERPL-MCNC: 0.2 MG/DL (ref 0.1–2)
BUN BLD-MCNC: 19 MG/DL (ref 9–23)
CALCIUM BLD-MCNC: 8.9 MG/DL (ref 8.5–10.1)
CHLORIDE SERPL-SCNC: 111 MMOL/L (ref 98–112)
CO2 SERPL-SCNC: 27 MMOL/L (ref 21–32)
CREAT BLD-MCNC: 0.86 MG/DL
EGFRCR SERPLBLD CKD-EPI 2021: 88 ML/MIN/1.73M2 (ref 60–?)
EOSINOPHIL # BLD AUTO: 0.14 X10(3) UL (ref 0–0.7)
EOSINOPHIL NFR BLD AUTO: 1.3 %
ERYTHROCYTE [DISTWIDTH] IN BLOOD BY AUTOMATED COUNT: 16.3 %
GLOBULIN PLAS-MCNC: 4.5 G/DL (ref 2.8–4.4)
GLUCOSE BLD-MCNC: 100 MG/DL (ref 70–99)
HCT VFR BLD AUTO: 30.1 %
HGB BLD-MCNC: 9.1 G/DL
IMM GRANULOCYTES # BLD AUTO: 0.05 X10(3) UL (ref 0–1)
IMM GRANULOCYTES NFR BLD: 0.5 %
LACTATE SERPL-SCNC: 0.7 MMOL/L (ref 0.4–2)
LYMPHOCYTES # BLD AUTO: 2.4 X10(3) UL (ref 1–4)
LYMPHOCYTES NFR BLD AUTO: 22.6 %
MCH RBC QN AUTO: 24.9 PG (ref 26–34)
MCHC RBC AUTO-ENTMCNC: 30.2 G/DL (ref 31–37)
MCV RBC AUTO: 82.2 FL
MONOCYTES # BLD AUTO: 0.88 X10(3) UL (ref 0.1–1)
MONOCYTES NFR BLD AUTO: 8.3 %
NEUTROPHILS # BLD AUTO: 7.11 X10 (3) UL (ref 1.5–7.7)
NEUTROPHILS # BLD AUTO: 7.11 X10(3) UL (ref 1.5–7.7)
NEUTROPHILS NFR BLD AUTO: 66.7 %
OSMOLALITY SERPL CALC.SUM OF ELEC: 296 MOSM/KG (ref 275–295)
PLATELET # BLD AUTO: 607 10(3)UL (ref 150–450)
POTASSIUM SERPL-SCNC: 3.7 MMOL/L (ref 3.5–5.1)
PROT SERPL-MCNC: 7.6 G/DL (ref 6.4–8.2)
RBC # BLD AUTO: 3.66 X10(6)UL
SODIUM SERPL-SCNC: 142 MMOL/L (ref 136–145)
WBC # BLD AUTO: 10.6 X10(3) UL (ref 4–11)

## 2023-11-08 PROCEDURE — 80053 COMPREHEN METABOLIC PANEL: CPT | Performed by: EMERGENCY MEDICINE

## 2023-11-08 PROCEDURE — 99284 EMERGENCY DEPT VISIT MOD MDM: CPT

## 2023-11-08 PROCEDURE — 96360 HYDRATION IV INFUSION INIT: CPT

## 2023-11-08 PROCEDURE — 83605 ASSAY OF LACTIC ACID: CPT | Performed by: EMERGENCY MEDICINE

## 2023-11-08 PROCEDURE — 85025 COMPLETE CBC W/AUTO DIFF WBC: CPT | Performed by: EMERGENCY MEDICINE

## 2023-11-08 PROCEDURE — 80053 COMPREHEN METABOLIC PANEL: CPT

## 2023-11-08 PROCEDURE — 85025 COMPLETE CBC W/AUTO DIFF WBC: CPT

## 2023-11-08 RX ORDER — OXYCODONE HYDROCHLORIDE 5 MG/1
5 TABLET ORAL EVERY 4 HOURS PRN
Qty: 10 TABLET | Refills: 0 | Status: SHIPPED | OUTPATIENT
Start: 2023-11-08

## 2023-11-08 RX ORDER — CEPHALEXIN 500 MG/1
500 CAPSULE ORAL ONCE
Status: COMPLETED | OUTPATIENT
Start: 2023-11-08 | End: 2023-11-08

## 2023-11-08 RX ORDER — CEPHALEXIN 500 MG/1
500 CAPSULE ORAL 3 TIMES DAILY
Qty: 21 CAPSULE | Refills: 0 | Status: SHIPPED | OUTPATIENT
Start: 2023-11-08

## 2023-11-08 NOTE — CM/SW NOTE
Patient states she was prescribed Oxycontin capsules but the pharmacy does not have the capsules. She is requesting prescription for tabs. EDMD sent new order for Oxycontin tabs to patient's pharmacy. Called patient. No answer. No identifying information on message.   Asked patient to return call to STEVEN

## 2023-11-09 NOTE — DISCHARGE INSTRUCTIONS
Follow up with Dr. Shilpi Ibanez tomorrow as arranged  Take keflex three times a day as prescribed  Return if any worsening symptoms or new concern

## 2023-11-09 NOTE — ED INITIAL ASSESSMENT (HPI)
Pt to ed with complaints of altered mental status since last night. Pt states her  told her she is not sleeping well and making odd statements.  Pt was seen in ed yesterday for arm numbness and had a full workup post myomectomy

## 2024-07-02 ENCOUNTER — HOSPITAL ENCOUNTER (EMERGENCY)
Age: 39
Discharge: HOME | End: 2024-07-02
Attending: EMERGENCY MEDICINE
Payer: COMMERCIAL

## 2024-07-02 VITALS
TEMPERATURE: 98 F | DIASTOLIC BLOOD PRESSURE: 63 MMHG | WEIGHT: 245 LBS | RESPIRATION RATE: 18 BRPM | SYSTOLIC BLOOD PRESSURE: 103 MMHG | BODY MASS INDEX: 39.37 KG/M2 | HEART RATE: 85 BPM | OXYGEN SATURATION: 99 % | HEIGHT: 66 IN

## 2024-07-02 DIAGNOSIS — G43.E09 CHRONIC MIGRAINE WITH AURA WITHOUT STATUS MIGRAINOSUS, NOT INTRACTABLE: Primary | ICD-10-CM

## 2024-07-02 LAB
ALBUMIN SERPL-MCNC: 3.1 G/DL (ref 3.4–5)
ALBUMIN/GLOB SERPL: 0.8 {RATIO} (ref 1–2)
ALP LIVER SERPL-CCNC: 82 U/L
ALT SERPL-CCNC: 24 U/L
ANION GAP SERPL CALC-SCNC: 4 MMOL/L (ref 0–18)
AST SERPL-CCNC: 10 U/L (ref 15–37)
B-HCG UR QL: NEGATIVE
BASOPHILS # BLD AUTO: 0.03 X10(3) UL (ref 0–0.2)
BASOPHILS NFR BLD AUTO: 0.4 %
BILIRUB SERPL-MCNC: 0.4 MG/DL (ref 0.1–2)
BUN BLD-MCNC: 12 MG/DL (ref 9–23)
CALCIUM BLD-MCNC: 8.8 MG/DL (ref 8.5–10.1)
CHLORIDE SERPL-SCNC: 107 MMOL/L (ref 98–112)
CO2 SERPL-SCNC: 26 MMOL/L (ref 21–32)
CREAT BLD-MCNC: 0.82 MG/DL
EGFRCR SERPLBLD CKD-EPI 2021: 93 ML/MIN/1.73M2 (ref 60–?)
EOSINOPHIL # BLD AUTO: 0.06 X10(3) UL (ref 0–0.7)
EOSINOPHIL NFR BLD AUTO: 0.7 %
ERYTHROCYTE [DISTWIDTH] IN BLOOD BY AUTOMATED COUNT: 17.1 %
GLOBULIN PLAS-MCNC: 4.1 G/DL (ref 2.8–4.4)
GLUCOSE BLD-MCNC: 114 MG/DL (ref 70–99)
HCT VFR BLD AUTO: 34.2 %
HGB BLD-MCNC: 11 G/DL
IMM GRANULOCYTES # BLD AUTO: 0.05 X10(3) UL (ref 0–1)
IMM GRANULOCYTES NFR BLD: 0.6 %
LYMPHOCYTES # BLD AUTO: 2.29 X10(3) UL (ref 1–4)
LYMPHOCYTES NFR BLD AUTO: 28 %
MCH RBC QN AUTO: 25.4 PG (ref 26–34)
MCHC RBC AUTO-ENTMCNC: 32.2 G/DL (ref 31–37)
MCV RBC AUTO: 79 FL
MONOCYTES # BLD AUTO: 0.67 X10(3) UL (ref 0.1–1)
MONOCYTES NFR BLD AUTO: 8.2 %
NEUTROPHILS # BLD AUTO: 5.07 X10 (3) UL (ref 1.5–7.7)
NEUTROPHILS # BLD AUTO: 5.07 X10(3) UL (ref 1.5–7.7)
NEUTROPHILS NFR BLD AUTO: 62.1 %
OSMOLALITY SERPL CALC.SUM OF ELEC: 285 MOSM/KG (ref 275–295)
PLATELET # BLD AUTO: 384 10(3)UL (ref 150–450)
POTASSIUM SERPL-SCNC: 4 MMOL/L (ref 3.5–5.1)
PROT SERPL-MCNC: 7.2 G/DL (ref 6.4–8.2)
RBC # BLD AUTO: 4.33 X10(6)UL
SODIUM SERPL-SCNC: 137 MMOL/L (ref 136–145)
TROPONIN I SERPL HS-MCNC: <3 NG/L
WBC # BLD AUTO: 8.2 X10(3) UL (ref 4–11)

## 2024-07-02 PROCEDURE — 85025 COMPLETE CBC W/AUTO DIFF WBC: CPT | Performed by: PHYSICIAN ASSISTANT

## 2024-07-02 PROCEDURE — 96361 HYDRATE IV INFUSION ADD-ON: CPT

## 2024-07-02 PROCEDURE — 81025 URINE PREGNANCY TEST: CPT

## 2024-07-02 PROCEDURE — 93010 ELECTROCARDIOGRAM REPORT: CPT

## 2024-07-02 PROCEDURE — 80053 COMPREHEN METABOLIC PANEL: CPT | Performed by: PHYSICIAN ASSISTANT

## 2024-07-02 PROCEDURE — 93005 ELECTROCARDIOGRAM TRACING: CPT

## 2024-07-02 PROCEDURE — 96375 TX/PRO/DX INJ NEW DRUG ADDON: CPT

## 2024-07-02 PROCEDURE — 99285 EMERGENCY DEPT VISIT HI MDM: CPT

## 2024-07-02 PROCEDURE — 96374 THER/PROPH/DIAG INJ IV PUSH: CPT

## 2024-07-02 PROCEDURE — 99284 EMERGENCY DEPT VISIT MOD MDM: CPT

## 2024-07-02 PROCEDURE — 84484 ASSAY OF TROPONIN QUANT: CPT | Performed by: PHYSICIAN ASSISTANT

## 2024-07-02 RX ORDER — DIPHENHYDRAMINE HYDROCHLORIDE 50 MG/ML
25 INJECTION INTRAMUSCULAR; INTRAVENOUS ONCE
Status: COMPLETED | OUTPATIENT
Start: 2024-07-02 | End: 2024-07-02

## 2024-07-02 RX ORDER — KETOROLAC TROMETHAMINE 30 MG/ML
30 INJECTION, SOLUTION INTRAMUSCULAR; INTRAVENOUS ONCE
Status: COMPLETED | OUTPATIENT
Start: 2024-07-02 | End: 2024-07-02

## 2024-07-02 RX ORDER — MORPHINE SULFATE 4 MG/ML
2 INJECTION, SOLUTION INTRAMUSCULAR; INTRAVENOUS ONCE
Status: COMPLETED | OUTPATIENT
Start: 2024-07-02 | End: 2024-07-02

## 2024-07-02 RX ORDER — METHYLPREDNISOLONE SODIUM SUCCINATE 125 MG/2ML
125 INJECTION, POWDER, LYOPHILIZED, FOR SOLUTION INTRAMUSCULAR; INTRAVENOUS ONCE
Status: COMPLETED | OUTPATIENT
Start: 2024-07-02 | End: 2024-07-02

## 2024-07-02 RX ORDER — BUTALBITAL, ACETAMINOPHEN AND CAFFEINE 50; 325; 40 MG/1; MG/1; MG/1
1-2 TABLET ORAL EVERY 6 HOURS PRN
Qty: 10 TABLET | Refills: 0 | Status: SHIPPED | OUTPATIENT
Start: 2024-07-02 | End: 2024-07-07

## 2024-07-02 RX ORDER — METOCLOPRAMIDE HYDROCHLORIDE 5 MG/ML
10 INJECTION INTRAMUSCULAR; INTRAVENOUS ONCE
Status: COMPLETED | OUTPATIENT
Start: 2024-07-02 | End: 2024-07-02

## 2024-07-02 NOTE — ED PROVIDER NOTES
Patient Seen in: [facility] In [place]      History     Chief Complaint   Patient presents with    Headache     Stated Complaint: headache 2 weeks    Subjective:   HPI    38-year-old female.  Medical history of Chiari malformation, anxiety with depression, migraine type headaches, reflux, obesity.  Patient arrives to the ER for evaluation of persistent headache.  Symptomatic, daily, for the last 3 weeks.  Underwent Botox injections prior to onset.  Has called her physician multiple times stating the pain has not improved or subsided.  Also recently had her monthly Ajovy injection.  Yesterday evening, discomfort was so significant that she took a leftover oxycodone from a previous surgery.  Even with narcotic, headache persisted.  She wonders if it is related to her recent illness.  She was placed on azithromycin for chest and sinusitis type symptoms 3 weeks prior to arrival.  She then followed up for a second visit was placed on steroid and amoxicillin.  She admits that she only took 5 days of the antibiotic.  Feels that she continues to have nasal congestion and pressure.  Denies any focal deficits.  No nausea, vomiting or diarrhea.  Afebrile.  No neck pain or stiffness.    Patient also complaining of a substernal chest pain.  She explains that she typically will develop chest pain whenever she is having a more moderate headache.  No recent travel.  No shortness of breath.    Objective:   Past Medical History:    Abdominal pain    Abnormal brain MRI    Anemia    Arthritis    Back pain    Back problem     back pain    Bloating    Blurred vision    Change in hair    Chest pain on exertion    Chiari malformation type I (HCC)    Constipation    Decorative tattoo    Depression    Dizziness    Easy bruising    Esophageal reflux    Fatigue    Feeling lonely    Flatulence/gas pain/belching    Food intolerance    Frequent urination    Frequent UTI    Gastroesophageal reflux disease without esophagitis     Headache disorder    Heart palpitations    Heartburn    Heavy menses    History of depression    Hoarseness, chronic    Indigestion    Menses painful    Migraines    Night sweats    Obstructive apnea    Edward PSG AHI 8.5    Pain in joints    Problems with swallowing    Rash    Shortness of breath    Skin blushing/flushing    Sleep apnea    Sleep apnea    Sleep disturbance    Stress    Visual impairment    glasses    Wears glasses    Weight gain    Wheezing              Past Surgical History:   Procedure Laterality Date    Colonoscopy      Colonoscopy N/A 1/15/2021    Procedure: COLONOSCOPY;  Surgeon: Usman Freed DO;  Location:  ENDOSCOPY    Other      wisdom teeth extraction    Upper gi endoscopy,exam                  Social History     Socioeconomic History    Marital status: Single   Tobacco Use    Smoking status: Never    Smokeless tobacco: Never   Vaping Use    Vaping status: Never Used   Substance and Sexual Activity    Alcohol use: Not Currently     Alcohol/week: 0.0 standard drinks of alcohol     Comment: socially    Drug use: Never    Sexual activity: Yes     Partners: Male   Other Topics Concern    Caffeine Concern No     Comment: coffee 1 cup per day    Exercise Yes     Comment: walking 30-60 min 2x per week    Seat Belt Yes     Social Determinants of Health     Financial Resource Strain: Low Risk  (10/25/2023)    Received from [email or web address], [facility]    Financial Resource Strain     In the past year, have you or any family members you live with been unable to get any of the following when it was really needed? Check all that apply.: None   Food Insecurity: No Food Insecurity (10/25/2023)    Received from [email or web address], [facility]    Food Insecurity     RETIRE How often in the past 12 months would you say you are worried or stressed about having enough money to buy nutritious meals? : Never   Transportation Needs: No Transportation Needs (10/25/2023)     [facility]    PRAPARE - Transportation     In the past 12 months, has lack of transportation kept you from medical appointments or from getting medications?: No     In the past 12 months, has lack of transportation kept you from meetings, work, or from getting things needed for daily living?: No   Social Connections: Socially Integrated (10/25/2023)    [facility], [facility]    Social Connections     How often do you see or talk to people that you care about and feel close to? (For example: talking to friends on the phone, visiting friends or family, going to [employer] or club meetings): 5 or more times a week              Review of Systems    Positive for stated Chief Complaint: Headache    Other systems are as noted in HPI.  Constitutional and vital signs reviewed.      All other systems reviewed and negative except as noted above.    Physical Exam     ED Triage Vitals   BP 07/02/24 1403 131/85   Pulse 07/02/24 1402 95   Resp 07/02/24 1402 18   Temp 07/02/24 1402 98 °F (36.7 °C)   Temp src --    SpO2 07/02/24 1402 98 %   O2 Device 07/02/24 1402 None (Room air)       Current Vitals:   Vital Signs  BP: 103/63  Pulse: 85  Resp: 18  Temp: 98 °F (36.7 °C)    Oxygen Therapy  SpO2: 99 %  O2 Device: None (Room air)            Physical Exam    Gen: Well appearing, well groomed, alert and aware x 3  Neck: Supple, full range of motion, no thyromegaly or lymphadenopathy.  Eye examination: EOMs are intact, normal conjunctival  ENT: No injection noted to the bilateral auditory canals; no loss of landmarks. Normal nasal mucosa without audible nasal congestion.  Oropharynx is patent without evidence of erythema, exudates or deviation.  No stridor to auscultation  Lung: No distress, RR, no retraction, breath sounds are clear bilaterally  Cardio: Regular rate and rhythm, normal S1-S2, no murmur appreciable  Skin: No sign  of trauma, Skin warm and dry, no induration or sign of infection.  No rash noted      ED Course     Labs Reviewed   COMP METABOLIC PANEL (14) - Abnormal; Notable for the following components:       Result Value    Glucose 114 (*)     AST 10 (*)     Albumin 3.1 (*)     A/G Ratio 0.8 (*)     All other components within normal limits   CBC W/ DIFFERENTIAL - Abnormal; Notable for the following components:    HGB 11.0 (*)     HCT 34.2 (*)     MCV 79.0 (*)     MCH 25.4 (*)     All other components within normal limits   TROPONIN I HIGH SENSITIVITY - Normal   POCT PREGNANCY URINE - Normal   CBC WITH DIFFERENTIAL WITH PLATELET    Narrative:     The following orders were created for panel order CBC With Differential With Platelet.  Procedure                               Abnormality         Status                     ---------                               -----------         ------                     CBC W/ DIFFERENTIAL[[id number]]          Abnormal            Final result                 Please view results for these tests on the individual orders.   RAINBOW DRAW LAVENDER   RAINBOW DRAW LIGHT GREEN     EKG    Rate, intervals and axes as noted on EKG Report.  Rate: 98  Rhythm: Sinus Rhythm  Reading: Normal sinus rhythm.  No evidence of acute ischemic changes                          MDM          My supervising physician was involved in the management of this patient.    Well-documented history of headaches.  Known Chiari malformation.  Recent Botox injections.  Monthly and Ajovy.  Persistent episodes these past few weeks.  Outpatient MRI scheduled for later this month.    Possibility of persistent underlying sinusitis    Currently afebrile.  No tachycardia.  98% room air.  Benign neuroexam    IV established.  Nonnarcotic migraine cocktail administered.  CBC, CMP and troponin.  EKG      CMP demonstrates glucose 114.  Albumin 3.1    CBC demonstrates a hemoglobin of 11.0.  No leukocytosis    Troponin negative    Patient  reevaluated.  Sleeping in room.  Upon waking, continues to describe her migraine is moderate.  Further medications were administered    Will plan on a prescription for Fioricet at home and patient encouraged to follow-up with her current specialist                         Medical Decision Making      Disposition and Plan     Clinical Impression:  1. Chronic migraine with aura without status migrainosus, not intractable         Disposition:  Discharge  7/2/2024  4:05 pm    Follow-up:  Lyndsay Godfrey MD  [address] [facility] [address]  [facility] [address]  [phone]    Follow up            Medications Prescribed:  Current Discharge Medication List        START taking these medications    Details   butalbital-acetaminophen-caffeine -40 MG Oral Tab Take 1-2 tablets by mouth every 6 (six) hours as needed for Pain.  Qty: 10 tablet, Refills: 0    Associated Diagnoses: Chronic migraine with aura without status migrainosus, not intractable

## 2024-07-02 NOTE — DISCHARGE INSTRUCTIONS
Attempt new medication.  I recommend taking the Fioricet with a 25 mg dosage of Benadryl and a few large glasses of clear fluids.  Please follow-up with your current specialist.

## 2024-07-03 LAB
ATRIAL RATE: 98 BPM
P AXIS: 53 DEGREES
P-R INTERVAL: 142 MS
Q-T INTERVAL: 342 MS
QRS DURATION: 80 MS
QTC CALCULATION (BEZET): 436 MS
R AXIS: -29 DEGREES
T AXIS: 7 DEGREES
VENTRICULAR RATE: 98 BPM

## 2025-04-01 NOTE — TELEPHONE ENCOUNTER
Can try amitriptyline 10mg once a day.  Please send her info about potential SE such as mood change, headaches, sleep problems etc. Please see the attached refill request.

## (undated) DIAGNOSIS — G43.009 MIGRAINE WITHOUT AURA AND WITHOUT STATUS MIGRAINOSUS, NOT INTRACTABLE: ICD-10-CM

## (undated) DEVICE — Device: Brand: DEFENDO AIR/WATER/SUCTION AND BIOPSY VALVE

## (undated) DEVICE — TIP ELECTROCAUTERY HOT SHR DA VINCI EWRST 8MM STD CAUT MNPLR

## (undated) DEVICE — SYRINGE 30ML CONC TIP GRAD N-PYRG DEHP-FR STRL MED LF DISP

## (undated) DEVICE — SYRINGE 5ML GRAD N-PYRG DEHP-FR PVC FREE STRL MED LF DISP LL

## (undated) DEVICE — NEEDLE HPO 18GA 1.5IN REG WALL REG BVL LL HUB CLR CD DEHP-FR

## (undated) DEVICE — 2% CHLORHEXIDINE SKIN PREP ORANGE 26ML

## (undated) DEVICE — FILTERLINE NASAL ADULT O2/CO2

## (undated) DEVICE — SUTURE 2-0 XLH 27IN PLN MONO ABS YELLOWISH TAN 53T

## (undated) DEVICE — HEMOSTAT ABS 4X4IN NONWOVEN SURGICEL SNOW

## (undated) DEVICE — NEEDLE INSFL 120MM EPTH PNMPRTN

## (undated) DEVICE — ELECTRODE ESURG BLADE PNCL 10FT TLSCP SMOKE EVAC RCKR SWH

## (undated) DEVICE — DRAPE CLMN EQUIPMENT DA VINCI XI

## (undated) DEVICE — SUTURE VICRYL 0 CT1 36IN BRAID COAT ABS VIOL J346H

## (undated) DEVICE — ELECTRODE ESURG 10FT 2 DSPR ADH BRDR PULL TAB PREATTACH CBL

## (undated) DEVICE — Device

## (undated) DEVICE — SUTURE COAT VICRYL 0 CT1 27IN BRAID ABS VIOL J340H

## (undated) DEVICE — 3M™ RED DOT™ MONITORING ELECTRODE WITH FOAM TAPE AND STICKY GEL, 50/BAG, 20/CASE, 72/PLT 2570: Brand: RED DOT™

## (undated) DEVICE — SET IRR .188IN 82IN 4.5IN CONTINUOUS OVRHD DIST FLXB CNCT

## (undated) DEVICE — ELECTRODE PT RTN C30- LB 15FT CORD ADH STRIP VALLEYLAB REM

## (undated) DEVICE — 1200CC GUARDIAN II: Brand: GUARDIAN

## (undated) DEVICE — POSITIONER OR RSPBRY 8.5X8X4IN HEAD FOAM HI RSLNT SLOT LF

## (undated) DEVICE — TRAY CATH SURESTEP LUBRI-SIL STLK 16FR 350ML FOLEY URMTR

## (undated) DEVICE — GLOVE SURG 7.5 PROTEXIS LF CRM PF SMTH BEAD CUFF STRL

## (undated) DEVICE — OBTURATOR LAPSCP 8MM WECK VISTA DISP BLDLS STRL LF

## (undated) DEVICE — SPONGE LAPAROTOMY 18X18IN STERILE 5 PK

## (undated) DEVICE — GLOVE SURG 6 PREMIERPRO LF GRN PF TXTR STRL PLISPRN DISP MIC

## (undated) DEVICE — SPONGE GAUZE 4X4IN CTN 12 PLY WOVEN FOLD EDGE TYPE 7 XRAY

## (undated) DEVICE — KIT RM TURNOVER CSTM IC DISP NS LF

## (undated) DEVICE — PORT HAND ACC 120MM BLDLS OPTC TIP LOWPRFL OBT AIRSEAL 8MM

## (undated) DEVICE — SUTURE PDS2 0 CTX 60IN MONO LOOP ABS VIOL

## (undated) DEVICE — ENDOSCOPY PACK UPPER: Brand: MEDLINE INDUSTRIES, INC.

## (undated) DEVICE — NEEDLE HPO 16GA 1.5IN REG WALL REG BVL LL HUB DEHP-FR STRL

## (undated) DEVICE — BULB 100CC SIL DRN LTWT LOW LVL SCT WND DRN STRL LF DISP

## (undated) DEVICE — STAPLER SKIN 3.9X6.9MM WIDE RECT 35 CNT ROTATE HEAD RCHT

## (undated) DEVICE — MEDI-VAC NON-CONDUCTIVE SUCTION TUBING: Brand: CARDINAL HEALTH

## (undated) DEVICE — SUTURE VICRYL 2-0 CT1 27IN BRAID COAT ABS UNDYED J259H

## (undated) DEVICE — DRESSING TRANS 4.75X4IN ADH HPOAL WTPRF TEGADERM PU STD STRL

## (undated) DEVICE — ADHESIVE LIQUID LF WTPRF VIAL PREP NONSTAIN MASTISOL STYRAX

## (undated) DEVICE — GLOVE SURG 6 PREMIERPRO LF NATURAL PF TXTR SMTH STRL

## (undated) DEVICE — DRAPE .75 SHT FNFLD 76X52IN SURG CNVRT STRL LF DISP TIBURON

## (undated) DEVICE — FORCEP BIOPSY RJ4 LG CAP W/ND

## (undated) DEVICE — SPONGE SURG 4X4IN CTN 16 PLY XRY DTCT STRL LF DISP

## (undated) DEVICE — DRESSING ADH 8X4IN ABS NADH FILM ISLAND NONWOVEN KENDALL

## (undated) DEVICE — DECANTER FLUID DSPNSR BAG BAJ DISP STRL LF ASEPTIC TRNSF

## (undated) DEVICE — SUTURE VICRYL 3-0 KS 27IN BRAID COAT ABS UNDYED J663H

## (undated) DEVICE — GLOVE SURG 7.5 PROTEXIS LF BLUE PF SMTH BEAD CUFF INTLK STRL

## (undated) DEVICE — STRIP 4X.5IN STRSTRP POLY REINFORCE SKNCLS WHT STRL LF

## (undated) DEVICE — PENCIL SMKEVC COAT PSHBTN LF

## (undated) DEVICE — GOWN SURG LG L3 NONREINFORCE SET IN SLV STRL LF DISP BLUE

## (undated) DEVICE — ENDOSCOPY PACK - LOWER: Brand: MEDLINE INDUSTRIES, INC.

## (undated) DEVICE — GLOVE SURG 6.5 PREMIERPRO LF GRN PF TXTR STRL PLISPRN DISP

## (undated) DEVICE — TUBING SCT CLR 12FT 316IN MDVC MAXI-GRIP NCDTV MALE TO MALE

## (undated) DEVICE — SEAL CANNULA 5-12MM DISP

## (undated) DEVICE — SYRINGE 18GA 1.5IN 5ML BLUNT FILL NDL CONC TIP LL HUB

## (undated) DEVICE — DEVICE REM MYOSURE REACH HYSCP PLP FIBROIDS

## (undated) DEVICE — SET TBG 3 LUM FLTR AIRSEAL STRL LF ACT CHRCL DISP

## (undated) DEVICE — PAD ABD 9X5IN CELLULOSE ABS NONWOVEN HDRPHB BACK SEAL EDGE

## (undated) DEVICE — HANDPIECE SCT MDVC YNKR BLBS TIP CLR STRL LF DISP

## (undated) DEVICE — BLADE SURG 11 STRL SS

## (undated) DEVICE — ADHESIVE PREMIERPRO EXOFIN 1ML HVISC TUBE SOFT FLXB APL

## (undated) DEVICE — KIT FLUID MGMT BAG TISS TRAP FLUENT FLOPAK DISP

## (undated) DEVICE — GLOVE SURG 6.5 PROTEXIS LTX YLW PF SMTH BEAD CUFF STRL

## (undated) DEVICE — STAPLER SKIN 3.9X6.9MM WIDE 35 CNT FX HEAD RCHT STRL LF

## (undated) DEVICE — SYRINGE 50ML GRAD N-PYRG DEHP-FR PVC FREE STRL MED LF DISP

## (undated) DEVICE — GOWN SURG XL L3 NONREINFORCE SET IN SLV STRL LF DISP BLUE

## (undated) DEVICE — SUTURE VICRYL 0 CT1 18IN CNTRL RELS BRAID 8 STRN COAT ABS J740D

## (undated) DEVICE — MEDI-VAC SUCTION HANDLE REGULAR CAPACITY: Brand: CARDINAL HEALTH

## (undated) DEVICE — DRAPE ARM 21X19X10.5IN EQUIPMENT DA VINCI XI 21LB

## (undated) DEVICE — NEEDLE HPO 25GA 1.5IN REG WALL REG BVL LL HUB DEHP-FR STRL

## (undated) DEVICE — PAD DRSG 8X3IN CRD POLY CTN ABS PERFORATE FILM LF STRL

## (undated) DEVICE — SUTURE MONOCRYL MTPS 4-0 PS2 18IN MONO ABS UNDYED

## (undated) DEVICE — COVER STAND 23IN MAYO CNVRT PLASTIC REINFORCE STRL LF DISP

## (undated) DEVICE — SUTURE COAT VICRYL 0 18IN BRAID TIES 3 STRN ABS VIOL J636H

## (undated) DEVICE — SYSTEM IMG CLEARIFY 8X6IN WARM HUB TROCAR WIPE MRFBR DISP

## (undated) DEVICE — SUTURE MONOCRYL MTPS 3-0 PS2 18IN MONO ABS UNDYED

## (undated) DEVICE — GLOVE SURG 7 PROTEXIS LF BLUE PF SMTH BEAD CUFF INTLK STRL

## (undated) DEVICE — MANIPULATOR UT 34MM VCARE + MED CUP STRL LF DISP

## (undated) DEVICE — HEMOSTAT ABS BIONERT ARISTA AH MPH PLANT STRH 5GM

## (undated) DEVICE — DRAPE ORS FLUID WRM SYS 66X44IN WRMR ORS STRL DISP PU

## (undated) DEVICE — NEEDLE HPO 25GA 1IN REG WALL REG BVL LL SHLD MECH DEHP-FR

## (undated) DEVICE — GLOVE SURG 6.5 PROTEXIS LF CRM PF BEAD CUFF STRL PLISPRN

## (undated) DEVICE — IRRIGATOR SCT STRKFL 2 STRL LF DISP

## (undated) NOTE — LETTER
Patient Name: Law Jeff  YOB: 1985          MRN number:  [id number]  Date:  7/28/2021  Referring Physician:  Luiza Rey         SPINE EVALUATION:    Referring Physician: Dr. Angela Yang  Diagnosis: Lumbar Instability     Date of Service: 7/ tone, and increased pain with PA spring across the SPs of the lumbar spine  Darius Ravenel would benefit from skilled Physical Therapy to address the above impairments to increase stability of the spine and improve overall functionality.       Precautions:  None  O factors/comorbidities. PLAN OF CARE:    Goals (12 visits):    1. Increase FOTO assessment > 11% from INE to DC. 2. Patient will be aware of postural limitations and be able to correct them independently.     3. Patient will have an increase in spinal mo

## (undated) NOTE — MR AVS SNAPSHOT
After Visit Summary   3/23/2021    Yonny Houston    MRN: [id number]           Visit Information     Date & Time  3/23/2021  3:45 PM Provider  Jenni Dotson MD [place] [address] in [place] [address].  Phone  [phone]      Y Following     Normal Orders This Visit    CBC W/ DIFFERENTIAL [[id number] CUSTOM]     CBC WITH DIFFERENTIAL WITH PLATELET [[id number] CUSTOM]     COMP METABOLIC PANEL (14) [[id number] CUSTOM]     FERRITIN [[id number] CUSTOM]     IRON AND TIBC [[id number] CUSTOM]     [facility] Primary Care Providers  Treatment for mild illness or injury that does not require immediate attention.  Average cost  $70*   [facility]  Monday – Friday  8:00 am – 8:00 pm   Saturday – Sunday  8:00 am – 4:00 pm    [place]

## (undated) NOTE — LETTER
Date & Time: 7/2/2024, 4:19 PM  Patient: Sparkle García  Encounter Provider(s):    Ravi Andrade MD Johnston, [place], PA-C       To Whom It May Concern:    Sparkle García was seen and treated in our department on 7/2/2024. She should not return to work until 07/03/2024 .    If you have any questions or concerns, please do not hesitate to call.        _____________________________  Physician/APC Signature

## (undated) NOTE — LETTER
Date: 6/1/2021    Patient Name: Chris Watson          To Whom it may concern: The above patient was seen at the [facility] for treatment of a medical condition.     Given medical condition, please provide patient with dictation software and

## (undated) NOTE — ED AVS SNAPSHOT
[facility] in [address] [facility]    Phone:  [phone]    Fax:  [phone]           Beatrice Ortiz   MRN: [id number]    [facility] [address] [place] [address]   Date of Visit:  6 IF THERE IS ANY CHANGE OR WORSENING OF YOUR CONDITION, CALL YOUR PRIMARY CARE PHYSICIAN AT ONCE OR RETURN IMMEDIATELY TO THE EMERGENCY DEPARTMENT.     If you have been prescribed any medication(s), please fill your prescription right away and begin taking t

## (undated) NOTE — ED AVS SNAPSHOT
Maribel Paul   MRN: [id number]    Department:  [facility] in [place]   Date of Visit:  1/29/2020           Disclosure     Insurance plans vary and the physician(s) referred by the ER may not be covered by your plan.  Please contact y tell this physician (or your personal doctor if your instructions are to return to your personal doctor) about any new or lasting problems. The primary care or specialist physician will see patients referred from the [facility] Emergency Department.  Anita Horowitz

## (undated) NOTE — LETTER
6/1/2021 June 1, 2021      Referring:  Donte Amin 49 [address] [facility],[address]      Dear Dr.'s Gisele Collins and [facility]:    Thank you for referring your patient to me for an evaluation.  Please see my attached note for m Started on cymbalta for polyarthralgia, but had serioues side effects (was getting nauseated and was crying a lot). Taking depatoke for headaches. This helps.    And was elavil 10 mg at bedtime, but neurologist recommended not to take this because of w Procedure Laterality Date   • COLONOSCOPY     • COLONOSCOPY N/A 1/15/2021    Procedure: COLONOSCOPY;  Surgeon: Petey Keith DO;  Location: [facility] ENDOSCOPY   • OTHER      wisdom teeth extraction   • UPPER GI ENDOSCOPY,EXAM       Family History:  Family Hi RBC 4.35 03/23/2021    HGB 12.5 03/23/2021    HCT 39.2 03/23/2021    .0 03/23/2021    MCV 90.1 03/23/2021    MCH 28.7 03/23/2021    MCHC 31.9 03/23/2021    RDW 12.7 03/23/2021    NEPRELIM 5.44 03/23/2021    NEUTABS 6,124 10/03/2020    LYMPHABS 2,175 bulging disc without significant thecal sac effacement   C4-C5: Diffuse broad based disc bulge with mild ventral thecal sac effacement   C5-C6: Mild bulging disc without significant thecal sac effacement   C6-C7: No significant disc/facet abnormality, spin syndrome, cervical DJD, and chronic low back pain. Will trial tizanidine 2 to 4 mg nightly. Patient did not tolerate Cymbalta previously. Hold off on physical therapy until patient gets second opinion from neurosurgery.     No evidence of central sens

## (undated) NOTE — ED AVS SNAPSHOT
[facility] in [facility] [address] [facility]    Phone:  [phone]    Fax:  [phone]           Beatrice Ortiz   MRN: [id number]    Department:  [facility] in [place]   Date of Visit:  6 covered by your plan. Please contact your insurance company to determine coverage for follow-up care and referrals.     [address] [employer] [place] [phone]  Pediatric [id number] Emergency Department   [phone]       To by a radiologist.  If there is a significant change in your reading, you will be contacted. Please make sure we have your correct phone number before you leave. After you leave, you should follow the attached instructions.      I have read and understand th [facility]. MyChart     Sign up for [employer], your secure online medical record. [employer] will allow you to access patient instructions from your recent visit,  view other health information, and more.  To sign up or find more information,